# Patient Record
Sex: FEMALE | Race: WHITE | NOT HISPANIC OR LATINO | Employment: UNEMPLOYED | URBAN - METROPOLITAN AREA
[De-identification: names, ages, dates, MRNs, and addresses within clinical notes are randomized per-mention and may not be internally consistent; named-entity substitution may affect disease eponyms.]

---

## 2017-01-09 RX ORDER — LISINOPRIL 40 MG/1
TABLET ORAL
Qty: 90 TABLET | Refills: 0 | Status: SHIPPED | OUTPATIENT
Start: 2017-01-09 | End: 2017-04-06 | Stop reason: SDUPTHER

## 2017-01-17 ENCOUNTER — OFFICE VISIT (OUTPATIENT)
Dept: INTERNAL MEDICINE | Facility: CLINIC | Age: 57
End: 2017-01-17
Payer: COMMERCIAL

## 2017-01-17 ENCOUNTER — HOSPITAL ENCOUNTER (OUTPATIENT)
Dept: RADIOLOGY | Facility: HOSPITAL | Age: 57
Discharge: HOME | End: 2017-01-17
Attending: INTERNAL MEDICINE
Payer: COMMERCIAL

## 2017-01-17 VITALS
BODY MASS INDEX: 39.37 KG/M2 | HEIGHT: 64 IN | TEMPERATURE: 98 F | HEART RATE: 84 BPM | DIASTOLIC BLOOD PRESSURE: 80 MMHG | WEIGHT: 230.63 LBS | SYSTOLIC BLOOD PRESSURE: 130 MMHG

## 2017-01-17 DIAGNOSIS — M79.644 THUMB PAIN, RIGHT: ICD-10-CM

## 2017-01-17 DIAGNOSIS — I10 HTN (HYPERTENSION), BENIGN: ICD-10-CM

## 2017-01-17 DIAGNOSIS — E78.5 DYSLIPIDEMIA: ICD-10-CM

## 2017-01-17 DIAGNOSIS — E11.9 DIABETES MELLITUS WITHOUT COMPLICATION: Primary | ICD-10-CM

## 2017-01-17 PROCEDURE — 4010F ACE/ARB THERAPY RXD/TAKEN: CPT | Mod: S$GLB,,, | Performed by: INTERNAL MEDICINE

## 2017-01-17 PROCEDURE — 3075F SYST BP GE 130 - 139MM HG: CPT | Mod: S$GLB,,, | Performed by: INTERNAL MEDICINE

## 2017-01-17 PROCEDURE — 73140 X-RAY EXAM OF FINGER(S): CPT | Mod: TC,PO

## 2017-01-17 PROCEDURE — 73140 X-RAY EXAM OF FINGER(S): CPT | Mod: 26,,, | Performed by: RADIOLOGY

## 2017-01-17 PROCEDURE — 99999 PR PBB SHADOW E&M-EST. PATIENT-LVL III: CPT | Mod: PBBFAC,,, | Performed by: INTERNAL MEDICINE

## 2017-01-17 PROCEDURE — 99214 OFFICE O/P EST MOD 30 MIN: CPT | Mod: S$GLB,,, | Performed by: INTERNAL MEDICINE

## 2017-01-17 PROCEDURE — 1159F MED LIST DOCD IN RCRD: CPT | Mod: S$GLB,,, | Performed by: INTERNAL MEDICINE

## 2017-01-17 PROCEDURE — 2022F DILAT RTA XM EVC RTNOPTHY: CPT | Mod: S$GLB,,, | Performed by: INTERNAL MEDICINE

## 2017-01-17 PROCEDURE — 3045F PR MOST RECENT HEMOGLOBIN A1C LEVEL 7.0-9.0%: CPT | Mod: S$GLB,,, | Performed by: INTERNAL MEDICINE

## 2017-01-17 PROCEDURE — 3079F DIAST BP 80-89 MM HG: CPT | Mod: S$GLB,,, | Performed by: INTERNAL MEDICINE

## 2017-01-17 NOTE — PROGRESS NOTES
This office note has been dictated.  HISTORY OF PRESENT ILLNESS:  This is a 56-year-old lady following up on several   chronic medical conditions including diabetes mellitus, hypertension,   dyslipidemia and obesity.  The patient reports she is taking all of her   medication as directed.  Lifestyle has not been optimal in recent months with   regards to diet and activity levels.  She is not currently having any chest   pain, palpitations, syncope, cough or shortness of breath.    CURRENT MEDICATIONS:  Noted and reviewed in our electronic medical record   medication list.    REVIEW OF SYSTEMS:  CONSTITUTIONAL:  No fever.  No chills.  CARDIOVASCULAR:  No chest pain, palpitations or syncope.  No exertional limb   pain.  RESPIRATORY:  No cough or shortness of breath.  MUSCULOSKELETAL:  She gives 1-month history of some pain over the right thumb at   the metacarpophalangeal joint, palmar aspect.  No history of trauma.    PAST MEDICAL HISTORY, PAST SURGICAL HISTORY, FAMILY AND SOCIAL HISTORY:  Noted   and reviewed in the electronic medical record history sections.    PHYSICAL EXAMINATION:  GENERAL:  Alert, pleasant, appropriately groomed lady, in no acute distress.  VITAL SIGNS:  Blood pressure taken manually by this examiner is 130/80.  EYES:  Sclerae are white and nonicteric.  HEENT:  Normocephalic.  NECK:  Supple.  No masses.  No thyromegaly.  LUNGS:  Clear to auscultation.  CARDIOVASCULAR:  Regular rate and rhythm.  No murmur, click, rub or gallop.    Carotids are full bilaterally without bruits.  No significant peripheral   extremity edema.  ORTHOPEDIC:  Right Hand:  Thumb, there is tenderness to palpation over the   palmar aspect of the right metacarpophalangeal joint, first digit.  No swelling.    No fluctuance.    LABORATORY DATA:  We reviewed recent laboratory data from 12/27/2016.    Glycohemoglobin is 7.8.  TSH is minimally elevated.  Chemistries and lipids   noted.    IMPRESSION:  1.  Obesity with  comorbidities of hypertension, diabetes and dyslipidemia.  2.  Type 2 diabetes mellitus, is not optimally controlled.  3.  Hypertension, controlled.  4.  Dyslipidemia, on statin therapy.  5.  Pain over the first thumb digit joint as noted above.    PLAN:  1.  I discussed need for optimization of lifestyle, increased activity, improved   diet and weight loss, particularly in view of her BMI of 39.58.  After much   discussion, she is agreeable to start Januvia 100 mg daily.  She is given a free   30-month trial as well as discount cards to use.  2.  In 3 months, check complete metabolic profile, TSH, free T4 and   glycohemoglobin.  3.  I advised pneumococcal-23 Valent vaccine, which she declines.  I also   advised an influenza vaccine and she declines.  4.  Return to clinic in 6 months.      JON  dd: 01/17/2017 18:07:45 (CST)  td: 01/18/2017 05:19:09 (CST)  Doc ID   #[id number]  Job ID #[id number]    CC:

## 2017-01-19 RX ORDER — BLOOD-GLUCOSE METER
EACH MISCELLANEOUS
Qty: 100 STRIP | Refills: 11 | Status: SHIPPED | OUTPATIENT
Start: 2017-01-19 | End: 2018-09-20 | Stop reason: SDUPTHER

## 2017-01-23 ENCOUNTER — TELEPHONE (OUTPATIENT)
Dept: INTERNAL MEDICINE | Facility: CLINIC | Age: 57
End: 2017-01-23

## 2017-01-23 RX ORDER — ALPRAZOLAM 0.5 MG/1
TABLET ORAL
Qty: 100 TABLET | Refills: 0 | Status: SHIPPED | OUTPATIENT
Start: 2017-01-23 | End: 2017-02-23 | Stop reason: SDUPTHER

## 2017-01-23 NOTE — TELEPHONE ENCOUNTER
----- Message from Carmina Arredondo sent at 1/23/2017  1:14 PM CST -----  Contact: Call Pt [phone]  Pt called requesting to speak to you concerning denial of medication Alprazolam

## 2017-01-24 ENCOUNTER — OFFICE VISIT (OUTPATIENT)
Dept: ORTHOPEDICS | Facility: CLINIC | Age: 57
End: 2017-01-24
Payer: COMMERCIAL

## 2017-01-24 VITALS
HEART RATE: 100 BPM | WEIGHT: 230 LBS | DIASTOLIC BLOOD PRESSURE: 85 MMHG | HEIGHT: 64 IN | SYSTOLIC BLOOD PRESSURE: 139 MMHG | BODY MASS INDEX: 39.27 KG/M2

## 2017-01-24 DIAGNOSIS — M79.644 THUMB PAIN, RIGHT: ICD-10-CM

## 2017-01-24 DIAGNOSIS — M65.311 TRIGGER FINGER OF RIGHT THUMB: Primary | ICD-10-CM

## 2017-01-24 PROCEDURE — 3079F DIAST BP 80-89 MM HG: CPT | Mod: S$GLB,,, | Performed by: ORTHOPAEDIC SURGERY

## 2017-01-24 PROCEDURE — 20550 NJX 1 TENDON SHEATH/LIGAMENT: CPT | Mod: F5,S$GLB,, | Performed by: ORTHOPAEDIC SURGERY

## 2017-01-24 PROCEDURE — 1159F MED LIST DOCD IN RCRD: CPT | Mod: S$GLB,,, | Performed by: ORTHOPAEDIC SURGERY

## 2017-01-24 PROCEDURE — 99204 OFFICE O/P NEW MOD 45 MIN: CPT | Mod: 25,S$GLB,, | Performed by: ORTHOPAEDIC SURGERY

## 2017-01-24 PROCEDURE — 99999 PR PBB SHADOW E&M-EST. PATIENT-LVL III: CPT | Mod: PBBFAC,,, | Performed by: ORTHOPAEDIC SURGERY

## 2017-01-24 PROCEDURE — 3075F SYST BP GE 130 - 139MM HG: CPT | Mod: S$GLB,,, | Performed by: ORTHOPAEDIC SURGERY

## 2017-01-24 RX ORDER — MELOXICAM 15 MG/1
15 TABLET ORAL DAILY
Qty: 30 TABLET | Refills: 0 | Status: SHIPPED | OUTPATIENT
Start: 2017-01-24 | End: 2017-02-20 | Stop reason: SDUPTHER

## 2017-01-24 RX ORDER — TRIAMCINOLONE ACETONIDE 40 MG/ML
40 INJECTION, SUSPENSION INTRA-ARTICULAR; INTRAMUSCULAR
Status: DISCONTINUED | OUTPATIENT
Start: 2017-01-24 | End: 2017-01-24 | Stop reason: HOSPADM

## 2017-01-24 RX ADMIN — TRIAMCINOLONE ACETONIDE 40 MG: 40 INJECTION, SUSPENSION INTRA-ARTICULAR; INTRAMUSCULAR at 05:01

## 2017-01-24 NOTE — PROGRESS NOTES
Subjective:          Chief Complaint: Carmen Hernandez is a 56 y.o. female who had concerns including Pain of the Right Hand.    HPI Comments: Mrs. Hernandez is here for evaluation of her right thumb that has been triggering for the past 2 weeks. She has not had this happen in the past.    Pain: 0-10/10      Review of Systems   Constitution: Negative for chills and fever.   Endocrine:        Diabetes   Hematologic/Lymphatic:        Anemia   Musculoskeletal: Positive for joint pain.   Psychiatric/Behavioral: The patient is nervous/anxious.    All other systems reviewed and are negative.                  Objective:        General: Carmen is well-developed, well-nourished, appears stated age, in no acute distress, alert and oriented to time, place and person.     General    Vitals reviewed.  Constitutional: She is oriented to person, place, and time. She appears well-developed and well-nourished. No distress.   HENT:   Head: Normocephalic and atraumatic.   Nose: Nose normal.   Eyes: Pupils are equal, round, and reactive to light.   Pulmonary/Chest: Effort normal.   Neurological: She is alert and oriented to person, place, and time.   Psychiatric: She has a normal mood and affect. Her behavior is normal. Judgment and thought content normal.             Right Hand/Wrist Exam     Inspection   Scars: Wrist - absent Hand -  absent  Effusion: Wrist - absent Hand -  absent  Bruising: Wrist - absent Hand -  absent  Deformity: Wrist - deformity Hand -  deformity    Pain   Hand - The patient exhibits pain of the thumb MCP.    Swelling   Hand - The patient is swollen on the thumb MCP.    Tenderness   The patient is tender to palpation of the patel area.    Range of Motion     Wrist   Extension: normal   Flexion: normal   Pronation: normal   Supination: normal     Tests     Atrophy   Thenar:  negative  Hypothenar:  negative  Intrinsic:  negative  1st Dorsal Interosseous: negative    Other     Neuorologic Exam    Median  Distribution: normal  Ulnar Distribution: normal  Radial Distribution: normal    Comments:  Palpable click and active triggering on exam today of right thumb      Left Hand/Wrist Exam   Left hand exam is normal.          Muscle Strength   Right Upper Extremity   Wrist Extension: 5/5/5   Wrist Flexion: 5/5/5   : 5/5/5   Index Finger: 5/5  Middle Finger: 5/5  Ring Finger: 5/5  Little Finger: 5/5  Thumb - APB: 5/5  Thumb - FPL: 5/5  Pinch Mechanism: 5/5    Vascular Exam       Capillary Refill  Right Hand: normal capillary refill    Current and previous radiographic studies and results were reviewed with the patient:   3 views right.  Bone, joint and soft tissues normal        Assessment:       Encounter Diagnoses   Name Primary?    Thumb pain, right     Trigger finger of right thumb Yes          Plan:           Right thumb injection  Meloxicam 15mg pO QD  F/U PRN

## 2017-01-24 NOTE — PATIENT INSTRUCTIONS
What is Trigger Finger?  Trigger finger is an inflammation of tissue inside your finger or thumb. It is also called tenosynovitis (ten-oh-sin-oh-VY-tis). Tendons (cordlike fibers that attach muscle to bone and allow you to bend the joints) become swollen. So does the synovium (a slick membrane that allows the tendons to move easily). This makes it difficult to straighten the finger or thumb.    Causes  Repeated use of a tool with strong gripping, such as a drill or wrench, can irritate and inflame the tendons and the synovium. It is also more common in certain medical conditions such as rheumatoid arthritis, gout, and diabetes. But often the cause of trigger finger is unknown.  Inside your finger  Tendons connect muscles in your forearm to the bones in your fingers. The tendons in each finger are surrounded by a protective tendon sheath. This sheath is lined with synovium, which produces a fluid that allows the tendons to slide easily when you bend and straighten the finger. If a tendon is irritated, it becomes inflamed.  When a tendon is inflamed  When a tendon is inflamed, it causes the lining of the tendon sheath to swell and thicken. Or the tendon itself may thicken. Then the sheath pinches the tendon, and the tendon can no longer slide easily inside the sheath. When you straighten your finger, the tendon sticks or locks as it tries to squeeze back through the sheath.    Symptoms  The first sign of trigger finger may be pain where the finger or thumb joins the palm. You may also notice some swelling. As the tendon becomes more inflamed, the finger may start to catch when you try to straighten it. When the locked tendon releases, the finger jumps, as if you were releasing the trigger of a gun. This further irritates the tendon, and may set up a cycle of catching and swelling.   © 9747-8884 [employer] [address] [place] [address], [place] [address]. All rights reserved. This information is not  intended as a substitute for professional medical care. Always follow your healthcare professional's instructions.        Treating Trigger Finger     The tendon sheath is opened to release the tendon. Once the tendon can move freely again, the finger can bend and straighten more normally.     Trigger finger occurs when the tissue inside your finger or thumb becomes inflamed. Mild cases can be treated without surgery. If the problem is severe, surgery may be needed. Your doctor will discuss your options with you.  Nonsurgical treatment  For mild symptoms, your doctor may have you rest the finger or thumb. You may also be told to take anti-inflammatory medicines. These include ibuprofen or aspirin. You may be given an injection of medicine in the base of the finger or thumb. This typically is a steroid, such as cortisone.  Surgery  If nonsurgical treatments dont ease your symptoms, surgery may be recommended. A tendon is a cordlike fiber that attaches muscle to bone and allows joints to bend. The tendon is surrounded by a protective cover called a sheath. During surgery, the sheath in your finger or thumb is opened to enlarge the space and release the swollen tendon. This allows the finger or thumb to bend and straighten normally. Surgery takes about 20 minutes. It can often be done using a local anesthetic. You may be able to go home the same day. Your hand will be wrapped in a soft bandage. You may need to wear a plaster splint for a short time to keep the finger or thumb still as it heals. The stitches will be removed in about 2 weeks. Your doctor can discuss the risks and benefits of surgery with you.  © 3342-3177 [employer]. [address] [place] [address], [place] [address]. All rights reserved. This information is not intended as a substitute for professional medical care. Always follow your healthcare professional's instructions.

## 2017-01-24 NOTE — PROCEDURES
Tendon Sheath  Date/Time: 1/24/2017 5:01 PM  Performed by: CLARITA LAKE  Authorized by: CLARITA LAKE     Consent Done?: Yes (Verbal)  Timeout: prior to procedure the correct patient, procedure, and site was verified   Indications:  Pain  Site marked: the procedure site was marked    Timeout: prior to procedure the correct patient, procedure, and site was verified    Location:  Thumb  Site:  R thumb MCP  Prep: patient was prepped and draped in usual sterile fashion    Ultrasonic guidance for needle placement?: No    Needle size:  25 G  Approach:  Volar  Medications:  40 mg triamcinolone acetonide 40 mg/mL  Patient tolerance:  Patient tolerated the procedure well with no immediate complications

## 2017-01-24 NOTE — LETTER
January 24, 2017      SHAGUFTA Bentley MD  [address] [facility] [place] [address]           [facility] [address] [facility] [address] [place] [address]  Phone: [phone]          Patient: Carmen Hernandez   MR Number: [id number]   YOB: 1960   Date of Visit: 1/24/2017       Dear Dr. SHAGUFTA Bentley:    Thank you for referring Carmen Hernandez to me for evaluation. Attached you will find relevant portions of my assessment and plan of care.    If you have questions, please do not hesitate to call me. I look forward to following Carmen Hernandez along with you.    Sincerely,    Tyrell Prather MD    Enclosure  CC:  No Recipients    If you would like to receive this communication electronically, please contact [email or web address] or [phone] to request more information on [employer] Link access.    For providers and/or their staff who would like to refer a patient to [facility], please contact us through our one-stop-shop provider referral line, [facility], at [phone].    If you feel you have received this communication in error or would no longer like to receive these types of communications, please e-mail [email or web address]

## 2017-02-20 DIAGNOSIS — M65.311 TRIGGER FINGER OF RIGHT THUMB: ICD-10-CM

## 2017-02-20 DIAGNOSIS — M79.644 THUMB PAIN, RIGHT: ICD-10-CM

## 2017-02-21 RX ORDER — MELOXICAM 15 MG/1
TABLET ORAL
Qty: 30 TABLET | Refills: 0 | Status: SHIPPED | OUTPATIENT
Start: 2017-02-21 | End: 2017-03-21 | Stop reason: SDUPTHER

## 2017-02-23 RX ORDER — ALPRAZOLAM 0.5 MG/1
TABLET ORAL
Qty: 100 TABLET | Refills: 0 | Status: SHIPPED | OUTPATIENT
Start: 2017-02-23 | End: 2017-03-29 | Stop reason: SDUPTHER

## 2017-02-27 RX ORDER — METFORMIN HYDROCHLORIDE 500 MG/1
TABLET ORAL
Qty: 360 TABLET | Refills: 0 | Status: SHIPPED | OUTPATIENT
Start: 2017-02-27 | End: 2017-04-24 | Stop reason: SDUPTHER

## 2017-03-21 ENCOUNTER — HOSPITAL ENCOUNTER (OUTPATIENT)
Dept: RADIOLOGY | Facility: HOSPITAL | Age: 57
Discharge: HOME | End: 2017-03-21
Attending: ORTHOPAEDIC SURGERY
Payer: COMMERCIAL

## 2017-03-21 ENCOUNTER — OFFICE VISIT (OUTPATIENT)
Dept: ORTHOPEDICS | Facility: CLINIC | Age: 57
End: 2017-03-21
Payer: COMMERCIAL

## 2017-03-21 VITALS
DIASTOLIC BLOOD PRESSURE: 81 MMHG | BODY MASS INDEX: 39.27 KG/M2 | HEART RATE: 101 BPM | WEIGHT: 230 LBS | SYSTOLIC BLOOD PRESSURE: 155 MMHG | HEIGHT: 64 IN

## 2017-03-21 DIAGNOSIS — M25.569 KNEE PAIN, UNSPECIFIED CHRONICITY, UNSPECIFIED LATERALITY: ICD-10-CM

## 2017-03-21 DIAGNOSIS — M25.561 RIGHT KNEE PAIN, UNSPECIFIED CHRONICITY: ICD-10-CM

## 2017-03-21 DIAGNOSIS — M17.0 PRIMARY OSTEOARTHRITIS OF BOTH KNEES: Primary | ICD-10-CM

## 2017-03-21 DIAGNOSIS — M25.569 KNEE PAIN, UNSPECIFIED CHRONICITY, UNSPECIFIED LATERALITY: Primary | ICD-10-CM

## 2017-03-21 DIAGNOSIS — M79.644 THUMB PAIN, RIGHT: ICD-10-CM

## 2017-03-21 DIAGNOSIS — M65.311 TRIGGER FINGER OF RIGHT THUMB: ICD-10-CM

## 2017-03-21 DIAGNOSIS — M25.649 THUMB JOINT STIFFNESS: ICD-10-CM

## 2017-03-21 PROCEDURE — 99213 OFFICE O/P EST LOW 20 MIN: CPT | Mod: S$GLB,,, | Performed by: ORTHOPAEDIC SURGERY

## 2017-03-21 PROCEDURE — 99999 PR PBB SHADOW E&M-EST. PATIENT-LVL III: CPT | Mod: PBBFAC,,, | Performed by: ORTHOPAEDIC SURGERY

## 2017-03-21 PROCEDURE — 1160F RVW MEDS BY RX/DR IN RCRD: CPT | Mod: S$GLB,,, | Performed by: ORTHOPAEDIC SURGERY

## 2017-03-21 PROCEDURE — 3077F SYST BP >= 140 MM HG: CPT | Mod: S$GLB,,, | Performed by: ORTHOPAEDIC SURGERY

## 2017-03-21 PROCEDURE — 73564 X-RAY EXAM KNEE 4 OR MORE: CPT | Mod: 26,RT,, | Performed by: RADIOLOGY

## 2017-03-21 PROCEDURE — 3079F DIAST BP 80-89 MM HG: CPT | Mod: S$GLB,,, | Performed by: ORTHOPAEDIC SURGERY

## 2017-03-21 PROCEDURE — 73564 X-RAY EXAM KNEE 4 OR MORE: CPT | Mod: 26,59,LT, | Performed by: RADIOLOGY

## 2017-03-21 RX ORDER — MELOXICAM 15 MG/1
15 TABLET ORAL DAILY
Qty: 30 TABLET | Refills: 3 | Status: SHIPPED | OUTPATIENT
Start: 2017-03-21 | End: 2017-09-22

## 2017-03-21 NOTE — MR AVS SNAPSHOT
[facility]  [address] [facility] [address]  [facility] [address]  Phone: [phone]                  Carmen Hernandez   3/21/2017 3:30 PM   Office Visit    Description:  Female : 1960   Provider:  Tyrell Prather MD   Department:  [place]           Reason for Visit     Knee Pain     Hand Pain           Diagnoses this Visit        Comments    Primary osteoarthritis of both knees    -  Primary     Trigger finger of right thumb         Thumb pain, right         Right knee pain, unspecified chronicity         Thumb joint stiffness                To Do List           Future Appointments        Provider Department Dept Phone    2017 3:15 PM Tyrell Prather MD [facility] [phone]      Goals (5 Years of Data)     None       These Medications        Disp Refills Start End    meloxicam (MOBIC) 15 MG tablet 30 tablet 3 3/21/2017     Take 1 tablet (15 mg total) by mouth once daily. - Oral    [facility] [address] [place] [address] [address] [facility] [address] Ph #: [phone]         [facility]     [facility] On [facility] Nurse Care Line -  Assistance  Registered nurses in the [facility] Center provide clinical advisement, health education, appointment booking, and other advisory services.  Call for this free service at [phone].             Medications           Message regarding Medications     Verify the changes and/or additions to your medication regime listed below are the same as discussed with your clinician today.  If any of these changes or additions are incorrect, please notify your healthcare provider.        CHANGE how you are taking these medications     Start Taking Instead of    meloxicam (MOBIC) 15 MG tablet meloxicam (MOBIC) 15 MG tablet    Dosage:  Take 1 tablet (15 mg total) by mouth once daily. Dosage:  TAKE 1 TABLET(15 MG) BY MOUTH EVERY DAY    Reason for Change:  Reorder           "  Verify that the below list of medications is an accurate representation of the medications you are currently taking.  If none reported, the list may be blank. If incorrect, please contact your healthcare provider. Carry this list with you in case of emergency.           Current Medications     alcohol swabs (BD ALCOHOL SWABS) PadM USE TO CHECK BLOOD GLUCOSE TWICE DAILY    alprazolam (XANAX) 0.5 MG tablet TAKE 1 TABLET BY MOUTH FOUR TIMES DAILY AS NEEDED FOR ANXIETY    lisinopril (PRINIVIL,ZESTRIL) 40 MG tablet TAKE 1 TABLET BY MOUTH EVERY DAY    meloxicam (MOBIC) 15 MG tablet Take 1 tablet (15 mg total) by mouth once daily.    metformin (GLUCOPHAGE) 500 MG tablet TAKE 2 TABLETS BY MOUTH TWICE DAILY    multivitamin capsule Take 1 capsule by mouth once daily.    ONETOUCH DELICA LANCETS 33 gauge Misc USE TO TEST BLOOD GLUCOSE TWICE DAILY    ONETOUCH VERIO Strp USE TO TEST AS DIRECTED    ONETOUCH VERIO SYNC kit USE TO TEST BLOOD GLUOCSE TWICE DAILY    pravastatin (PRAVACHOL) 40 MG tablet TAKE 1 TABLET BY MOUTH EVERY DAY    SITagliptan (JANUVIA) 100 MG Tab Take 1 tablet (100 mg total) by mouth once daily.           Clinical Reference Information           Your Vitals Were     BP Pulse Height Weight BMI    155/81 101 5' 4" (1.626 m) 104.3 kg (230 lb) 39.48 kg/m2      Blood Pressure          Most Recent Value    BP  (!)  155/81      Allergies as of 3/21/2017     Demerol [Meperidine]    Iodinated Contrast Media - Iv Dye    Bactrim [Sulfamethoxazole-trimethoprim]    Ciprofloxacin    Erythromycin      Immunizations Administered on Date of Encounter - 3/21/2017     None      Orders Placed During Today's Visit      Normal Orders This Visit    Ambulatory consult to Occupational Therapy       Language Assistance Services     ATTENTION: Language assistance services are available, free of charge. Please call [phone].      ATENCIÓN: Si habla terrance, tiene a luna disposición servicios gratuitos de asistencia lingüística. " Valentina godfrey [phone].     SUSANNE Ý: N?u b?n nói Ti?ng Vi?t, có các d?ch v? h? tr? ngôn ng? mi?n phí dành cho b?n. G?i s? [phone].         [facility] complies with applicable Federal civil rights laws and does not discriminate on the basis of race, color, national origin, age, disability, or sex.

## 2017-03-23 NOTE — PROGRESS NOTES
CC:right KNEE pain; right thumb triggering/stiffness/pain    HISTORY OF PRESENT ILLNESS:  Carmen Hernandez is a 57 y.o. Female who reports right knee pain refractory to conservative mgmt. The right thumb is not triggering now but it is stiff.    History isnegative fortrauma.    Today the patient rates pain at a 8/10 on visual analog scale in right knee.    The patient has tried previous right thumb injection to make the pain better with little relief of the pain.    She reports that the pain is worse with walking.  It does not wake the patient at night.    negative for mechanical symptoms, negative forinstability    It does affect the performance of ADLs. It does affect the ability to perform exercises or recreational activities which include: walking.    Past Medical History:   Diagnosis Date    Anxiety     Bilateral dry eyes     uses atears//    Cataract     Diabetes mellitus     LBSL 128 today---fluctuates    Hyperlipidemia     Hypertension     Nephrolithiasis        Past Surgical History:   Procedure Laterality Date    COLONOSCOPY  5/13/2009  [place]    One 4 mm polyp in the transverse colon.  HYPERPLASTIC POLYP.    Diverticulosis sigmoid colon.    Tortuous colon.   Granularity hepatic flexure.     CYSTOSCOPY W/ STONE MANIPULATION      ENDOMETRIAL BIOPSY  X2    OOPHORECTOMY         Family History   Problem Relation Age of Onset    Cataracts Mother     Heart disease Mother     Hypertension Mother     Diabetes Father     Hypertension Father     Diabetes Brother     Hypertension Brother     Macular degeneration Neg Hx     Retinal detachment Neg Hx     Strabismus Neg Hx     Stroke Neg Hx     Thyroid disease Neg Hx     Glaucoma Neg Hx     Cancer Neg Hx     Amblyopia Neg Hx     Blindness Neg Hx     Breast cancer Neg Hx          Current Outpatient Prescriptions:     alcohol swabs (BD ALCOHOL SWABS) PadM, USE TO CHECK BLOOD GLUCOSE TWICE DAILY, Disp: 200 each, Rfl: 0    alprazolam  (XANAX) 0.5 MG tablet, TAKE 1 TABLET BY MOUTH FOUR TIMES DAILY AS NEEDED FOR ANXIETY, Disp: 100 tablet, Rfl: 0    lisinopril (PRINIVIL,ZESTRIL) 40 MG tablet, TAKE 1 TABLET BY MOUTH EVERY DAY, Disp: 90 tablet, Rfl: 0    meloxicam (MOBIC) 15 MG tablet, Take 1 tablet (15 mg total) by mouth once daily., Disp: 30 tablet, Rfl: 3    metformin (GLUCOPHAGE) 500 MG tablet, TAKE 2 TABLETS BY MOUTH TWICE DAILY, Disp: 360 tablet, Rfl: 0    multivitamin capsule, Take 1 capsule by mouth once daily., Disp: , Rfl:     ONETOUCH DELICA LANCETS 33 gauge Misc, USE TO TEST BLOOD GLUCOSE TWICE DAILY, Disp: 200 each, Rfl: 0    ONETOUCH VERIO Strp, USE TO TEST AS DIRECTED, Disp: 100 strip, Rfl: 11    ONETOUCH VERIO SYNC kit, USE TO TEST BLOOD GLUOCSE TWICE DAILY, Disp: 1 each, Rfl: 0    pravastatin (PRAVACHOL) 40 MG tablet, TAKE 1 TABLET BY MOUTH EVERY DAY, Disp: 90 tablet, Rfl: 3    SITagliptan (JANUVIA) 100 MG Tab, Take 1 tablet (100 mg total) by mouth once daily., Disp: 30 tablet, Rfl: 11    Review of patient's allergies indicates:   Allergen Reactions    Demerol [meperidine] Hives    Iodinated contrast media - iv dye Hives    Bactrim [sulfamethoxazole-trimethoprim] Other (See Comments)     Other reaction(s): blotchy skin    Ciprofloxacin Other (See Comments)     Other reaction(s): blotchy skin    Erythromycin Other (See Comments)     tachycardia       ROS    OBJECTIVE:  Vitals:    03/21/17 1515   BP: (!) 155/81   Pulse: 101       PHYSICAL EXAMINATION    General:  The patient is alert and oriented x 3.  Mood is pleasant.  Observation of ears, eyes and nose reveal no gross abnormalities.  No labored breathing observed.    Bilateral KNEE EXAMINATION     OBSERVATION / INSPECTION   Gait:   Nonantalgic   Alignment:  Neutral   Scars:   None   Muscle atrophy: Mild  Effusion:  None   Warmth:  None   Discoloration:   none     TENDERNESS / CREPITUS (T / C):  Right knee is painful side        T / C      T / C   Patella   ++ / -    Lateral joint line   - / -    Peripatellar medial  ++  Medial joint line    + / -    Peripatellar lateral ++  Medial plica   - / -    Patellar tendon -   Popliteal fossa   - / -    Quad tendon   -   Gastrocnemius   -   Prepatellar Bursa - / -   Quadricep   -   Tibial tubercle  -  Thigh/hamstring  -   Pes anserine/HS -  Fibula    -   ITB   - / -  Tibia     -   Tib/fib joint  - / -  LCL    -     MFC   - / -   MCL: Proximal  -    LFC   - / -    Distal   -          ROM: (* = pain)  PASSIVE   ACTIVE    Left :   5 / 0 / 145   5 / 0 / 145     Right :    5 / 0 / 145   5 / 0 / 145 **    Patellofemoral examination:  See above noted areas of tenderness.   Patella position    Subluxation / dislocation: Centered           Sup. / Inf;   Normal   Crepitus (PF):    ++  Patellar Mobility:       Medial-lateral:   Normal    Superior-inferior:  Normal    Inferior tilt   Normal    Patellar tendon:  Normal   Lateral tilt:    Normal   J-sign:     None   Patellofemoral grind:   No pain       MENISCAL SIGNS:     Pain on terminal extension:  -  Pain on terminal flexion:  +  Cristianos maneuver:  - for pain    LIGAMENT EXAMINATION:  ACL / Lachman:  normal (-1 to 2mm)    PCL-Post.  drawer: normal 0 to 2mm  MCL- Valgus:  normal 0 to 2mm  LCL- Varus:    normal 0 to 2mm    STRENGTH: (* = with pain) PAINFUL SIDE   Quadricep   5/5   Hamstrin/5    EXTREMITY NEURO-VASCULAR EXAMINATION:   Sensation:  Grossly intact to light touch all dermatomal regions.   Motor Function:  Fully intact motor function at hip, knee, foot and ankle    Vascular status:  DP and PT pulses 2+, brisk capillary refill, symmetric.     Xrays: ordered and reviewed personally by me (standing AP/flexion, lateral, sunrise) show:  AP and PA flexion standing views of both knees as well as a lateral and Merchant views of the both knees were obtained. There is minimal left-sided medial joint space narrowing and a slight lateral patellar tilt bilaterally. I do not see evidence  of acute fracture or subluxation of either knee.     ASSESSMENT:    Right Knee Osteoarthritis  Right thumb stiffness with history of triggering    PLAN:   Meloxicam 15mg pO QD  OT for right thumb  F/U  All questions were answered, pt will contact us for questions or concerns in the interim.

## 2017-03-29 RX ORDER — ALPRAZOLAM 0.5 MG/1
TABLET ORAL
Qty: 100 TABLET | Refills: 0 | Status: SHIPPED | OUTPATIENT
Start: 2017-03-29 | End: 2017-04-28 | Stop reason: SDUPTHER

## 2017-03-29 NOTE — TELEPHONE ENCOUNTER
----- Message from Drake Mitchell sent at 3/29/2017 10:38 AM CDT -----  Contact: Self   Type: Rx    Name of medication(s): alprazolam (XANAX) 0.5 MG tablet    Is this a refill? New rx? Refill    Who prescribed medication? Dr Bentley    Pharmacy Name, Phone, & Location: [facility] in chart    Comments:Please advice      Thanks

## 2017-03-30 RX ORDER — PRAVASTATIN SODIUM 40 MG/1
TABLET ORAL
Qty: 90 TABLET | Refills: 3 | Status: SHIPPED | OUTPATIENT
Start: 2017-03-30 | End: 2018-03-23 | Stop reason: SDUPTHER

## 2017-04-04 ENCOUNTER — CLINICAL SUPPORT (OUTPATIENT)
Dept: REHABILITATION | Facility: HOSPITAL | Age: 57
End: 2017-04-04
Attending: ORTHOPAEDIC SURGERY
Payer: COMMERCIAL

## 2017-04-04 DIAGNOSIS — R68.89 DECREASED FUNCTIONAL ACTIVITY TOLERANCE: ICD-10-CM

## 2017-04-04 DIAGNOSIS — M79.644 PAIN OF RIGHT THUMB: ICD-10-CM

## 2017-04-04 DIAGNOSIS — M25.649 DECREASED RANGE OF MOTION OF THUMB: ICD-10-CM

## 2017-04-04 DIAGNOSIS — R29.898 DECREASED GRIP STRENGTH OF RIGHT HAND: ICD-10-CM

## 2017-04-04 PROCEDURE — 97165 OT EVAL LOW COMPLEX 30 MIN: CPT | Mod: PN

## 2017-04-04 PROCEDURE — 97110 THERAPEUTIC EXERCISES: CPT | Mod: PN

## 2017-04-04 NOTE — PROGRESS NOTES
Occupational Therapy Evaluation    Patient:  Carmen Hernandez  Date of Therapy Visit:  2017  Referring Physician:  Dr Tyrell Prather  Diagnosis: Right Thumb Trigger Finger  MRN : [id number]  Referral Orders:  Eval and treat     Start Time: 1200pm  End Time:  100pm  Total Time:  60    Certification period from 17 to  17  Visit # 1   Diagnosis: Right trigger thumb    HISTORY/OCCUPATIONAL PROFILE/ Medical and Therapy History:  Subjective:  Patient is a 57 year old right hand dominant female who presents for initial occupational therapy evaluation and today,2017. She states she was trying to pop open a jar with the side of her thumb and her thumb started hurting.  This pain progressively became worse and she started having stiffness.  She saw Dr Prather who refers her here for therapy.  Patient's chief complaint is pain and reports difficulty with moving her thumb.  She complains of not having strength and being unable to use it.    Date of onset:  2017  Location of injury:   Right thumb  Current History of Injury /Mechanism of Injury:  Gradual onset  Rehabilitation Expectation/Goals: Patient's goals for therapy are to be able to     - minimize: pain  - normalize: loss of function - increase ROM & strength    Pain:   During no work/At Rest:     7 out of 10   While working/ With Activity:  10 out of 10   Sleepin out of 10    Location of Pain:  [place] mp   Description of Pain:  Sharp and dull   Pain relived by: rest    Previous Medical Management/ Past Medical History/Physical Systems Review:   Patient denies any previous injury to this area.  Past Medical History:   Diagnosis Date    Anxiety     Bilateral dry eyes     uses atears//    Cataract     Diabetes mellitus     LBSL 128 today---fluctuates    Hyperlipidemia     Hypertension     Nephrolithiasis      Review of patient's allergies indicates:   Allergen Reactions    Demerol [meperidine] Hives    Iodinated contrast media - iv  dye Hives    Bactrim [sulfamethoxazole-trimethoprim] Other (See Comments)     Other reaction(s): blotchy skin    Ciprofloxacin Other (See Comments)     Other reaction(s): blotchy skin    Erythromycin Other (See Comments)     tachycardia       Occupation:    Working presently: no   Patient's work/Activities consist of: n/a  Workmen's Compensation:  no      FUNCTIONAL STATUS:   Previous functional status includes: Independent with all ADLs and ambulating without assistance   Current FunctionalStatus: independent with pain   A typical day includes:  takes care of her 90 year old mother daily   Exercise routine prior to onset : none   DME owned: none   Home/Living environment :   lives with  in one story house   Limitation of Functional Status:   ADLs (difficulty with the following tasks):    - Feeding: cutting with knife     - Meal Prep: difficulty stirring, cutting    - Bathing: i     - Dressing: donning bra, buttoning shirt/pants, pulling up pants     - Grooming:  Placing ponytail palma in hair      Self Care / ADL:     IADLs: (difficulty with the following tasks):    - managing finances: pain with writing    - driving/handling transportation:  i     - shopping: d on Left    - using phone: i     - computer: i    - managing medications:  Opening child proof tops     - housework & basic home maintenance: pain with doorknobs     Leisure:  casino    Environmental Concerns/ Fall Risk:  None   Barriers to Learning:  None   Cultural/Spiritual : None   Developmental/Education: None  Nutritional Deficit: None   Abuse/Neglect : None    Language: None   Hearing/Vision Deficit: None     Objective:  Sensation Test:  Sensation grossly intact to light touch all dermatomal regions    Edema/ Girth/Volume: Pre-Treatment Girth (cm):      4/4/17 4/4/17    Right Left   Thumb mp 7.0 6.7   Thumb ip 6.6 6.4     Observations:    Palpation:  Skin Condition/Scarring/ Integument Scars/ Characteristics:     Edema: minimal edema  localized at thumb mp level   Patient has no numbness & tingling; Temp, touch and pressure sense are intact in right    Range of Motion: AROM        RIGHT      LEFT       Date:   04/04/2017 04/04/2017        Thumb MP Ext/Flex 0/44 0/66        Thumb IP Ext/Flex 10/56 0/64        Thumb Radial AB 0/42 0/50        Thumb Palmar AB 0/45 0/55       Coordination: not impaired for FMC in right  in ADL/IADL's     Endurance: decreased for light ADL/IADL's w/ use of right     Treatment/ Patient and family/caregiver learning and education: :     OT eval performed today and instruction in written HEP including joint protection  Patient did not require any verbal/physical or tactile cues to perform exercises correctly.    THERAPEUTIC EXERCISES x 15 mins: to increase ROM, increase strength and increase functional use  -Thumb ROM ex e/f, Rab, Pab, Ad    Assessment:     Medical Necessity is demonstrated by the following impairments/problems:    History Examination Decision Making Complexity Score   Occupational Profile:  Performance Deficits:  Difficulty cutting food, stirring pot, donning bra, pulling up pants, using phone, opening doorknobs Problem focused assessment, 1-3 performance deficits noted    Medical and Therapy History:   Past Medical History:   Diagnosis Date    Anxiety     Bilateral dry eyes     uses atears//    Cataract     Diabetes mellitus     LBSL 128 today---fluctuates    Hyperlipidemia     Hypertension     Nephrolithiasis     Physical: Decreased ROM, increased pain, decreased functional use, decreased strength     Cognitive: Impaired cognitive skills: none Modifications/ need for assistance:  Modification/task , no assistance required      Psychosocial:  Impaired psychosocial coping strategies: none             Level of complexity is low based on PMHX, co morbidities , data from assessments and functional level of assistance required with task and clinical presentation directly impacting       Medical  necessity is demonstrated by the following IMPAIRMENTS/PROBLEMS:  Patient Lack of Knowledge/Awareness in Home Program  Increased Pain in Right  Decreased functional use/ unable to perform ADLs/iADLs  Increased Edema  Decreased ROM   Decreased  strength  Decreased pinch strength  Scar Adherent    Patient does/ does not have a desire to return to work and home ADLs/IADLs performing tasks listed above such as:    Pt presents to occupational therapy with an OT diagnosis of right trigger thumb.  She presents with the above mentioned problem areas.  She may benefit from a custom made thumb trigger splint to address the triggering as well as the decreased extension in her ip joint.  I will contact her MD regarding this.  We reviewed a detailed home program to address these areas and patient was able to independently demonstrate these while in therapy today.  The patient requires skilled occupational therapy to address the problems identified above and to achieve the individual patient goals as outlined in the problems and goals section of this evaluation.  Overall rehab potential is GOOD.  The patient and or family/caregiver was educated regarding the details of their diagnosis, prognosis, related pathology, plan of care and modality use.  The patient demonstrates a GOOD understanding of the risks, benefits, precautions/ contraindications and prognosis of their skilled occupational therapy rehabilitation program.  We reviewed therapy expectations and goals and it was understood clearly that they will be active participants in their rehabilitation.    Carmen demonstrated a good understanding of the education provided including HEP and modalities for pain management.     Patient prefers to attend therapy: 2 times a week with time preference of late afternoon    G CODE TOOL: FOTO    Category: self care/ carrying      Current Score  = 57% impaired  Goal at Discharge Score = 37% impaired    Score interpretation is as  follows:   TEST SCORE  Modifier  Impairment Limitation Restriction    0%  CH  0 % impaired, limited or restricted    1-19%  CI  @ least 1% but less than 20% impaired, limited or restricted    20-39%  CJ  @ least 20%<40% impaired, limited or restricted    40-59%  CK  @ least 40%<60% impaired, limited or restricted    60-79%  CL  @ least 60% <80% impaired, limited or restricted    80-99%  CM  @ least 80%<100% impaired limited or restricted    100%  CN  100% impaired, limited or restricted     GOALS:  Short Therm Goals: (2 weeks)    STG: Patient will be independent in home exercise and self care program    STG : Symptomatic Improvements: Decreasing Pain: to 2/10 for increased activity tolerance    STG: Patient to be IND with HEP and modalities for pain management   Long Term Goals: (8-10 weeks)   LTG: Decrease edema in right wrist to WNLs for increased ability to hold a glass of water   LTG: Decrease complaints of pain to 0 out of 10 to increase tolerance for ADLs    LTG: Increase independence in the following ADLs/iADLs: use utensils to use a knife to prepare meal   LTG: Increase ROM to right = left in order to turn a doorknob   LTG: Increase functional use of right to  push a grocery cart for 10 minutes without pain   LTG: Increase  strength of right to open a tight jar   LTG: Increase lateral pinch strength when appropriate    Pt's spiritual, cultural and educational needs considered and patient is agreeable to plan of care and goals as stated above.     There are no Anticipated Barriers for Occupational  therapy      Plan:   Patient to be treated by Occupational Therapy 2 times per week for 8 weeks  to achieve the established goals. Treatment to include modalities including but not limited to paraffin, fluidotherapy, moist heat pack, edema control techniques, A/PROM, Manual therapy/mobilizations, Therapeutic exercises/activities, ultrasound, scar maturation techniques, desensitization, strengthening, neural  mobilizations/nerve gliding, stretching as well as any other treatments deemed necessary based on the patient's needs or progress.         I certify the need for these services furnished under this plan of treatment and while under my care    ____________________________________                        ______________  Physician/Referring Practitioner                Date of Signature

## 2017-04-04 NOTE — PLAN OF CARE
Occupational Therapy Evaluation    Patient:  Carmen Hernandez  Date of Therapy Visit:  2017  Referring Physician:  Dr Tyrell Prather  Diagnosis: Right Thumb Trigger Finger  MRN : [id number]  Referral Orders:  Eval and treat     Start Time: 1200pm  End Time:  100pm  Total Time:  60    Certification period from 17 to  17  Visit # 1   Diagnosis: Right trigger thumb    HISTORY/OCCUPATIONAL PROFILE/ Medical and Therapy History:  Subjective:  Patient is a 57 year old right hand dominant female who presents for initial occupational therapy evaluation and today,2017. She states she was trying to pop open a jar with the side of her thumb and her thumb started hurting.  This pain progressively became worse and she started having stiffness.  She saw Dr Prather who refers her here for therapy.  Patient's chief complaint is pain and reports difficulty with moving her thumb.  She complains of not having strength and being unable to use it.    Date of onset:  2017  Location of injury:   Right thumb  Current History of Injury /Mechanism of Injury:  Gradual onset  Rehabilitation Expectation/Goals: Patient's goals for therapy are to be able to     - minimize: pain  - normalize: loss of function - increase ROM & strength    Pain:   During no work/At Rest:     7 out of 10   While working/ With Activity:  10 out of 10   Sleepin out of 10    Location of Pain:  [place] mp   Description of Pain:  Sharp and dull   Pain relived by: rest    Previous Medical Management/ Past Medical History/Physical Systems Review:   Patient denies any previous injury to this area.  Past Medical History:   Diagnosis Date    Anxiety     Bilateral dry eyes     uses atears//    Cataract     Diabetes mellitus     LBSL 128 today---fluctuates    Hyperlipidemia     Hypertension     Nephrolithiasis      Review of patient's allergies indicates:   Allergen Reactions    Demerol [meperidine] Hives    Iodinated contrast media - iv  dye Hives    Bactrim [sulfamethoxazole-trimethoprim] Other (See Comments)     Other reaction(s): blotchy skin    Ciprofloxacin Other (See Comments)     Other reaction(s): blotchy skin    Erythromycin Other (See Comments)     tachycardia       Occupation:    Working presently: no   Patient's work/Activities consist of: n/a  Workmen's Compensation:  no      FUNCTIONAL STATUS:   Previous functional status includes: Independent with all ADLs and ambulating without assistance   Current FunctionalStatus: independent with pain   A typical day includes:  takes care of her 90 year old mother daily   Exercise routine prior to onset : none   DME owned: none   Home/Living environment :   lives with  in one story house   Limitation of Functional Status:   ADLs (difficulty with the following tasks):    - Feeding: cutting with knife     - Meal Prep: difficulty stirring, cutting    - Bathing: i     - Dressing: donning bra, buttoning shirt/pants, pulling up pants     - Grooming:  Placing ponytail palma in hair      Self Care / ADL:     IADLs: (difficulty with the following tasks):    - managing finances: pain with writing    - driving/handling transportation:  i     - shopping: d on Left    - using phone: i     - computer: i    - managing medications:  Opening child proof tops     - housework & basic home maintenance: pain with doorknobs     Leisure:  casino    Environmental Concerns/ Fall Risk:  None   Barriers to Learning:  None   Cultural/Spiritual : None   Developmental/Education: None  Nutritional Deficit: None   Abuse/Neglect : None    Language: None   Hearing/Vision Deficit: None     Objective:  Sensation Test:  Sensation grossly intact to light touch all dermatomal regions    Edema/ Girth/Volume: Pre-Treatment Girth (cm):      4/4/17 4/4/17    Right Left   Thumb mp 7.0 6.7   Thumb ip 6.6 6.4     Observations:    Palpation:  Skin Condition/Scarring/ Integument Scars/ Characteristics:     Edema: minimal edema  localized at thumb mp level   Patient has no numbness & tingling; Temp, touch and pressure sense are intact in right    Range of Motion: AROM        RIGHT      LEFT       Date:   04/04/2017 04/04/2017        Thumb MP Ext/Flex 0/44 0/66        Thumb IP Ext/Flex 10/56 0/64        Thumb Radial AB 0/42 0/50        Thumb Palmar AB 0/45 0/55       Coordination: not impaired for FMC in right  in ADL/IADL's     Endurance: decreased for light ADL/IADL's w/ use of right     Treatment/ Patient and family/caregiver learning and education: :     OT eval performed today and instruction in written HEP including joint protection  Patient did not require any verbal/physical or tactile cues to perform exercises correctly.    THERAPEUTIC EXERCISES x 15 mins: to increase ROM, increase strength and increase functional use  -Thumb ROM ex e/f, Rab, Pab, Ad    Assessment:     Medical Necessity is demonstrated by the following impairments/problems:    History Examination Decision Making Complexity Score   Occupational Profile:  Performance Deficits:  Difficulty cutting food, stirring pot, donning bra, pulling up pants, using phone, opening doorknobs Problem focused assessment, 1-3 performance deficits noted    Medical and Therapy History:   Past Medical History:   Diagnosis Date    Anxiety     Bilateral dry eyes     uses atears//    Cataract     Diabetes mellitus     LBSL 128 today---fluctuates    Hyperlipidemia     Hypertension     Nephrolithiasis     Physical: Decreased ROM, increased pain, decreased functional use, decreased strength     Cognitive: Impaired cognitive skills: none Modifications/ need for assistance:  Modification/task , no assistance required      Psychosocial:  Impaired psychosocial coping strategies: none             Level of complexity is low based on PMHX, co morbidities , data from assessments and functional level of assistance required with task and clinical presentation directly impacting       Medical  necessity is demonstrated by the following IMPAIRMENTS/PROBLEMS:  Patient Lack of Knowledge/Awareness in Home Program  Increased Pain in Right  Decreased functional use/ unable to perform ADLs/iADLs  Increased Edema  Decreased ROM   Decreased  strength  Decreased pinch strength  Scar Adherent    Patient does/ does not have a desire to return to work and home ADLs/IADLs performing tasks listed above such as:    Pt presents to occupational therapy with an OT diagnosis of right trigger thumb.  She presents with the above mentioned problem areas.  She may benefit from a custom made thumb trigger splint to address the triggering as well as the decreased extension in her ip joint.  I will contact her MD regarding this.  We reviewed a detailed home program to address these areas and patient was able to independently demonstrate these while in therapy today.  The patient requires skilled occupational therapy to address the problems identified above and to achieve the individual patient goals as outlined in the problems and goals section of this evaluation.  Overall rehab potential is GOOD.  The patient and or family/caregiver was educated regarding the details of their diagnosis, prognosis, related pathology, plan of care and modality use.  The patient demonstrates a GOOD understanding of the risks, benefits, precautions/ contraindications and prognosis of their skilled occupational therapy rehabilitation program.  We reviewed therapy expectations and goals and it was understood clearly that they will be active participants in their rehabilitation.    Carmen demonstrated a good understanding of the education provided including HEP and modalities for pain management.     Patient prefers to attend therapy: 2 times a week with time preference of late afternoon    G CODE TOOL: FOTO    Category: self care/ carrying      Current Score  = 57% impaired  Goal at Discharge Score = 37% impaired    Score interpretation is as  follows:   TEST SCORE  Modifier  Impairment Limitation Restriction    0%  CH  0 % impaired, limited or restricted    1-19%  CI  @ least 1% but less than 20% impaired, limited or restricted    20-39%  CJ  @ least 20%<40% impaired, limited or restricted    40-59%  CK  @ least 40%<60% impaired, limited or restricted    60-79%  CL  @ least 60% <80% impaired, limited or restricted    80-99%  CM  @ least 80%<100% impaired limited or restricted    100%  CN  100% impaired, limited or restricted     GOALS:  Short Therm Goals: (2 weeks)    STG: Patient will be independent in home exercise and self care program    STG : Symptomatic Improvements: Decreasing Pain: to 2/10 for increased activity tolerance    STG: Patient to be IND with HEP and modalities for pain management   Long Term Goals: (8-10 weeks)   LTG: Decrease edema in right wrist to WNLs for increased ability to hold a glass of water   LTG: Decrease complaints of pain to 0 out of 10 to increase tolerance for ADLs    LTG: Increase independence in the following ADLs/iADLs: use utensils to use a knife to prepare meal   LTG: Increase ROM to right = left in order to turn a doorknob   LTG: Increase functional use of right to  push a grocery cart for 10 minutes without pain   LTG: Increase  strength of right to open a tight jar   LTG: Increase lateral pinch strength when appropriate    Pt's spiritual, cultural and educational needs considered and patient is agreeable to plan of care and goals as stated above.     There are no Anticipated Barriers for Occupational  therapy      Plan:   Patient to be treated by Occupational Therapy 2 times per week for 8 weeks  to achieve the established goals. Treatment to include modalities including but not limited to paraffin, fluidotherapy, moist heat pack, edema control techniques, A/PROM, Manual therapy/mobilizations, Therapeutic exercises/activities, ultrasound, scar maturation techniques, desensitization, strengthening, neural  mobilizations/nerve gliding, stretching as well as any other treatments deemed necessary based on the patient's needs or progress.         I certify the need for these services furnished under this plan of treatment and while under my care    ____________________________________                        ______________  Physician/Referring Practitioner                Date of Signature

## 2017-04-06 ENCOUNTER — CLINICAL SUPPORT (OUTPATIENT)
Dept: REHABILITATION | Facility: HOSPITAL | Age: 57
End: 2017-04-06
Attending: ORTHOPAEDIC SURGERY
Payer: COMMERCIAL

## 2017-04-06 DIAGNOSIS — R29.898 DECREASED GRIP STRENGTH OF RIGHT HAND: ICD-10-CM

## 2017-04-06 DIAGNOSIS — M25.649 DECREASED RANGE OF MOTION OF THUMB: ICD-10-CM

## 2017-04-06 DIAGNOSIS — M79.644 PAIN OF RIGHT THUMB: Primary | ICD-10-CM

## 2017-04-06 DIAGNOSIS — R68.89 DECREASED FUNCTIONAL ACTIVITY TOLERANCE: ICD-10-CM

## 2017-04-06 PROCEDURE — 97110 THERAPEUTIC EXERCISES: CPT | Mod: PN

## 2017-04-06 PROCEDURE — 97035 APP MDLTY 1+ULTRASOUND EA 15: CPT | Mod: PN

## 2017-04-06 PROCEDURE — L3933 FO W/O JOINTS CF: HCPCS | Mod: PN

## 2017-04-06 RX ORDER — LISINOPRIL 40 MG/1
TABLET ORAL
Qty: 90 TABLET | Refills: 0 | Status: SHIPPED | OUTPATIENT
Start: 2017-04-06 | End: 2017-07-05 | Stop reason: SDUPTHER

## 2017-04-06 NOTE — PROGRESS NOTES
"Occupational Therapy Daily Note and Orthotic Fabrication    Patient:  Carmen Hernandez  Date of Therapy Visit:  4/6/2017  Referring Physician:  Dr Tyrell Prather  Diagnosis: Right Thumb Trigger Finger  MRN : [id number]  Referral Orders:  Eval and treat     Start Time: 300pm  End Time:  410pm  Total Time:  70    Certification period from 4/14/17 to  5/30/17  Visit # 2  Diagnosis: Right trigger thumb    HISTORY/OCCUPATIONAL PROFILE/ Medical and Therapy History:  Subjective:  Date of onset:  January 2017  Total Time fabricating orthotic including fitting & training : 70 min  MDs orders for orthosis in referrals: yes  L code of orthotic fabricated:  Diagnosis (and resulting condition that requires a custom orthosis): Right trigger thumb  Purpose of the orthosis: to decrease inflammation of thumb FPL tendon  Daily Comments: "I don't know what happened to the sleeve you gave me...I lost it"   Pain:   On arrival to therapy:     7 out of 10   While working/ With Activity:  10 out of 10   On leaving therapy: 5/10   Location of Pain:  [facility] mp   Description of Pain:  Sharp and dull   Pain relived by: rest    Objective:  Treatment  MODALITIES:  Paraffin  CUS 3mhz x 8 mins @ .9w/cm2 to volar mp of right thumb to decrease inflammation and pain  THERAPEUTIC EXERCISES x 15 mins: to increase ROM, increase strength and increase functional use  -Thumb ROM ex e/f, Rab, Pab, Ad  ORTHOTIC FABRICATION:  -custom mp ring orthotic fabricated & custom mp ring with cmc palm lip fabricated; splint instructions an precautions reviewed    Assessment:   Medical necessity is demonstrated by the following IMPAIRMENTS/PROBLEMS:  Patient Lack of Knowledge/Awareness in Home Program  Increased Pain in Right  Decreased functional use/ unable to perform ADLs/iADLs  Increased Edema  Decreased ROM   Decreased  strength  Decreased pinch strength  Scar Adherent    Carmen presents to occupational therapy with an OT diagnosis of right trigger " thumb. She lost the neosleeve that I issued her last visit.  I fabricated her two custom made thumb trigger splints to address her trigger thumb as well as the decreased extension in her ip joint.  The first one is a mp ring splint to decrease ip flexion only, the second one is an mp ring splint with a volar lip that I added a velcro strap to that she can wear at night.  Carmen was provided instructions on orthosis purpose, wear schedule, care and precautions to monitor for orthosis for pressure areas, increased pain or increased edema. Patient was independent in donning and doffing orthosis while in the clinic. Orthosis instructions and precautions reviewed with patient and/or caregiver and understands they will contact me with any needs for adjustments. Patient would benefit from skilled OT to follow-up on orthotic modifications/adjustments.  We re-reviewed a detailed home program including gentle ROM, use of cryotherapy and splinting.   The patient requires skilled occupational therapy to address the problems identified above and to achieve the individual patient goals as outlined in the problems and goals section..      Patient has signed orthosis instruction/agreement form: Yes    Quality of fit of the orthotic fabricated:  excellent fit achieved; good fit achieved but will need to be adjusted as edema decreases for more intimate fit )  ?  Short Term Goals (2 weeks):   1) Patient will be independent in donning and doffing splint with precautions  2) Patient will have a proper fitting orthosis/splint with correct positioning and we will make adjustments as needed to accommodate for reduction in edema  Therapist's Recommendations:     GOALS:  Short Therm Goals: (2 weeks)    STG: Patient will be independent in home exercise and self care program    STG : Symptomatic Improvements: Decreasing Pain: to 2/10 for increased activity tolerance    STG: Patient to be IND with HEP and modalities for pain management   Long  Term Goals: (8-10 weeks)   LTG: Decrease edema in right wrist to WNLs for increased ability to hold a glass of water   LTG: Decrease complaints of pain to 0 out of 10 to increase tolerance for ADLs    LTG: Increase independence in the following ADLs/iADLs: use utensils to use a knife to prepare meal   LTG: Increase ROM to right = left in order to turn a doorknob   LTG: Increase functional use of right to  push a grocery cart for 10 minutes without pain   LTG: Increase  strength of right to open a tight jar   LTG: Increase lateral pinch strength when appropriate    Plan:   Graciela with established treatment plan

## 2017-04-10 ENCOUNTER — CLINICAL SUPPORT (OUTPATIENT)
Dept: REHABILITATION | Facility: HOSPITAL | Age: 57
End: 2017-04-10
Attending: ORTHOPAEDIC SURGERY
Payer: COMMERCIAL

## 2017-04-10 DIAGNOSIS — R68.89 DECREASED FUNCTIONAL ACTIVITY TOLERANCE: ICD-10-CM

## 2017-04-10 DIAGNOSIS — M79.644 PAIN OF RIGHT THUMB: Primary | ICD-10-CM

## 2017-04-10 DIAGNOSIS — M25.649 DECREASED RANGE OF MOTION OF THUMB: ICD-10-CM

## 2017-04-10 DIAGNOSIS — R29.898 DECREASED GRIP STRENGTH OF RIGHT HAND: ICD-10-CM

## 2017-04-10 PROCEDURE — 97018 PARAFFIN BATH THERAPY: CPT | Mod: PN

## 2017-04-10 PROCEDURE — 97035 APP MDLTY 1+ULTRASOUND EA 15: CPT | Mod: PN

## 2017-04-10 PROCEDURE — 97110 THERAPEUTIC EXERCISES: CPT | Mod: PN

## 2017-04-10 NOTE — PROGRESS NOTES
"Occupational Therapy Daily Note     Patient:  Carmen Hernandez  Date of Therapy Visit:  4/10/2017  Referring Physician:  Dr Tyrell Prather  Diagnosis: Right Thumb Trigger Finger  MRN : [id number]  Referral Orders:  Eval and treat     Start Time: 300pm  End Time:  410pm  Total Time:  70    Certification period from 4/14/17 to  5/30/17  Visit # 3  Diagnosis: Right trigger thumb    HISTORY/OCCUPATIONAL PROFILE/ Medical and Therapy History:  Subjective:  Date of onset:  January 2017  Daily Comments: "The taped really helped and so did the splints"   Pain:   On arrival to therapy:     7 out of 10   While working/ With Activity:  10 out of 10   On leaving therapy: 5/10   Location of Pain:  [facility] mp   Description of Pain:  Sharp and dull   Pain relived by: rest    Objective:  Treatment  MODALITIES:  Paraffin  CUS 3mhz x 8 mins @ .9w/cm2 to volar mp of right thumb to decrease inflammation and pain  THERAPEUTIC EXERCISES x 15 mins: to increase ROM, increase strength and increase functional use  -Thumb ROM ex e/f, Rab, Pab, Ad  ORTHOTIC FABRICATION:  -custom mp ring orthotic fabricated & custom mp ring with cmc palm lip fabricated; splint instructions an precautions reviewed  -kinesiotaping x 2 i strips    Assessment:   Medical necessity is demonstrated by the following IMPAIRMENTS/PROBLEMS:  Patient Lack of Knowledge/Awareness in Home Program  Increased Pain in Right  Decreased functional use/ unable to perform ADLs/iADLs  Increased Edema  Decreased ROM   Decreased  strength  Decreased pinch strength  Scar Adherent    Carmen presents to occupational therapy with an OT diagnosis of right trigger thumb. She has been compliant with both splints and they still fit well blocking flexion of the thumb ip joint.  The thick soft tissue nodule appears to have decreased since her last visit.  She states the kinesiotape helped her pain as well.   Patient would benefit from skilled OT to follow-up on orthotic " modifications/adjustments.  We re-reviewed a detailed home program including gentle ROM, use of cryotherapy and splinting.   The patient requires skilled occupational therapy to address the problems identified above and to achieve the individual patient goals as outlined in the problems and goals section..      GOALS:  Short Therm Goals: (2 weeks)    STG: Patient will be independent in home exercise and self care program    STG : Symptomatic Improvements: Decreasing Pain: to 2/10 for increased activity tolerance    STG: Patient to be IND with HEP and modalities for pain management   Long Term Goals: (8-10 weeks)   LTG: Decrease edema in right wrist to WNLs for increased ability to hold a glass of water   LTG: Decrease complaints of pain to 0 out of 10 to increase tolerance for ADLs    LTG: Increase independence in the following ADLs/iADLs: use utensils to use a knife to prepare meal   LTG: Increase ROM to right = left in order to turn a doorknob   LTG: Increase functional use of right to  push a grocery cart for 10 minutes without pain   LTG: Increase  strength of right to open a tight jar   LTG: Increase lateral pinch strength when appropriate    Plan:   Graciela with established treatment plan

## 2017-04-17 ENCOUNTER — LAB VISIT (OUTPATIENT)
Dept: LAB | Facility: HOSPITAL | Age: 57
End: 2017-04-17
Attending: INTERNAL MEDICINE
Payer: COMMERCIAL

## 2017-04-17 DIAGNOSIS — I10 HTN (HYPERTENSION), BENIGN: ICD-10-CM

## 2017-04-17 DIAGNOSIS — E11.9 DIABETES MELLITUS WITHOUT COMPLICATION: ICD-10-CM

## 2017-04-17 LAB
ALBUMIN SERPL BCP-MCNC: 3.6 G/DL
ALP SERPL-CCNC: 72 U/L
ALT SERPL W/O P-5'-P-CCNC: 44 U/L
ANION GAP SERPL CALC-SCNC: 9 MMOL/L
AST SERPL-CCNC: 30 U/L
BILIRUB SERPL-MCNC: 0.4 MG/DL
BUN SERPL-MCNC: 9 MG/DL
CALCIUM SERPL-MCNC: 9.6 MG/DL
CHLORIDE SERPL-SCNC: 106 MMOL/L
CO2 SERPL-SCNC: 24 MMOL/L
CREAT SERPL-MCNC: 0.7 MG/DL
EST. GFR  (AFRICAN AMERICAN): >60 ML/MIN/1.73 M^2
EST. GFR  (NON AFRICAN AMERICAN): >60 ML/MIN/1.73 M^2
GLUCOSE SERPL-MCNC: 200 MG/DL
POTASSIUM SERPL-SCNC: 4.6 MMOL/L
PROT SERPL-MCNC: 6.6 G/DL
SODIUM SERPL-SCNC: 139 MMOL/L
T4 FREE SERPL-MCNC: 1.02 NG/DL
TSH SERPL DL<=0.005 MIU/L-ACNC: 2.65 UIU/ML

## 2017-04-17 PROCEDURE — 36415 COLL VENOUS BLD VENIPUNCTURE: CPT | Mod: PO

## 2017-04-17 PROCEDURE — 84439 ASSAY OF FREE THYROXINE: CPT

## 2017-04-17 PROCEDURE — 80053 COMPREHEN METABOLIC PANEL: CPT

## 2017-04-17 PROCEDURE — 83036 HEMOGLOBIN GLYCOSYLATED A1C: CPT

## 2017-04-17 PROCEDURE — 84443 ASSAY THYROID STIM HORMONE: CPT

## 2017-04-18 LAB
ESTIMATED AVG GLUCOSE: 186 MG/DL
HBA1C MFR BLD HPLC: 8.1 %

## 2017-04-20 ENCOUNTER — CLINICAL SUPPORT (OUTPATIENT)
Dept: REHABILITATION | Facility: HOSPITAL | Age: 57
End: 2017-04-20
Attending: ORTHOPAEDIC SURGERY
Payer: COMMERCIAL

## 2017-04-20 DIAGNOSIS — R29.898 DECREASED GRIP STRENGTH OF RIGHT HAND: ICD-10-CM

## 2017-04-20 DIAGNOSIS — M25.649 DECREASED RANGE OF MOTION OF THUMB: ICD-10-CM

## 2017-04-20 DIAGNOSIS — R68.89 DECREASED FUNCTIONAL ACTIVITY TOLERANCE: ICD-10-CM

## 2017-04-20 DIAGNOSIS — M79.644 PAIN OF RIGHT THUMB: Primary | ICD-10-CM

## 2017-04-20 PROCEDURE — 97018 PARAFFIN BATH THERAPY: CPT | Mod: PN

## 2017-04-20 PROCEDURE — 97140 MANUAL THERAPY 1/> REGIONS: CPT | Mod: PN

## 2017-04-20 PROCEDURE — 97035 APP MDLTY 1+ULTRASOUND EA 15: CPT | Mod: PN

## 2017-04-20 PROCEDURE — 97110 THERAPEUTIC EXERCISES: CPT | Mod: PN

## 2017-04-20 NOTE — PROGRESS NOTES
"Occupational Therapy Daily Note     Patient:  Carmen Hernandez  Date of Therapy Visit:  4/20/2017  Referring Physician:  Dr Tyrell Prather  Diagnosis: Right Thumb Trigger Finger  MRN : [id number]  Referral Orders:  Eval and treat     Start Time: 300pm  End Time:  410pm  Total Time:  70    Certification period from 4/14/17 to  5/30/17  Visit # 4  Diagnosis: Right trigger thumb    HISTORY/OCCUPATIONAL PROFILE/ Medical and Therapy History:  Subjective:  Date of onset:  January 2017  Daily Comments: "I think it is getting better; I really think the tape helps"   Pain:   On arrival to therapy:     0 out of 10   While working/ With Activity:  3 out of 10   On leaving therapy: 0 out of 10   Location of Pain:  [facility] mp   Description of Pain:  Sharp and dull   Pain relived by: rest    Objective:  Treatment  MODALITIES:  Paraffin x12  CUS 3mhz x 8 mins @ .9w/cm2 to volar mp of right thumb to decrease inflammation and pain  THERAPEUTIC EXERCISES x 15 mins: to increase ROM, increase strength and increase functional use  -Thumb ROM ex e/f, Rab, Pab, Ad  -kinesiotaping x 2 i strips  MANUAL THERAPY:  -ADDED PROM to thumb mp and ip joint x 15  ORTHOTIC FABRICATION:  -custom mp ring orthotic fabricated & custom mp ring with cmc palm lip fabricated; splint instructions an precautions reviewed      Assessment:   Medical necessity is demonstrated by the following IMPAIRMENTS/PROBLEMS:  Patient Lack of Knowledge/Awareness in Home Program  Increased Pain in Right  Decreased functional use/ unable to perform ADLs/iADLs  Increased Edema  Decreased ROM   Decreased  strength  Decreased pinch strength  Scar Adherent    Carmen presents to occupational therapy with an OT diagnosis of right trigger thumb. Carmen is making good gains in therapy.  Her pain is decreasing overall.  She has had some joint stiffness in her thumb which improved with PROM today.  She has been compliant with both splints and they still fit well blocking flexion of " the thumb ip joint.  The thick soft tissue nodule appears to have decreased since her last visit.  She states the kinesiotape helped her pain as well.   Patient would benefit from skilled OT to follow-up on orthotic modifications/adjustments.  We re-reviewed a detailed home program including gentle ROM, use of cryotherapy and splinting.   The patient requires skilled occupational therapy to address the problems identified above and to achieve the individual patient goals as outlined in the problems and goals section..      GOALS:  Short Therm Goals: (2 weeks)    STG: Patient will be independent in home exercise and self care program    STG : Symptomatic Improvements: Decreasing Pain: to 2/10 for increased activity tolerance    STG: Patient to be IND with HEP and modalities for pain management   Long Term Goals: (8-10 weeks)   LTG: Decrease edema in right wrist to WNLs for increased ability to hold a glass of water   LTG: Decrease complaints of pain to 0 out of 10 to increase tolerance for ADLs    LTG: Increase independence in the following ADLs/iADLs: use utensils to use a knife to prepare meal   LTG: Increase ROM to right = left in order to turn a doorknob   LTG: Increase functional use of right to  push a grocery cart for 10 minutes without pain   LTG: Increase  strength of right to open a tight jar   LTG: Increase lateral pinch strength when appropriate    Plan:   Graciela with established treatment plan

## 2017-04-24 RX ORDER — METFORMIN HYDROCHLORIDE 500 MG/1
TABLET ORAL
Qty: 360 TABLET | Refills: 2 | Status: SHIPPED | OUTPATIENT
Start: 2017-04-24 | End: 2018-02-19 | Stop reason: SDUPTHER

## 2017-04-25 ENCOUNTER — OFFICE VISIT (OUTPATIENT)
Dept: ORTHOPEDICS | Facility: CLINIC | Age: 57
End: 2017-04-25
Payer: COMMERCIAL

## 2017-04-25 VITALS
HEART RATE: 76 BPM | DIASTOLIC BLOOD PRESSURE: 85 MMHG | WEIGHT: 230 LBS | HEIGHT: 64 IN | BODY MASS INDEX: 39.27 KG/M2 | SYSTOLIC BLOOD PRESSURE: 151 MMHG

## 2017-04-25 DIAGNOSIS — M65.311 TRIGGER FINGER OF RIGHT THUMB: Primary | ICD-10-CM

## 2017-04-25 DIAGNOSIS — M79.644 THUMB PAIN, RIGHT: ICD-10-CM

## 2017-04-25 PROCEDURE — 1160F RVW MEDS BY RX/DR IN RCRD: CPT | Mod: S$GLB,,, | Performed by: ORTHOPAEDIC SURGERY

## 2017-04-25 PROCEDURE — 3079F DIAST BP 80-89 MM HG: CPT | Mod: S$GLB,,, | Performed by: ORTHOPAEDIC SURGERY

## 2017-04-25 PROCEDURE — 99999 PR PBB SHADOW E&M-EST. PATIENT-LVL III: CPT | Mod: PBBFAC,,, | Performed by: ORTHOPAEDIC SURGERY

## 2017-04-25 PROCEDURE — 3077F SYST BP >= 140 MM HG: CPT | Mod: S$GLB,,, | Performed by: ORTHOPAEDIC SURGERY

## 2017-04-25 PROCEDURE — 20550 NJX 1 TENDON SHEATH/LIGAMENT: CPT | Mod: F5,S$GLB,, | Performed by: ORTHOPAEDIC SURGERY

## 2017-04-25 PROCEDURE — 99213 OFFICE O/P EST LOW 20 MIN: CPT | Mod: 25,S$GLB,, | Performed by: ORTHOPAEDIC SURGERY

## 2017-04-25 RX ORDER — TRIAMCINOLONE ACETONIDE 40 MG/ML
40 INJECTION, SUSPENSION INTRA-ARTICULAR; INTRAMUSCULAR
Status: DISCONTINUED | OUTPATIENT
Start: 2017-04-25 | End: 2017-04-25 | Stop reason: HOSPADM

## 2017-04-25 RX ADMIN — TRIAMCINOLONE ACETONIDE 40 MG: 40 INJECTION, SUSPENSION INTRA-ARTICULAR; INTRAMUSCULAR at 06:04

## 2017-04-25 NOTE — PROGRESS NOTES
Subjective:          Chief Complaint: Carmen Hernandez is a 57 y.o. female who had concerns including Pain of the Right Hand.    HPI Comments: Mrs. Hernandez is here for evaluation of her right thumb that palacios been injected previously. She subsequently developed a stiff right thumb IP joint and has been participating in therapy. She has not had any additional triggering secondary to splinting primarily. She is having pain over the A1 pulley however. There is also a palpable nodule    Pain: 2/10    Pain   Pertinent negatives include no chills or fever.       Review of Systems   Constitution: Negative for chills and fever.   Endocrine:        Diabetes   Hematologic/Lymphatic:        Anemia   Musculoskeletal: Positive for joint pain.   Psychiatric/Behavioral: The patient is nervous/anxious.    All other systems reviewed and are negative.                  Objective:        General: Carmen is well-developed, well-nourished, appears stated age, in no acute distress, alert and oriented to time, place and person.     General    Vitals reviewed.  Constitutional: She is oriented to person, place, and time. She appears well-developed and well-nourished. No distress.   HENT:   Head: Normocephalic and atraumatic.   Nose: Nose normal.   Eyes: Pupils are equal, round, and reactive to light.   Pulmonary/Chest: Effort normal.   Neurological: She is alert and oriented to person, place, and time.   Psychiatric: She has a normal mood and affect. Her behavior is normal. Judgment and thought content normal.             Right Hand/Wrist Exam     Inspection   Scars: Wrist - absent Hand -  absent  Effusion: Wrist - absent Hand -  absent  Bruising: Wrist - absent Hand -  absent  Deformity: Wrist - deformity Hand -  deformity    Pain   Hand - The patient exhibits pain of the thumb MCP.    Swelling   Hand - The patient is swollen on the thumb MCP.    Tenderness   The patient is tender to palpation of the patel area.    Range of Motion      Wrist   Extension: normal   Flexion: normal   Pronation: normal   Supination: normal     Tests     Atrophy   Thenar:  negative  Hypothenar:  negative  Intrinsic:  negative  1st Dorsal Interosseous: negative    Other     Neuorologic Exam    Median Distribution: normal  Ulnar Distribution: normal  Radial Distribution: normal    Comments:  Palpable click and NO active triggering on exam today of right thumb; palpable nodule with TTP      Left Hand/Wrist Exam   Left hand exam is normal.          Muscle Strength   Right Upper Extremity   Wrist Extension: 5/5/5   Wrist Flexion: 5/5/5   : 5/5/5   Index Finger: 5/5  Middle Finger: 5/5  Ring Finger: 5/5  Little Finger: 5/5  Thumb - APB: 5/5  Thumb - FPL: 5/5  Pinch Mechanism: 5/5    Vascular Exam       Capillary Refill  Right Hand: normal capillary refill    Previous radiographic studies and results were reviewed with the patient:   3 views right.  Bone, joint and soft tissues normal        Assessment:       Encounter Diagnoses   Name Primary?    Thumb pain, right Yes    Trigger finger of right thumb           Plan:           Meloxicam 15mg pO QD  Continue with OT for right thumb and bracing/splinting per therapist  Right thumb injection  F/U PRN

## 2017-04-25 NOTE — PROCEDURES
Tendon Sheath  Date/Time: 4/25/2017 6:36 PM  Performed by: CLARITA LAKE  Authorized by: CLARITA LAKE     Consent Done?: Yes (Verbal)  Timeout: prior to procedure the correct patient, procedure, and site was verified   Indications:  Pain  Site marked: the procedure site was marked    Timeout: prior to procedure the correct patient, procedure, and site was verified    Location:  Thumb  Site:  R thumb MCP  Prep: patient was prepped and draped in usual sterile fashion    Ultrasonic guidance for needle placement?: No    Needle size:  25 G  Approach:  [facility]  Medications:  40 mg triamcinolone acetonide 40 mg/mL  Patient tolerance:  Patient tolerated the procedure well with no immediate complications

## 2017-04-27 ENCOUNTER — CLINICAL SUPPORT (OUTPATIENT)
Dept: REHABILITATION | Facility: HOSPITAL | Age: 57
End: 2017-04-27
Attending: ORTHOPAEDIC SURGERY
Payer: COMMERCIAL

## 2017-04-27 DIAGNOSIS — R68.89 DECREASED FUNCTIONAL ACTIVITY TOLERANCE: ICD-10-CM

## 2017-04-27 DIAGNOSIS — R29.898 DECREASED GRIP STRENGTH OF RIGHT HAND: ICD-10-CM

## 2017-04-27 DIAGNOSIS — M79.644 PAIN OF RIGHT THUMB: ICD-10-CM

## 2017-04-27 DIAGNOSIS — M25.649 DECREASED RANGE OF MOTION OF THUMB: ICD-10-CM

## 2017-04-27 PROCEDURE — 97018 PARAFFIN BATH THERAPY: CPT | Mod: PN

## 2017-04-27 PROCEDURE — 97140 MANUAL THERAPY 1/> REGIONS: CPT | Mod: PN

## 2017-04-27 PROCEDURE — 97035 APP MDLTY 1+ULTRASOUND EA 15: CPT | Mod: PN

## 2017-04-27 PROCEDURE — 97110 THERAPEUTIC EXERCISES: CPT | Mod: PN

## 2017-04-27 NOTE — PROGRESS NOTES
"Occupational Therapy Daily Note     Patient:  Carmen Hernandez  Date of Therapy Visit:  4/27/2017  Referring Physician:  Dr Tyrell Prather  Diagnosis: Right Thumb Trigger Finger  MRN : [id number]  Referral Orders:  Eval and treat     Start Time: 300pm  End Time:  410pm  Total Time:  70    Certification period from 4/14/17 to  5/30/17  Visit # 4  Diagnosis: Right trigger thumb    HISTORY/OCCUPATIONAL PROFILE/ Medical and Therapy History:  Subjective:  Date of onset:  January 2017  Daily Comments: "I saw the doctor and she gave me a cortisone injection"   Pain:   On arrival to therapy:     0 out of 10   While working/ With Activity:  3 out of 10   On leaving therapy: 0 out of 10   Location of Pain:  [facility] mp   Description of Pain:  Sharp and dull   Pain relived by: rest    Objective:  Treatment  MODALITIES:  Paraffin x12  CUS 3mhz x 8 mins @ .9w/cm2 to volar mp of right thumb to decrease inflammation and pain  THERAPEUTIC EXERCISES x 15 mins: to increase ROM, increase strength and increase functional use  -Thumb ROM ex e/f, Rab, Pab, Ad  -kinesiotaping x 2 i strips  MANUAL THERAPY:  -ADDED PROM to thumb mp and ip joint x 15  ORTHOTIC FABRICATION:  -custom mp ring orthotic fabricated & custom mp ring with cmc palm lip fabricated; splint instructions an precautions reviewed  -ice massage with instructions and issued 2 x jason cups  -reviewed adaptive equipment/ spring loaded scissors      Assessment:   Medical necessity is demonstrated by the following IMPAIRMENTS/PROBLEMS:  Patient Lack of Knowledge/Awareness in Home Program  Increased Pain in Right  Decreased functional use/ unable to perform ADLs/iADLs  Increased Edema  Decreased ROM   Decreased  strength  Decreased pinch strength  Scar Adherent    Carmen presents to occupational therapy with an OT diagnosis of right trigger thumb. Carmen is making good gains in therapy.  She presents today with slightly increased edema around injection site and slight ip " flexion.  She did not have her splint today to make adjustments.  I am planning on placing the thumb dp in slight flexion to hyperextension.  I issued her instructions in ice massage with 2 jason cups and she was able to demonstrate. We reviewed adaptive equipment to decrease thumb pain including fiskars soft touch scissors.  She has been compliant with both splints and they still fit well blocking flexion of the thumb ip joint.   We continued with the kinesiotape and added a ring i strip over ip thumb.   Patient would benefit from skilled OT to follow-up on orthotic modifications/adjustments.  We re-reviewed a detailed home program including gentle ROM, use of cryotherapy and splinting.   The patient requires skilled occupational therapy to address the problems identified above and to achieve the individual patient goals as outlined in the problems and goals section..      GOALS:  Short Therm Goals: (2 weeks)    STG: Patient will be independent in home exercise and self care program    STG : Symptomatic Improvements: Decreasing Pain: to 2/10 for increased activity tolerance    STG: Patient to be IND with HEP and modalities for pain management   Long Term Goals: (8-10 weeks)   LTG: Decrease edema in right wrist to WNLs for increased ability to hold a glass of water   LTG: Decrease complaints of pain to 0 out of 10 to increase tolerance for ADLs    LTG: Increase independence in the following ADLs/iADLs: use utensils to use a knife to prepare meal   LTG: Increase ROM to right = left in order to turn a doorknob   LTG: Increase functional use of right to  push a grocery cart for 10 minutes without pain   LTG: Increase  strength of right to open a tight jar   LTG: Increase lateral pinch strength when appropriate    Plan:   Graciela with established treatment plan

## 2017-04-28 RX ORDER — ALPRAZOLAM 0.5 MG/1
TABLET ORAL
Qty: 100 TABLET | Refills: 0 | Status: CANCELLED | OUTPATIENT
Start: 2017-04-28

## 2017-04-28 RX ORDER — ALPRAZOLAM 0.5 MG/1
TABLET ORAL
Qty: 100 TABLET | Refills: 0 | Status: SHIPPED | OUTPATIENT
Start: 2017-04-28 | End: 2017-05-26 | Stop reason: SDUPTHER

## 2017-04-28 NOTE — TELEPHONE ENCOUNTER
----- Message from Shankar Lazo MA sent at 4/28/2017  3:36 PM CDT -----  Contact: self - [phone]   Please advise status if refill request for alprazolam (XANAX) 0.5 MG tablet. Please call. Thanks!

## 2017-04-28 NOTE — TELEPHONE ENCOUNTER
----- Message from Magdalena Banks sent at 4/27/2017  2:46 PM CDT -----  Contact: patient [phone]  Pt has tried to refill alprazolam and [facility] told her that they have no response to requests x 3 days. Please call to advise pt if this is going to be approved.

## 2017-05-01 ENCOUNTER — CLINICAL SUPPORT (OUTPATIENT)
Dept: REHABILITATION | Facility: HOSPITAL | Age: 57
End: 2017-05-01
Attending: ORTHOPAEDIC SURGERY
Payer: COMMERCIAL

## 2017-05-01 DIAGNOSIS — M25.649 DECREASED RANGE OF MOTION OF THUMB: ICD-10-CM

## 2017-05-01 DIAGNOSIS — R68.89 DECREASED FUNCTIONAL ACTIVITY TOLERANCE: ICD-10-CM

## 2017-05-01 DIAGNOSIS — R29.898 DECREASED GRIP STRENGTH OF RIGHT HAND: ICD-10-CM

## 2017-05-01 DIAGNOSIS — M79.644 PAIN OF RIGHT THUMB: Primary | ICD-10-CM

## 2017-05-01 PROCEDURE — 97530 THERAPEUTIC ACTIVITIES: CPT | Mod: PN

## 2017-05-01 PROCEDURE — 97110 THERAPEUTIC EXERCISES: CPT | Mod: PN

## 2017-05-01 PROCEDURE — 97018 PARAFFIN BATH THERAPY: CPT | Mod: PN

## 2017-05-01 PROCEDURE — 97035 APP MDLTY 1+ULTRASOUND EA 15: CPT | Mod: PN

## 2017-05-02 NOTE — PROGRESS NOTES
"Occupational Therapy Daily Note     Patient:  Carmen Hernandez  Date of Therapy Visit:  5/1/2017  Referring Physician:  Dr Tyrell Prather  Diagnosis: Right Thumb Trigger Finger  MRN : [id number]  Referral Orders:  Eval and treat     Start Time: 200pm  End Time:  310pm  Total Time:  70    Certification period from 4/14/17 to  5/30/17  Visit # 6  Diagnosis: Right trigger thumb    HISTORY/OCCUPATIONAL PROFILE/ Medical and Therapy History:  Subjective:  Date of onset:  January 2017  Daily Comments: "I think the swelling has gone down from the shot"   Pain:   On arrival to therapy:     0 out of 10   While working/ With Activity:  3 out of 10   On leaving therapy: 0 out of 10   Location of Pain:  [facility] mp   Description of Pain:  Sharp and dull   Pain relived by: rest    Objective:  Treatment  MODALITIES:  Paraffin x12  CUS 3mhz x 8 mins @ .9w/cm2 to volar mp of right thumb to decrease inflammation and pain  THERAPEUTIC EXERCISES x 15 mins: to increase ROM, increase strength and increase functional use  -Thumb ROM ex e/f, Rab, Pab, Ad  -kinesiotaping x 2 i strips  MANUAL THERAPY:  -ADDED PROM to thumb mp and ip joint x 15  ORTHOTIC FABRICATION:  -custom mp ring orthotic fabricated & custom mp ring with cmc palm lip fabricated; splint instructions an precautions reviewed  -ice massage with instructions and issued 2 x jason cups  -reviewed adaptive equipment/ spring loaded scissors  THERAPEUTIC ACTIVITIES:  -adaptive equipment review  -adjusted orthotics x2; padding and extra volar lip portion for thumb ip extension      Assessment:   Medical necessity is demonstrated by the following IMPAIRMENTS/PROBLEMS:  Patient Lack of Knowledge/Awareness in Home Program  Increased Pain in Right  Decreased functional use/ unable to perform ADLs/iADLs  Increased Edema  Decreased ROM   Decreased  strength  Decreased pinch strength  Scar Adherent    Carmen presents to occupational therapy with an OT diagnosis of right trigger thumb. " Carmen is making good gains in therapy.  Edema around injection site has decreased since last and slight ip flexion.  She brought her splints today and I made adjustments to both.  One splint I added a velcro strap to help hold the orthoplast sleeve and the 2nd splint, I added an extra volar portion to allow the ip of the thumb to go into full extension/ slight hyperextension.  We continued with the kinesiotape and added a ring i strip over ip thumb.   Patient would benefit from skilled OT to follow-up on orthotic modifications/adjustments.  We re-reviewed a detailed home program including gentle ROM, use of cryotherapy and splinting.   The patient requires skilled occupational therapy to address the problems identified above and to achieve the individual patient goals as outlined in the problems and goals section..      GOALS:  Short Therm Goals: (2 weeks)    STG: Patient will be independent in home exercise and self care program    STG : Symptomatic Improvements: Decreasing Pain: to 2/10 for increased activity tolerance    STG: Patient to be IND with HEP and modalities for pain management   Long Term Goals: (8-10 weeks)   LTG: Decrease edema in right wrist to WNLs for increased ability to hold a glass of water   LTG: Decrease complaints of pain to 0 out of 10 to increase tolerance for ADLs    LTG: Increase independence in the following ADLs/iADLs: use utensils to use a knife to prepare meal   LTG: Increase ROM to right = left in order to turn a doorknob   LTG: Increase functional use of right to  push a grocery cart for 10 minutes without pain   LTG: Increase  strength of right to open a tight jar   LTG: Increase lateral pinch strength when appropriate    Plan:   Graciela with established treatment plan

## 2017-05-08 ENCOUNTER — CLINICAL SUPPORT (OUTPATIENT)
Dept: REHABILITATION | Facility: HOSPITAL | Age: 57
End: 2017-05-08
Attending: ORTHOPAEDIC SURGERY
Payer: COMMERCIAL

## 2017-05-08 DIAGNOSIS — R68.89 DECREASED FUNCTIONAL ACTIVITY TOLERANCE: ICD-10-CM

## 2017-05-08 DIAGNOSIS — M25.649 DECREASED RANGE OF MOTION OF THUMB: ICD-10-CM

## 2017-05-08 DIAGNOSIS — R29.898 DECREASED GRIP STRENGTH OF RIGHT HAND: ICD-10-CM

## 2017-05-08 DIAGNOSIS — M79.644 PAIN OF RIGHT THUMB: Primary | ICD-10-CM

## 2017-05-08 PROCEDURE — 97140 MANUAL THERAPY 1/> REGIONS: CPT | Mod: 59,PN

## 2017-05-08 PROCEDURE — 97530 THERAPEUTIC ACTIVITIES: CPT | Mod: PN

## 2017-05-08 PROCEDURE — 97018 PARAFFIN BATH THERAPY: CPT | Mod: PN

## 2017-05-08 PROCEDURE — 97035 APP MDLTY 1+ULTRASOUND EA 15: CPT | Mod: PN

## 2017-05-09 NOTE — PROGRESS NOTES
"Occupational Therapy Daily Note     Patient:  Carmen Hernandez  Date of Therapy Visit:  5/8/2017  Referring Physician:  Dr Tyrell Prather  Diagnosis: Right Thumb Trigger Finger  MRN : [id number]  Referral Orders:  Eval and treat     Start Time: 300pm  End Time:  410pm  Total Time:  70    Certification period from 4/14/17 to  5/30/17  Visit # 7  Diagnosis: Right trigger thumb    HISTORY/OCCUPATIONAL PROFILE/ Medical and Therapy History:  Subjective:  Date of onset:  January 2017  Daily Comments: "I need my splints to be higher"   Pain:   On arrival to therapy:     0 out of 10   While working/ With Activity:  3 out of 10   On leaving therapy: 0 out of 10   Location of Pain:  [place]   Description of Pain:  Sharp and dull   Pain relived by: rest    Objective:  Treatment  MODALITIES:  Paraffin x12  CUS 3mhz x 8 mins @ .9w/cm2 to volar mp of right thumb to decrease inflammation and pain  THERAPEUTIC EXERCISES x 15 mins: to increase ROM, increase strength and increase functional use  -Thumb ROM ex e/f, Rab, Pab, Ad  -kinesiotaping x 2 i strips  MANUAL THERAPY:  -ADDED PROM to thumb mp and ip joint x 15  ORTHOTIC FABRICATION:  -custom mp ring orthotic fabricated & custom mp ring with cmc palm lip fabricated; splint instructions an precautions reviewed  -ice massage with instructions and issued 2 x jason cups  -reviewed adaptive equipment/ spring loaded scissors  THERAPEUTIC ACTIVITIES:  -adaptive equipment review  -adjusted orthotics x2; padding and extra volar lip portion for thumb ip extension      Assessment:   Medical necessity is demonstrated by the following IMPAIRMENTS/PROBLEMS:  Patient Lack of Knowledge/Awareness in Home Program  Increased Pain in Right  Decreased functional use/ unable to perform ADLs/iADLs  Increased Edema  Decreased ROM   Decreased  strength  Decreased pinch strength  Scar Adherent     Carmen presents to occupational therapy with an OT diagnosis of right trigger thumb. Carmen is making " good gains in therapy. Soft tissue thickening is decreasing.  I  made adjustments to both splints making the ips higher.   We continued with the kinesiotape and added a ring i strip over ip thumb.   Patient would benefit from skilled OT to follow-up on orthotic modifications/adjustments.  We re-reviewed a detailed home program including gentle ROM, use of cryotherapy and splinting.   The patient requires skilled occupational therapy to address the problems identified above and to achieve the individual patient goals as outlined in the problems and goals section..      GOALS:  Short Therm Goals: (2 weeks)    STG: Patient will be independent in home exercise and self care program    STG : Symptomatic Improvements: Decreasing Pain: to 2/10 for increased activity tolerance    STG: Patient to be IND with HEP and modalities for pain management   Long Term Goals: (8-10 weeks)   LTG: Decrease edema in right wrist to WNLs for increased ability to hold a glass of water   LTG: Decrease complaints of pain to 0 out of 10 to increase tolerance for ADLs    LTG: Increase independence in the following ADLs/iADLs: use utensils to use a knife to prepare meal   LTG: Increase ROM to right = left in order to turn a doorknob   LTG: Increase functional use of right to  push a grocery cart for 10 minutes without pain   LTG: Increase  strength of right to open a tight jar   LTG: Increase lateral pinch strength when appropriate    Plan:   Graciela with established treatment plan

## 2017-05-10 ENCOUNTER — CLINICAL SUPPORT (OUTPATIENT)
Dept: REHABILITATION | Facility: HOSPITAL | Age: 57
End: 2017-05-10
Attending: ORTHOPAEDIC SURGERY
Payer: COMMERCIAL

## 2017-05-10 DIAGNOSIS — M79.644 PAIN OF RIGHT THUMB: ICD-10-CM

## 2017-05-10 DIAGNOSIS — R29.898 DECREASED GRIP STRENGTH OF RIGHT HAND: ICD-10-CM

## 2017-05-10 DIAGNOSIS — M25.649 DECREASED RANGE OF MOTION OF THUMB: Primary | ICD-10-CM

## 2017-05-10 DIAGNOSIS — R68.89 DECREASED FUNCTIONAL ACTIVITY TOLERANCE: ICD-10-CM

## 2017-05-10 PROCEDURE — 97035 APP MDLTY 1+ULTRASOUND EA 15: CPT | Mod: PN

## 2017-05-10 PROCEDURE — 97018 PARAFFIN BATH THERAPY: CPT | Mod: PN

## 2017-05-10 PROCEDURE — 97110 THERAPEUTIC EXERCISES: CPT | Mod: PN

## 2017-05-10 NOTE — PROGRESS NOTES
"Occupational Therapy Daily Note     Patient:  Carmen Hernandez  Date of Therapy Visit:  5/10/2017  Referring Physician:  Dr Tyrell Prather  Diagnosis: Right Thumb Trigger Finger  MRN : [id number]  Referral Orders:  Eval and treat     Start Time: 300pm  End Time:  410pm  Total Time:  70    Certification period from 4/14/17 to  5/30/17  Visit # 8  Diagnosis: Right trigger thumb    HISTORY/OCCUPATIONAL PROFILE/ Medical and Therapy History:  Subjective:  Date of onset:  January 2017  Daily Comments: "I think it helps a lot when you tape my thumb"   Pain:   On arrival to therapy:     0 out of 10   While working/ With Activity:  3 out of 10   On leaving therapy: 0 out of 10   Location of Pain:  [place] [facility]   Description of Pain:  Sharp and dull   Pain relived by: rest    Objective:  Treatment  MODALITIES:  Paraffin x12  CUS 3mhz x 8 mins @ .9w/cm2 to volar mp of right thumb to decrease inflammation and pain  THERAPEUTIC EXERCISES x 15 mins: to increase ROM, increase strength and increase functional use  -Thumb ROM ex e/f, Rab, Pab, Ad  -kinesiotaping x 2 i strips  MANUAL THERAPY:  -ADDED PROM to thumb mp and ip joint x 15  ORTHOTIC FABRICATION:  -custom mp ring orthotic fabricated & custom mp ring with cmc palm lip fabricated; splint instructions an precautions reviewed  -ice massage with instructions and issued 2 x jason cups  -reviewed adaptive equipment/ spring loaded scissors  THERAPEUTIC ACTIVITIES:  -adaptive equipment review  -adjusted orthotics x2; padding and extra volar lip portion for thumb ip extension      Assessment:   Medical necessity is demonstrated by the following IMPAIRMENTS/PROBLEMS:  Patient Lack of Knowledge/Awareness in Home Program  Increased Pain in Right  Decreased functional use/ unable to perform ADLs/iADLs  Increased Edema  Decreased ROM   Decreased  strength  Decreased pinch strength  Scar Adherent     Carmen presents to occupational therapy with an OT diagnosis of right trigger thumb. " Carmen is making good gains in therapy. She is having some tenderness at the ip of the thumb.  I  made adjustments to both splints and cut approximately 4mm of the tip to allow for sensation..   We continued with the kinesiotape and added a ring i strip over ip thumb.   Patient would benefit from skilled OT to follow-up on orthotic modifications/adjustments.  We re-reviewed a detailed home program including gentle ROM, use of cryotherapy and splinting.   The patient requires skilled occupational therapy to address the problems identified above and to achieve the individual patient goals as outlined in the problems and goals section..      GOALS:  Short Therm Goals: (2 weeks)    STG: Patient will be independent in home exercise and self care program    STG : Symptomatic Improvements: Decreasing Pain: to 2/10 for increased activity tolerance    STG: Patient to be IND with HEP and modalities for pain management   Long Term Goals: (8-10 weeks)   LTG: Decrease edema in right wrist to WNLs for increased ability to hold a glass of water   LTG: Decrease complaints of pain to 0 out of 10 to increase tolerance for ADLs    LTG: Increase independence in the following ADLs/iADLs: use utensils to use a knife to prepare meal   LTG: Increase ROM to right = left in order to turn a doorknob   LTG: Increase functional use of right to  push a grocery cart for 10 minutes without pain   LTG: Increase  strength of right to open a tight jar   LTG: Increase lateral pinch strength when appropriate    Plan:   Graciela with established treatment plan

## 2017-05-15 ENCOUNTER — CLINICAL SUPPORT (OUTPATIENT)
Dept: REHABILITATION | Facility: HOSPITAL | Age: 57
End: 2017-05-15
Attending: ORTHOPAEDIC SURGERY
Payer: COMMERCIAL

## 2017-05-15 DIAGNOSIS — M25.649 DECREASED RANGE OF MOTION OF THUMB: Primary | ICD-10-CM

## 2017-05-15 DIAGNOSIS — R68.89 DECREASED FUNCTIONAL ACTIVITY TOLERANCE: ICD-10-CM

## 2017-05-15 DIAGNOSIS — R29.898 DECREASED GRIP STRENGTH OF RIGHT HAND: ICD-10-CM

## 2017-05-15 DIAGNOSIS — M79.644 PAIN OF RIGHT THUMB: ICD-10-CM

## 2017-05-15 PROCEDURE — 97018 PARAFFIN BATH THERAPY: CPT | Mod: PN

## 2017-05-15 PROCEDURE — 97035 APP MDLTY 1+ULTRASOUND EA 15: CPT | Mod: PN

## 2017-05-15 PROCEDURE — 97110 THERAPEUTIC EXERCISES: CPT | Mod: PN

## 2017-05-15 NOTE — PROGRESS NOTES
"Occupational Therapy Daily Note     Patient:  Carmen Hernandez  Date of Therapy Visit:  5/15/2017  Referring Physician:  Dr Tyrell Prather  Diagnosis: Right Thumb Trigger Finger  MRN : [id number]  Referral Orders:  Eval and treat     Start Time: 300pm  End Time:  410pm  Total Time:  70    Certification period from 4/14/17 to  5/30/17  Visit # 9  Diagnosis: Right trigger thumb    HISTORY/OCCUPATIONAL PROFILE/ Medical and Therapy History:  Subjective:  Date of onset:  January 2017  Daily Comments: "Will I be able to bend my thumb normally again?"   Pain:   On arrival to therapy:     0 out of 10   While working/ With Activity:  3 out of 10   On leaving therapy: 0 out of 10   Location of Pain:  [place] [facility]   Description of Pain:  Sharp and dull   Pain relived by: rest    Objective:  Treatment  MODALITIES:  Paraffin x12  CUS 3mhz x 8 mins @ .9w/cm2 to volar mp of right thumb to decrease inflammation and pain  THERAPEUTIC EXERCISES x 15 mins: to increase ROM, increase strength and increase functional use  -Thumb ROM ex e/f, Rab, Pab, Ad  -kinesiotaping x 2 i strips  MANUAL THERAPY:  -ADDED PROM to thumb mp and ip joint x 15  ORTHOTIC FABRICATION:  -custom mp ring orthotic fabricated & custom mp ring with cmc palm lip fabricated; splint instructions an precautions reviewed  -ice massage with instructions and issued 2 x jason cups  -reviewed adaptive equipment/ spring loaded scissors  THERAPEUTIC ACTIVITIES:  -adaptive equipment review  -adjusted orthotics x2; padding and extra volar lip portion for thumb ip extension      Assessment:   Medical necessity is demonstrated by the following IMPAIRMENTS/PROBLEMS:  Patient Lack of Knowledge/Awareness in Home Program  Increased Pain in Right  Decreased functional use/ unable to perform ADLs/iADLs  Increased Edema  Decreased ROM   Decreased  strength  Decreased pinch strength  Scar Adherent     Carmen presents to occupational therapy with an OT diagnosis of right trigger thumb. " Carmen is making good gains in therapy. She prefers to only have 2 istrips of tape as the thumb ip strip felt constricting.  She continues to have a palpable soft tissue nodule at the volar thumb pulley but this is decreasing in tenderness and in size.  Both splints are fitting well and she is compliant with wearing.  We continued with the kinesiotape and added a ring i strip over ip thumb.   Patient would benefit from skilled OT to follow-up on orthotic modifications/adjustments.  We re-reviewed a detailed home program including gentle ROM, use of cryotherapy and splinting.   The patient requires skilled occupational therapy to address the problems identified above and to achieve the individual patient goals as outlined in the problems and goals section..      GOALS:  Short Therm Goals: (2 weeks)    STG: Patient will be independent in home exercise and self care program    STG : Symptomatic Improvements: Decreasing Pain: to 2/10 for increased activity tolerance    STG: Patient to be IND with HEP and modalities for pain management   Long Term Goals: (8-10 weeks)   LTG: Decrease edema in right wrist to WNLs for increased ability to hold a glass of water   LTG: Decrease complaints of pain to 0 out of 10 to increase tolerance for ADLs    LTG: Increase independence in the following ADLs/iADLs: use utensils to use a knife to prepare meal   LTG: Increase ROM to right = left in order to turn a doorknob   LTG: Increase functional use of right to  push a grocery cart for 10 minutes without pain   LTG: Increase  strength of right to open a tight jar   LTG: Increase lateral pinch strength when appropriate    Plan:   Graciela with established treatment plan

## 2017-05-17 ENCOUNTER — CLINICAL SUPPORT (OUTPATIENT)
Dept: REHABILITATION | Facility: HOSPITAL | Age: 57
End: 2017-05-17
Attending: ORTHOPAEDIC SURGERY
Payer: COMMERCIAL

## 2017-05-17 DIAGNOSIS — R29.898 DECREASED GRIP STRENGTH OF RIGHT HAND: ICD-10-CM

## 2017-05-17 DIAGNOSIS — M79.644 PAIN OF RIGHT THUMB: ICD-10-CM

## 2017-05-17 DIAGNOSIS — M25.649 DECREASED RANGE OF MOTION OF THUMB: Primary | ICD-10-CM

## 2017-05-17 DIAGNOSIS — R68.89 DECREASED FUNCTIONAL ACTIVITY TOLERANCE: ICD-10-CM

## 2017-05-17 PROCEDURE — 97035 APP MDLTY 1+ULTRASOUND EA 15: CPT | Mod: PN

## 2017-05-17 PROCEDURE — 97110 THERAPEUTIC EXERCISES: CPT | Mod: PN

## 2017-05-17 PROCEDURE — 97018 PARAFFIN BATH THERAPY: CPT | Mod: PN

## 2017-05-17 NOTE — PROGRESS NOTES
"Occupational Therapy Daily Note     Patient:  Carmen Hernandez  Date of Therapy Visit:  5/17/2017  Referring Physician:  Dr Tyrell Prather  Diagnosis: Right Thumb Trigger Finger  MRN : [id number]  Referral Orders:  Eval and treat     Start Time: 300pm  End Time:  410pm  Total Time:  70    Certification period from 4/14/17 to  5/30/17  Visit # 9  Diagnosis: Right trigger thumb    HISTORY/OCCUPATIONAL PROFILE/ Medical and Therapy History:  Subjective:  Date of onset:  January 2017  Daily Comments: "Will I be able to bend my thumb normally again?"   Pain:   On arrival to therapy:     0 out of 10   While working/ With Activity:  3 out of 10   On leaving therapy: 0 out of 10   Location of Pain:  [place] [facility]   Description of Pain:  Sharp and dull   Pain relived by: rest    Objective:  Treatment  MODALITIES:  Paraffin x12  CUS 3mhz x 8 mins @ .9w/cm2 to volar mp of right thumb to decrease inflammation and pain  THERAPEUTIC EXERCISES x 15 mins: to increase ROM, increase strength and increase functional use  -Thumb ROM ex e/f, Rab, Pab, Ad  -kinesiotaping x 2 i strips  MANUAL THERAPY:  -ADDED PROM to thumb mp and ip joint x 15  ORTHOTIC FABRICATION:  -custom mp ring orthotic fabricated & custom mp ring with cmc palm lip fabricated; splint instructions an precautions reviewed  -ice massage with instructions and issued 2 x jason cups  -reviewed adaptive equipment/ spring loaded scissors  THERAPEUTIC ACTIVITIES:  -adaptive equipment review  -adjusted orthotics x2; padding and extra volar lip portion for thumb ip extension      Assessment:   Medical necessity is demonstrated by the following IMPAIRMENTS/PROBLEMS:  Patient Lack of Knowledge/Awareness in Home Program  Increased Pain in Right  Decreased functional use/ unable to perform ADLs/iADLs  Increased Edema  Decreased ROM   Decreased  strength  Decreased pinch strength  Scar Adherent     Carmen presents to occupational therapy with an OT diagnosis of right trigger thumb. " During gentle ROM if thumb ip joint, Carmen's thumb triggered and locked into flexion requiring assistance to unlock it.  We added ice massage today to the thumb pulley to assist with decreasing inflammation.   She prefers to only have 2 istrips of tape as the thumb ip strip felt constricting.  Both splints are fitting well and she is compliant with wearing.  Patient would benefit from skilled OT to follow-up on orthotic modifications/adjustments.  We re-reviewed a detailed home program including gentle ROM, use of cryotherapy and splinting.   The patient requires skilled occupational therapy to address the problems identified above and to achieve the individual patient goals as outlined in the problems and goals section..      GOALS:  Short Therm Goals: (2 weeks)    STG: Patient will be independent in home exercise and self care program    STG : Symptomatic Improvements: Decreasing Pain: to 2/10 for increased activity tolerance    STG: Patient to be IND with HEP and modalities for pain management   Long Term Goals: (8-10 weeks)   LTG: Decrease edema in right wrist to WNLs for increased ability to hold a glass of water   LTG: Decrease complaints of pain to 0 out of 10 to increase tolerance for ADLs    LTG: Increase independence in the following ADLs/iADLs: use utensils to use a knife to prepare meal   LTG: Increase ROM to right = left in order to turn a doorknob   LTG: Increase functional use of right to  push a grocery cart for 10 minutes without pain   LTG: Increase  strength of right to open a tight jar   LTG: Increase lateral pinch strength when appropriate    Plan:   Graciela with established treatment plan

## 2017-05-22 ENCOUNTER — CLINICAL SUPPORT (OUTPATIENT)
Dept: REHABILITATION | Facility: HOSPITAL | Age: 57
End: 2017-05-22
Attending: INTERNAL MEDICINE
Payer: COMMERCIAL

## 2017-05-22 DIAGNOSIS — M79.644 PAIN OF RIGHT THUMB: ICD-10-CM

## 2017-05-22 DIAGNOSIS — R29.898 DECREASED GRIP STRENGTH OF RIGHT HAND: ICD-10-CM

## 2017-05-22 DIAGNOSIS — R68.89 DECREASED FUNCTIONAL ACTIVITY TOLERANCE: ICD-10-CM

## 2017-05-22 DIAGNOSIS — M25.649 DECREASED RANGE OF MOTION OF THUMB: Primary | ICD-10-CM

## 2017-05-22 PROCEDURE — 97018 PARAFFIN BATH THERAPY: CPT | Mod: PN

## 2017-05-22 PROCEDURE — 97530 THERAPEUTIC ACTIVITIES: CPT | Mod: 59,PN

## 2017-05-22 PROCEDURE — 97110 THERAPEUTIC EXERCISES: CPT | Mod: PN

## 2017-05-22 PROCEDURE — 97035 APP MDLTY 1+ULTRASOUND EA 15: CPT | Mod: PN

## 2017-05-22 NOTE — PROGRESS NOTES
"Occupational Therapy Daily Note     Patient:  Carmen Hernandez  Date of Therapy Visit:  5/22/2017  Referring Physician:  Dr Tyrell Prather  Diagnosis: Right Thumb Trigger Finger  MRN : [id number]  Referral Orders:  Eval and treat     Start Time: 300pm  End Time:  400pm  Total Time:  60    Certification period from 4/14/17 to  5/30/17  Visit # 10  Diagnosis: Right trigger thumb    HISTORY/OCCUPATIONAL PROFILE/ Medical and Therapy History:  Subjective:  Date of onset:  January 2017  Daily Comments: "I need some padding on my splint"   Pain:   On arrival to therapy:     0 out of 10   While working/ With Activity:  3 out of 10   On leaving therapy: 0 out of 10   Location of Pain:  [place] [facility]   Description of Pain:  Sharp and dull   Pain relived by: rest    Objective:  Treatment  MODALITIES:  Paraffin x12  CUS 3mhz x 8 mins @ .9w/cm2 to volar mp of right thumb to decrease inflammation and pain  THERAPEUTIC EXERCISES x 15 mins: to increase ROM, increase strength and increase functional use  -Thumb ROM ex e/f, Rab, Pab, Ad  -kinesiotaping x 2 i strips  MANUAL THERAPY:  -ADDED PROM to thumb mp and ip joint x 15  ORTHOTIC FABRICATION:  -custom mp ring orthotic fabricated & custom mp ring with cmc palm lip fabricated; splint instructions an precautions reviewed  -ice massage with instructions and issued 2 x jason cups  -reviewed adaptive equipment/ spring loaded scissors  THERAPEUTIC ACTIVITIES:  -adaptive equipment review  -adjusted orthotics x2; padding and extra volar lip portion for thumb ip extension      Assessment:   Medical necessity is demonstrated by the following IMPAIRMENTS/PROBLEMS:  Patient Lack of Knowledge/Awareness in Home Program  Increased Pain in Right  Decreased functional use/ unable to perform ADLs/iADLs  Increased Edema  Decreased ROM   Decreased  strength  Decreased pinch strength  Scar Adherent     Carmen presents to occupational therapy with an OT diagnosis of right trigger thumb. She is " compliant with orthoses.  I added padding to dorsal mp of thumb for comfort.  She continues to have soft tissue thickening at volar mp.  She states it is less tender.  We continued with kinesiotaping x2 i stripsPatient would benefit from skilled OT to follow-up on orthotic modifications/adjustments.  We re-reviewed a detailed home program including gentle ROM, use of cryotherapy and splinting.   The patient requires skilled occupational therapy to address the problems identified above and to achieve the individual patient goals as outlined in the problems and goals section..      GOALS:  Short Therm Goals: (2 weeks)    STG: Patient will be independent in home exercise and self care program    STG : Symptomatic Improvements: Decreasing Pain: to 2/10 for increased activity tolerance    STG: Patient to be IND with HEP and modalities for pain management   Long Term Goals: (8-10 weeks)   LTG: Decrease edema in right wrist to WNLs for increased ability to hold a glass of water   LTG: Decrease complaints of pain to 0 out of 10 to increase tolerance for ADLs    LTG: Increase independence in the following ADLs/iADLs: use utensils to use a knife to prepare meal   LTG: Increase ROM to right = left in order to turn a doorknob   LTG: Increase functional use of right to  push a grocery cart for 10 minutes without pain   LTG: Increase  strength of right to open a tight jar   LTG: Increase lateral pinch strength when appropriate    Plan:   Graciela with established treatment plan

## 2017-05-26 RX ORDER — ALPRAZOLAM 0.5 MG/1
TABLET ORAL
Qty: 100 TABLET | Refills: 0 | Status: SHIPPED | OUTPATIENT
Start: 2017-05-26 | End: 2017-06-22 | Stop reason: SDUPTHER

## 2017-05-26 NOTE — TELEPHONE ENCOUNTER
----- Message from Megan Bailey sent at 5/26/2017 11:54 AM CDT -----  -Pt Carmen Hernandez called to inquire as to why her prescription for alprazolam (XANAX) 0.5 MG tablet has not been authorized  Please call pt @ [phone] and advise

## 2017-05-29 ENCOUNTER — CLINICAL SUPPORT (OUTPATIENT)
Dept: REHABILITATION | Facility: HOSPITAL | Age: 57
End: 2017-05-29
Attending: ORTHOPAEDIC SURGERY
Payer: COMMERCIAL

## 2017-05-29 DIAGNOSIS — R29.898 DECREASED GRIP STRENGTH OF RIGHT HAND: ICD-10-CM

## 2017-05-29 DIAGNOSIS — M25.649 DECREASED RANGE OF MOTION OF THUMB: Primary | ICD-10-CM

## 2017-05-29 DIAGNOSIS — R68.89 DECREASED FUNCTIONAL ACTIVITY TOLERANCE: ICD-10-CM

## 2017-05-29 DIAGNOSIS — M79.644 PAIN OF RIGHT THUMB: ICD-10-CM

## 2017-05-29 PROCEDURE — 97110 THERAPEUTIC EXERCISES: CPT | Mod: PN

## 2017-05-29 PROCEDURE — 97530 THERAPEUTIC ACTIVITIES: CPT | Mod: PN

## 2017-05-29 PROCEDURE — 97035 APP MDLTY 1+ULTRASOUND EA 15: CPT | Mod: PN

## 2017-05-29 PROCEDURE — 97018 PARAFFIN BATH THERAPY: CPT | Mod: PN

## 2017-05-29 NOTE — PROGRESS NOTES
"Occupational Therapy Daily Note     Patient:  Carmen Hernandez  Date of Therapy Visit:  5/29/2017  Referring Physician:  Dr Tyrell Prather  Diagnosis: Right Thumb Trigger Finger  MRN : [id number]  Referral Orders:  Eval and treat     Start Time: 300pm  End Time:  400pm  Total Time:  60    Certification period from 4/14/17 to  5/30/17  Visit # 11  Diagnosis: Right trigger thumb    HISTORY/OCCUPATIONAL PROFILE/ Medical and Therapy History:  Subjective:  Date of onset:  January 2017  Daily Comments: "I have been wearing my splints all the time; It has not popped   Pain:   On arrival to therapy:     0 out of 10   While working/ With Activity:  3 out of 10   On leaving therapy: 0 out of 10   Location of Pain:  [place] [facility]   Description of Pain:  Sharp and dull   Pain relived by: rest    Objective:  Treatment  MODALITIES:  Paraffin x12  CUS 3mhz x 8 mins @ .9w/cm2 to volar mp of right thumb to decrease inflammation and pain  THERAPEUTIC EXERCISES x 15 mins: to increase ROM, increase strength and increase functional use  -Thumb ROM ex e/f, Rab, Pab, Ad  -kinesiotaping x 2 i strips  MANUAL THERAPY:  -ADDED PROM to thumb mp and ip joint x 15  ORTHOTIC FABRICATION:  -custom mp ring orthotic fabricated & custom mp ring with cmc palm lip fabricated; splint instructions an precautions reviewed  -ice massage with instructions and issued 2 x jason cups  -reviewed adaptive equipment/ spring loaded scissors  THERAPEUTIC ACTIVITIES:  -adaptive equipment review  -adjusted orthotics x2; padding and extra volar lip portion for thumb ip extension      Assessment:   Medical necessity is demonstrated by the following IMPAIRMENTS/PROBLEMS:  Patient Lack of Knowledge/Awareness in Home Program  Increased Pain in Right  Decreased functional use/ unable to perform ADLs/iADLs  Increased Edema  Decreased ROM   Decreased  strength  Decreased pinch strength  Scar Adherent     Carmen presents to occupational therapy with an OT diagnosis of right " trigger thumb. She is compliant with orthoses.  Sphe states she has not been triggering.  The soft tissue thickened nodule at her volar thumb pulley is smaller but still pronounced.  We discussed continuing with therapy for another few weeks to see if she continues improving.   We continued with kinesiotaping x2 i stripsPatient would benefit from skilled OT to follow-up on orthotic modifications/adjustments.  We re-reviewed a detailed home program including gentle ROM, use of cryotherapy and splinting.   The patient requires skilled occupational therapy to address the problems identified above and to achieve the individual patient goals as outlined in the problems and goals section..      GOALS:  Short Therm Goals: (2 weeks)    STG: Patient will be independent in home exercise and self care program    STG : Symptomatic Improvements: Decreasing Pain: to 2/10 for increased activity tolerance    STG: Patient to be IND with HEP and modalities for pain management   Long Term Goals: (8-10 weeks)   LTG: Decrease edema in right wrist to WNLs for increased ability to hold a glass of water   LTG: Decrease complaints of pain to 0 out of 10 to increase tolerance for ADLs    LTG: Increase independence in the following ADLs/iADLs: use utensils to use a knife to prepare meal   LTG: Increase ROM to right = left in order to turn a doorknob   LTG: Increase functional use of right to  push a grocery cart for 10 minutes without pain   LTG: Increase  strength of right to open a tight jar   LTG: Increase lateral pinch strength when appropriate    Plan:   Graciela with established treatment plan

## 2017-06-07 ENCOUNTER — CLINICAL SUPPORT (OUTPATIENT)
Dept: REHABILITATION | Facility: HOSPITAL | Age: 57
End: 2017-06-07
Attending: ORTHOPAEDIC SURGERY
Payer: COMMERCIAL

## 2017-06-07 DIAGNOSIS — R29.898 DECREASED GRIP STRENGTH OF RIGHT HAND: ICD-10-CM

## 2017-06-07 DIAGNOSIS — R68.89 DECREASED FUNCTIONAL ACTIVITY TOLERANCE: ICD-10-CM

## 2017-06-07 DIAGNOSIS — M79.644 PAIN OF RIGHT THUMB: ICD-10-CM

## 2017-06-07 DIAGNOSIS — M25.649 DECREASED RANGE OF MOTION OF THUMB: Primary | ICD-10-CM

## 2017-06-07 PROCEDURE — 97110 THERAPEUTIC EXERCISES: CPT | Mod: PN

## 2017-06-07 PROCEDURE — 97140 MANUAL THERAPY 1/> REGIONS: CPT | Mod: PN

## 2017-06-07 PROCEDURE — 97018 PARAFFIN BATH THERAPY: CPT | Mod: PN

## 2017-06-07 PROCEDURE — 97035 APP MDLTY 1+ULTRASOUND EA 15: CPT | Mod: PN

## 2017-06-07 NOTE — PROGRESS NOTES
"Occupational Therapy Daily Note     Patient:  Carmen Hernandez  Date of Therapy Visit:  6/7/2017  Referring Physician:  Dr Tyrell Prather  Diagnosis: Right Thumb Trigger Finger  MRN : [id number]  Referral Orders:  Eval and treat     Start Time: 300pm  End Time:  400pm  Total Time:  60    Certification period from 4/14/17 to  5/30/17  Visit # 13  Diagnosis: Right trigger thumb    HISTORY/OCCUPATIONAL PROFILE/ Medical and Therapy History:  Subjective:  Date of onset:  January 2017  Daily Comments: "What is the surgery for this?"  Pain:   On arrival to therapy:     0 out of 10   While working/ With Activity:  3 out of 10   On leaving therapy: 0 out of 10   Location of Pain:  [facility]   Description of Pain:  Sharp and dull   Pain relived by: rest    Objective:  Treatment  MODALITIES:  Paraffin x12  CUS 3mhz x 8 mins @ .9w/cm2 to volar mp of right thumb to decrease inflammation and pain  THERAPEUTIC EXERCISES x 15 mins: to increase ROM, increase strength and increase functional use  -Thumb ROM ex e/f, Rab, Pab, Ad  -kinesiotaping x 2 i strips  MANUAL THERAPY:  -ADDED PROM to thumb mp joint x 15  ORTHOTIC FABRICATION:  -custom mp ring orthotic fabricated & custom mp ring with cmc palm lip fabricated; splint instructions an precautions reviewed  -ice massage with instructions and issued 2 x jason cups  -reviewed adaptive equipment/ spring loaded scissors  THERAPEUTIC ACTIVITIES:  -adaptive equipment review  -adjusted orthotics x2; padding and extra volar lip portion for thumb ip extension      Assessment:   Medical necessity is demonstrated by the following IMPAIRMENTS/PROBLEMS:  Patient Lack of Knowledge/Awareness in Home Program  Increased Pain in Right  Decreased functional use/ unable to perform ADLs/iADLs  Increased Edema  Decreased ROM   Decreased  strength  Decreased pinch strength  Scar Adherent     Carmen presents to occupational therapy with an OT diagnosis of right trigger thumb. She is compliant with " orthoses.  Carmen has not been triggering but she continues to have soft tissue thickened nodule at her volar thumb pulley.  We discussed doing one more therapy appointment with a trial of ip flexion while under supervision.  I reviewed with her what surgery for trigger thumb release would involve and encouraged her to follow up with Dr Prather for discussion.  We continued with kinesiotaping x2 i strips.  She was instructed to continue with ice massage and extension orthotic.  We re-reviewed a detailed home program including gentle ROM, use of cryotherapy and splinting.   The patient requires skilled occupational therapy to address the problems identified above and to achieve the individual patient goals as outlined in the problems and goals section..      GOALS:  Short Therm Goals: (2 weeks)    STG: Patient will be independent in home exercise and self care program    STG : Symptomatic Improvements: Decreasing Pain: to 2/10 for increased activity tolerance    STG: Patient to be IND with HEP and modalities for pain management   Long Term Goals: (8-10 weeks)   LTG: Decrease edema in right wrist to WNLs for increased ability to hold a glass of water   LTG: Decrease complaints of pain to 0 out of 10 to increase tolerance for ADLs    LTG: Increase independence in the following ADLs/iADLs: use utensils to use a knife to prepare meal   LTG: Increase ROM to right = left in order to turn a doorknob   LTG: Increase functional use of right to  push a grocery cart for 10 minutes without pain   LTG: Increase  strength of right to open a tight jar   LTG: Increase lateral pinch strength when appropriate    Plan:   Graciela with established treatment plan

## 2017-06-14 ENCOUNTER — CLINICAL SUPPORT (OUTPATIENT)
Dept: REHABILITATION | Facility: HOSPITAL | Age: 57
End: 2017-06-14
Attending: ORTHOPAEDIC SURGERY
Payer: COMMERCIAL

## 2017-06-14 DIAGNOSIS — R29.898 DECREASED GRIP STRENGTH OF RIGHT HAND: ICD-10-CM

## 2017-06-14 DIAGNOSIS — M79.644 PAIN OF RIGHT THUMB: Primary | ICD-10-CM

## 2017-06-14 DIAGNOSIS — M25.649 DECREASED RANGE OF MOTION OF THUMB: ICD-10-CM

## 2017-06-14 DIAGNOSIS — R68.89 DECREASED FUNCTIONAL ACTIVITY TOLERANCE: ICD-10-CM

## 2017-06-14 PROCEDURE — 97018 PARAFFIN BATH THERAPY: CPT | Mod: PN

## 2017-06-14 PROCEDURE — 97140 MANUAL THERAPY 1/> REGIONS: CPT | Mod: PN

## 2017-06-14 PROCEDURE — 97110 THERAPEUTIC EXERCISES: CPT | Mod: PN

## 2017-06-14 PROCEDURE — 97035 APP MDLTY 1+ULTRASOUND EA 15: CPT | Mod: PN

## 2017-06-14 NOTE — PROGRESS NOTES
"Occupational Therapy Daily Note     Patient:  Carmen Hernandez  Date of Therapy Visit:  6/14/2017  Referring Physician:  Dr Tyrell Prather  Diagnosis: Right Thumb Trigger Finger  MRN : [id number]  Referral Orders:  Eval and treat     Start Time: 300pm  End Time:  400pm  Total Time:  60    Visit # 14  Diagnosis: Right trigger thumb    HISTORY/OCCUPATIONAL PROFILE/ Medical and Therapy History:  Subjective:  Date of onset:  January 2017  Daily Comments: "I can't believe it clicked again today"  Pain:   On arrival to therapy:     0 out of 10   While working/ With Activity:  3 out of 10   On leaving therapy: 0 out of 10   Location of Pain:  [facility] [facility]   Description of Pain:  Sharp and dull   Pain relived by: rest    Objective:  Treatment  MODALITIES:  Paraffin x12  CUS 3mhz x 8 mins @ .9w/cm2 to volar mp of right thumb to decrease inflammation and pain  THERAPEUTIC EXERCISES x 15 mins: to increase ROM, increase strength and increase functional use  -Thumb ROM ex e/f, Rab, Pab, Ad  -kinesiotaping x 2 i strips  MANUAL THERAPY:  -ADDED PROM to thumb mp joint x 15  ORTHOTIC FABRICATION:  -custom mp ring orthotic fabricated & custom mp ring with cmc palm lip fabricated; splint instructions an precautions reviewed  -ice massage with instructions and issued 2 x jason cups  -reviewed adaptive equipment/ spring loaded scissors  THERAPEUTIC ACTIVITIES:  -adaptive equipment review  -adjusted orthotics x2; padding and extra volar lip portion for thumb ip extension      Assessment:   Medical necessity is demonstrated by the following IMPAIRMENTS/PROBLEMS:  Patient Lack of Knowledge/Awareness in Home Program  Increased Pain in Right  Decreased functional use/ unable to perform ADLs/iADLs  Increased Edema  Decreased ROM   Decreased  strength  Decreased pinch strength  Scar Adherent     Carmen presents to occupational therapy with an OT diagnosis of right trigger thumb. She is compliant with orthoses.  She continues to have the " soft tissue nodule in her volar thumb.  With a very gentle trial of ROM of the thumb ip today, she did trigger. She does agree that she needs to have the surgery and will call the MD to make an appointment.    I reviewed with her what surgery for trigger thumb release would involve and encouraged her to follow up with Dr Prather for discussion.  We continued with kinesiotaping x2 i strips.  She was instructed to continue with ice massage and extension orthotic.  We re-reviewed a detailed home program including gentle ROM, use of cryotherapy and splinting.   The patient requires skilled occupational therapy to address the problems identified above and to achieve the individual patient goals as outlined in the problems and goals section..      GOALS:  Short Therm Goals: (2 weeks)    STG: Patient will be independent in home exercise and self care program    STG : Symptomatic Improvements: Decreasing Pain: to 2/10 for increased activity tolerance    STG: Patient to be IND with HEP and modalities for pain management   Long Term Goals: (8-10 weeks)   LTG: Decrease edema in right wrist to WNLs for increased ability to hold a glass of water   LTG: Decrease complaints of pain to 0 out of 10 to increase tolerance for ADLs    LTG: Increase independence in the following ADLs/iADLs: use utensils to use a knife to prepare meal   LTG: Increase ROM to right = left in order to turn a doorknob   LTG: Increase functional use of right to  push a grocery cart for 10 minutes without pain   LTG: Increase  strength of right to open a tight jar   LTG: Increase lateral pinch strength when appropriate    Plan:   Graciela with established treatment plan

## 2017-06-22 ENCOUNTER — TELEPHONE (OUTPATIENT)
Dept: INTERNAL MEDICINE | Facility: CLINIC | Age: 57
End: 2017-06-22

## 2017-06-22 RX ORDER — ALPRAZOLAM 0.5 MG/1
TABLET ORAL
Qty: 100 TABLET | Refills: 0 | Status: SHIPPED | OUTPATIENT
Start: 2017-06-22 | End: 2017-08-09 | Stop reason: SDUPTHER

## 2017-06-22 NOTE — TELEPHONE ENCOUNTER
----- Message from Jordyn Vaughn sent at 6/22/2017 12:34 PM CDT -----  Contact: self [phone]  Type: Rx    Name of medication(s): alprazolam (XANAX) 0.5 MG tablet    Is this a refill? New rx? Refill     Who prescribed medication?    Pharmacy Name, Phone, & Location: [facility]  on file     Comments: please advise, Thanks !

## 2017-07-05 RX ORDER — LISINOPRIL 40 MG/1
TABLET ORAL
Qty: 90 TABLET | Refills: 0 | Status: SHIPPED | OUTPATIENT
Start: 2017-07-05 | End: 2017-10-04 | Stop reason: SDUPTHER

## 2017-07-06 ENCOUNTER — OFFICE VISIT (OUTPATIENT)
Dept: OTOLARYNGOLOGY | Facility: CLINIC | Age: 57
End: 2017-07-06
Payer: COMMERCIAL

## 2017-07-06 VITALS
SYSTOLIC BLOOD PRESSURE: 131 MMHG | HEART RATE: 90 BPM | WEIGHT: 229.5 LBS | HEIGHT: 64 IN | BODY MASS INDEX: 39.18 KG/M2 | DIASTOLIC BLOOD PRESSURE: 74 MMHG

## 2017-07-06 DIAGNOSIS — E66.01 MORBID OBESITY, UNSPECIFIED OBESITY TYPE: ICD-10-CM

## 2017-07-06 DIAGNOSIS — R73.09 ELEVATED HEMOGLOBIN A1C: ICD-10-CM

## 2017-07-06 DIAGNOSIS — R07.0 THROAT DISCOMFORT: Primary | ICD-10-CM

## 2017-07-06 DIAGNOSIS — R49.0 DYSPHONIA: ICD-10-CM

## 2017-07-06 DIAGNOSIS — K21.9 LPRD (LARYNGOPHARYNGEAL REFLUX DISEASE): ICD-10-CM

## 2017-07-06 PROCEDURE — 99203 OFFICE O/P NEW LOW 30 MIN: CPT | Mod: 25,S$GLB,, | Performed by: NURSE PRACTITIONER

## 2017-07-06 PROCEDURE — 99999 PR PBB SHADOW E&M-EST. PATIENT-LVL IV: CPT | Mod: PBBFAC,,, | Performed by: NURSE PRACTITIONER

## 2017-07-06 PROCEDURE — 31575 DIAGNOSTIC LARYNGOSCOPY: CPT | Mod: S$GLB,,, | Performed by: NURSE PRACTITIONER

## 2017-07-06 NOTE — PROGRESS NOTES
Subjective:       Patient ID: Carmen Hernandez is a 57 y.o. female.    Chief Complaint: No chief complaint on file.    HPI   Patient reports intermittent hoarseness X few months. She has to speak loud for her , and finds that her voice worsens with excessive loud talking. She also reports intermittent mild throat discomfort. She also reports dry mouth and dry eyes. Nasal membranes and vulva not involved. Hemoglobin A1C is 8.1.     Review of Systems   Constitutional: Negative.    HENT: Positive for sore throat and voice change.         Dry mouth   Eyes: Negative.         Dry eyes   Respiratory: Negative.    Cardiovascular: Negative.    Gastrointestinal: Negative.    Musculoskeletal: Negative.    Skin: Negative.    Neurological: Negative.    Hematological: Negative.    Psychiatric/Behavioral: Negative.        Objective:      Physical Exam   Constitutional: She is oriented to person, place, and time. Vital signs are normal. She appears well-developed and well-nourished. She is cooperative. She does not appear ill. No distress.   HENT:   Head: Normocephalic and atraumatic.   Right Ear: Hearing, tympanic membrane, external ear and ear canal normal. Tympanic membrane is not erythematous. No middle ear effusion.   Left Ear: Hearing, tympanic membrane, external ear and ear canal normal. Tympanic membrane is not erythematous.  No middle ear effusion.   Nose: Nose normal. No mucosal edema or rhinorrhea. Right sinus exhibits no maxillary sinus tenderness and no frontal sinus tenderness. Left sinus exhibits no maxillary sinus tenderness and no frontal sinus tenderness.   Mouth/Throat: Uvula is midline, oropharynx is clear and moist and mucous membranes are normal. Mucous membranes are not pale, not dry and not cyanotic. No oral lesions. No oropharyngeal exudate, posterior oropharyngeal edema or posterior oropharyngeal erythema.   Eyes: Conjunctivae, EOM and lids are normal. Pupils are equal, round, and reactive to  light. Right eye exhibits no discharge. Left eye exhibits no discharge. No scleral icterus.   Neck: Trachea normal and normal range of motion. Neck supple. No tracheal deviation present. No thyroid mass and no thyromegaly present.   Cardiovascular: Normal rate.    Pulmonary/Chest: Effort normal. No stridor. No respiratory distress. She has no wheezes.   Musculoskeletal: Normal range of motion.   Lymphadenopathy:        Head (right side): No submental, no submandibular, no tonsillar, no preauricular and no posterior auricular adenopathy present.        Head (left side): No submental, no submandibular, no tonsillar, no preauricular and no posterior auricular adenopathy present.     She has no cervical adenopathy.        Right cervical: No superficial cervical and no posterior cervical adenopathy present.       Left cervical: No superficial cervical and no posterior cervical adenopathy present.   Neurological: She is alert and oriented to person, place, and time. She has normal strength. Coordination and gait normal.   Skin: Skin is warm, dry and intact. No lesion and no rash noted. She is not diaphoretic. No cyanosis. No pallor.   Psychiatric: She has a normal mood and affect. Her speech is normal and behavior is normal. Judgment and thought content normal. Cognition and memory are normal.   Nursing note and vitals reviewed.      Procedure: Flexible laryngoscopy    In order to fully examine the upper aerodigestive tract, including the larynx, in a patient with a hyperactive gag reflex, and suboptimal visualization with indirect mirror exam,  flexible endoscopy is required.   After explaining the procedure and obtaining verbal consent, a timeout was performed with the patient's participation according to the universal protocol. Both nasal cavities were anesthetized with 4% Xylocaine spray mixed with Vaughn-Synephrine. The flexible laryngoscope  was inserted into the nasal cavity and advanced to visualize the nasal cavity,  nasopharynx, the posterior oropharynx, hypopharynx, and the endolarynx with the  findings noted. The scope was removed and the procedure terminated. The patient tolerated this procedure well without apparent complication.     OVERALL FINDINGS  Nasopharynx - the torus is clear. There are no lesions of the posterior wall.   Oropharynx - no lesions of the tongue base. There is no obvious fullness or asymmetry.  Hypopharynx - there are no lesions of the pyriform sinuses or postcricoid region   Larynx - there are no lesions of the supraglottic or glottic larynx.  Vocal fold mobility is normal.     SPECIFIC FINDINGS  Adenoid tissue - normal   Nasopharynx & eustachian tube orifices - normal   Posterior pharyngeal wall - normal   Base of tongue - normal   Epiglottis - normal   Valleculae - normal   Pyriform sinuses - normal   False vocal cords - normal   True vocal cords - normal  Arytenoids - normal   Interarytenoid space - marked edema and erythema   Larynx     Larynx    Assessment:     LPRD manifested as recurrent throat discomfort and dysphonia      Plan:     Laryngoscope photos given and reviewed in detail with patient. Recommend omeprazole QAM on an empty stomach, ranitidine QHS, and follow up with GI for refractory symptoms and continued management. Handouts given on LPRD and GERD, and discussed at length with patient.     If voice concerns persist, offered laryngologist Dr. Dawson

## 2017-07-06 NOTE — PATIENT INSTRUCTIONS
"  How Acid Reflux Affects Your Throat    Do you have to clear your throat or cough often? Are you hoarse? Do you have trouble swallowing? If you have these or other throat symptoms, you may have acid reflux. This occurs when stomach acid flows back up and irritates your throat.  Why you have throat symptoms  There are muscles (esophageal sphincters) at both ends of the tube that carries food to your stomach (the esophagus). These muscles relax to let food pass. Then they tighten to keep stomach acid down. When the lower esophageal sphincter (LES) doesnt tighten enough, acid can flow back (reflux) from your stomach into your esophagus. This may cause heartburn. In some cases the upper esophageal sphincter (UES) also doesnt work well. Then acid can travel higher and enter your throat (pharynx). In many cases, this causes throat symptoms.  Common throat symptoms  · Need to clear your throat often  · Feeling like youre choking  · Long-term (chronic) cough  · Hoarseness  · Trouble swallowing  · Feel like you have a lump in your throat  · Sour or acid taste  · Sore throat that keeps coming back     LARYNGOPHARYNGEAL REFLUX  (SILENT OR ATYPICAL REFLUX)    If you have any of the following symptoms you may have laryngopharyngeal reflux (LPR):  hoarseness, thick or too much mucus, chronic throat pain/irritation, chronic throat clearing, chronic cough, especially cough that wake you up from sleep, chronic "postnasal drip" without the need to blow your nose.     Many people with LPR do not have symptoms of heartburn. Compared to the esophagus, the voice box and the back of the throat are significantly more sensitive to the effects of acid on surrounding tissue. Acid passing quickly through the esophagus does not have a chance to irritate the area for too long.  However acid that pools in the throat or voice box can cause prolonged irritation resulting in the symptoms of LPR.    The symptoms of LPR can consist of a dry cough " "and the sensation of something being stuck in the throat.  Some people will complain of heartburn while others may have intermittent hoarseness or loss of voice.  Another major symptom of LPR is "postnasal drip."  Patients are often told symptoms are due to abnormal nasal drainage or sinus infection; however this is rarely the cause of chronic throat irritation. For post nasal drip to cause the complaints described, signs and symptoms of an active nasal infection should be present.     Treatments for LPR include:  postural changes, weight reduction, diet modification, medication to reduce stomach acid and promote normal motility, and surgery to prevent reflux. Most patients will begin to notice some relief in her symptoms about 2 weeks after starting the medication; however it is generally recommended the medication should be continued for 2 months. If the symptoms completely resolve, the medication can then be tapered.  Some people will remain symptom free while others may have relapses which required treatment again.    Things you can do to prevent reflux include:  Do not smoke.  Smoking will cause reflux.  Avoid tight fitting clothes or belts around the waist.  Avoid eating at least 2 hours prior to bedtime.  In fact avoid eating your largest meal at night.  Weight loss.  For patient's with recent weight gain, shedding a few pounds is all that is required to improve reflux.  Avoid caffeine, cola beverages, citrus beverages, mints, alcoholic beverages, particularly at night, cheese, fried foods, spicy foods, eggs, and chocolate.  Sleep with the head of bed elevated at least 6 inches.    Recommendations:  Take Nexium / Prilosec (PPI) every morning on an empty stomach (30-60 minutes before eating) 20-40 MG. At bedtime take Zantac (H2-blocker) 150-300 mg.  All are available over-the-counter without a prescription.   See a Gastro doctor (GI) for refractory symptoms and continued management.    If voice concerns " persist, see Dr. Jeffrey Marino, [facility] laryngologist at our main campus on [facility] in [place] [phone].

## 2017-08-09 RX ORDER — ALPRAZOLAM 0.5 MG/1
TABLET ORAL
Qty: 100 TABLET | Refills: 0 | Status: SHIPPED | OUTPATIENT
Start: 2017-08-09 | End: 2017-09-08 | Stop reason: SDUPTHER

## 2017-08-11 ENCOUNTER — OFFICE VISIT (OUTPATIENT)
Dept: OPTOMETRY | Facility: CLINIC | Age: 57
End: 2017-08-11
Payer: COMMERCIAL

## 2017-08-11 DIAGNOSIS — H10.829 ROSACEA BLEPHAROCONJUNCTIVITIS: Primary | ICD-10-CM

## 2017-08-11 PROCEDURE — 99999 PR PBB SHADOW E&M-EST. PATIENT-LVL III: CPT | Mod: PBBFAC,,, | Performed by: OPTOMETRIST

## 2017-08-11 PROCEDURE — 92012 INTRM OPH EXAM EST PATIENT: CPT | Mod: S$GLB,,, | Performed by: OPTOMETRIST

## 2017-08-11 RX ORDER — NEOMYCIN SULFATE, POLYMYXIN B SULFATE, AND DEXAMETHASONE 3.5; 10000; 1 MG/G; [USP'U]/G; MG/G
OINTMENT OPHTHALMIC
Qty: 3.5 G | Refills: 1 | Status: SHIPPED | OUTPATIENT
Start: 2017-08-11 | End: 2017-09-22

## 2017-08-11 RX ORDER — DOXYCYCLINE 50 MG/1
50 CAPSULE ORAL DAILY
Qty: 30 CAPSULE | Refills: 0 | Status: SHIPPED | OUTPATIENT
Start: 2017-08-11 | End: 2019-07-11 | Stop reason: SDUPTHER

## 2017-08-11 NOTE — Clinical Note
Call next week to check status, will need to schedule IOP / DFE in 2-3 weeks. Of course can use [facility].

## 2017-08-11 NOTE — PROGRESS NOTES
HPI     Eye Problem    Additional comments: poss eye infection --- mucousy discharge OD --- pt   states longstanding discharge due to cyst in RUL            Dry Eye    Additional comments: +ATS OU qid           Blurred Vision    Additional comments: may be due to dryness       Last edited by Jacklyn Saldaña on 8/11/2017  3:21 PM. (History)        ROS     Positive for: Eyes    Negative for: Constitutional, Gastrointestinal, Neurological, Skin,   Genitourinary, Musculoskeletal, HENT, Endocrine, Cardiovascular,   Respiratory, Psychiatric, Allergic/Imm, Heme/Lymph    Last edited by DESIREE Jackson, OD on 8/11/2017  3:31 PM. (History)        Assessment /Plan     For exam results, see Encounter Report.    Rosacea blepharoconjunctivitis

## 2017-08-11 NOTE — PROGRESS NOTES
HPI     Eye Problem    Additional comments: poss eye infection --- mucousy discharge OD --- pt   states longstanding discharge due to cyst in RUL            Dry Eye    Additional comments: +ATS OU qid           Blurred Vision    Additional comments: may be due to dryness       Last edited by Jacklyn Saldaña on 8/11/2017  3:21 PM. (History)        ROS     Positive for: Eyes    Negative for: Constitutional, Gastrointestinal, Neurological, Skin,   Genitourinary, Musculoskeletal, HENT, Endocrine, Cardiovascular,   Respiratory, Psychiatric, Allergic/Imm, Heme/Lymph    Last edited by DESIREE Jackson, OD on 8/11/2017  3:40 PM. (History)        Assessment /Plan     For exam results, see Encounter Report.    Rosacea blepharoconjunctivitis  -     neomycin-polymyxin-dexamethasone (MAXITROL) 3.5 mg/g-10,000 unit/g-0.1 % Oint; Apply sparingly to lid margins OU bid x 7 days then qhs x 7 days, then prn to control blepharitis  Dispense: 3.5 g; Refill: 1  -     doxycycline (MONODOX) 50 MG Cap; Take 1 capsule (50 mg total) by mouth once daily.  Dispense: 30 capsule; Refill: 0      Chronic issues  Lid hygiene scrubs qhs to continue (pt reports good compliance daily  Add ATs tid-qid+ enzo with near work  Rx gtts tapering over 2 weeks  Oral doxy, 30 day trial  Knows to call if any issues with meds  Send report in 7-10 days through pt portal---will need to schedule IOP/ DFE

## 2017-08-15 PROBLEM — K21.9 LARYNGOPHARYNGEAL REFLUX (LPR): Status: ACTIVE | Noted: 2017-08-15

## 2017-08-29 ENCOUNTER — TELEPHONE (OUTPATIENT)
Dept: OPTOMETRY | Facility: CLINIC | Age: 57
End: 2017-08-29

## 2017-08-29 NOTE — TELEPHONE ENCOUNTER
----- Message from DESIREE Jackson OD sent at 8/11/2017  5:10 PM CDT -----  Call next week to check status, will need to schedule IOP / DFE in 2-3 weeks. Of course can use [facility].

## 2017-09-08 RX ORDER — ALPRAZOLAM 0.5 MG/1
TABLET ORAL
Qty: 100 TABLET | Refills: 0 | Status: SHIPPED | OUTPATIENT
Start: 2017-09-08 | End: 2017-10-04 | Stop reason: SDUPTHER

## 2017-09-15 ENCOUNTER — OFFICE VISIT (OUTPATIENT)
Dept: OPTOMETRY | Facility: CLINIC | Age: 57
End: 2017-09-15
Payer: COMMERCIAL

## 2017-09-15 DIAGNOSIS — H10.829 ROSACEA BLEPHAROCONJUNCTIVITIS: ICD-10-CM

## 2017-09-15 DIAGNOSIS — H52.13 MYOPIA WITH ASTIGMATISM AND PRESBYOPIA, BILATERAL: ICD-10-CM

## 2017-09-15 DIAGNOSIS — Z13.5 GLAUCOMA SCREENING: ICD-10-CM

## 2017-09-15 DIAGNOSIS — E11.9 DIABETES MELLITUS TYPE 2 WITHOUT RETINOPATHY: Primary | ICD-10-CM

## 2017-09-15 DIAGNOSIS — H25.13 NUCLEAR SCLEROSIS, BILATERAL: ICD-10-CM

## 2017-09-15 DIAGNOSIS — D31.41 IRIS NEVUS, RIGHT: ICD-10-CM

## 2017-09-15 DIAGNOSIS — H52.4 MYOPIA WITH ASTIGMATISM AND PRESBYOPIA, BILATERAL: ICD-10-CM

## 2017-09-15 DIAGNOSIS — H43.811 POSTERIOR VITREOUS DETACHMENT, RIGHT EYE: ICD-10-CM

## 2017-09-15 DIAGNOSIS — H52.203 MYOPIA WITH ASTIGMATISM AND PRESBYOPIA, BILATERAL: ICD-10-CM

## 2017-09-15 PROCEDURE — 92014 COMPRE OPH EXAM EST PT 1/>: CPT | Mod: S$GLB,,, | Performed by: OPTOMETRIST

## 2017-09-15 PROCEDURE — 99999 PR PBB SHADOW E&M-EST. PATIENT-LVL III: CPT | Mod: PBBFAC,,, | Performed by: OPTOMETRIST

## 2017-09-15 NOTE — PROGRESS NOTES
HPI     Concerns About Ocular Health    Additional comments: IOP / DFE           Eye Problem    Additional comments: f/u bleph - OU // symptoms improved w/ Maxitrol brenna   & ATS tid       Last edited by Jacklyn Saldaña on 9/15/2017  1:25 PM. (History)        ROS     Positive for: Eyes    Negative for: Constitutional, Gastrointestinal, Neurological, Skin,   Genitourinary, Musculoskeletal, HENT, Endocrine, Cardiovascular,   Respiratory, Psychiatric, Allergic/Imm, Heme/Lymph    Last edited by DESIREE Jackson, OD on 9/15/2017  1:49 PM. (History)        Assessment /Plan     For exam results, see Encounter Report.    Diabetes mellitus type 2 without retinopathy    Posterior vitreous detachment, right eye    Nuclear sclerosis, bilateral    Glaucoma screening    Rosacea blepharoconjunctivitis    Iris nevus, right    Myopia with astigmatism and presbyopia, bilateral      1. No ret/ no csme, gave info, control glucose, annual DFE  2. RD precautions given  3. Early changes, not vis sig   4. Not suspect  Residual spk following IOP check today---use ATs q2-3h while awake, call/ message if issues    5. Longstanding h/o lid dx, improved with recent hygiene increase and ATs  6. Stable OD  7. Updated specs rx gave copy    Discussed and educated patient on current findings /plan.  RTC 1 year, prn if any changes / issues

## 2017-09-15 NOTE — PATIENT INSTRUCTIONS
DIABETES AND THE EYE / DIABETIC RETINOPATHY    Diabetic retinopathy is a condition occurring in persons with diabetes, which causes progressive damage to the retina, the light sensitive lining at the back of the eye. It is a serious sight-threatening complication of diabetes.    Diabetic retinopathy is the result of damage to the tiny blood vessels that nourish the retina. They leak blood and other fluids that cause swelling of retinal tissue and clouding of vision. The condition usually affects both eyes. The longer a person has diabetes, the more likely they will develop diabetic retinopathy. If left untreated, diabetic retinopathy can cause blindness.  There are two basic types of diabetic retinopathy:    Background or nonproliferative diabetic retinopathy (NPDR)  Nonproliferative diabetic retinopathy (NPDR) is the earliest stage of diabetic retinopathy. With this condition, damaged blood vessels in the retina begin to leak extra fluid and small amounts of blood into the eye. Sometimes, deposits of cholesterol or other fats from the blood may leak into the retina. Many people with diabetes have mild NPDR, which usually does not affect their vision. However, if their vision is affected, it is the result of macular edema and macular ischemia.    If vision is affected due to macular changes, a consult with a Retina Specialist may be advised.  This is an ophthalmologist that treats retina conditions, including diabetic retinopathy.     Proliferative diabetic retinopathy (PDR)  Proliferative diabetic retinopathy (PDR) mainly occurs when many of the blood vessels in the retina close, preventing enough blood flow. In an attempt to supply blood to the area where the original vessels closed, the retina responds by growing new blood vessels. This is called neovascularization. However, these new blood vessels are abnormal and do not supply the retina with proper blood flow. The new vessels are also often accompanied by scar  "tissue that may cause the retina to wrinkle or detach. PDR may cause more severe vision loss than NPDR because it can affect both central and peripheral vision.     A patient diagnosed with proliferative diabetic eye disease will be referred to a retinal specialist for consultation.    Often there are no visual symptoms in the early stages of diabetic retinopathy. That is why our eye care professionals recommend that everyone with diabetes have a comprehensive dilated eye examination once a year. Early detection and treatment can limit the potential for significant vision loss from diabetic retinopathy.        DRY EYES:  Use Over The Counter artificial tears as needed for dry eye symptoms.  Some common brands include:  Systane, Optive, and Refresh.  These drops can be used as frequently as desired, but may be most helpful use during long periods of concentrated work.  For example, reading / working at the computer.  Avoid drops that "get redness out", as these contain medication that may further irritate the eyes.    ALLERGY EYES / SYMPTOMS:    Over the counter medications include--Zaditor and Alaway  Use as directed 1-2 drops daily for symptoms of itching / watering eyes.  These drops will not help for dry eye or exposure symptoms.    FLASHES / FLOATERS / POSTERIOR VITREOUS DETACHMENT    Call the clinic if you have any further changes in symptoms.  Including:  Increased numbers of floaters or flashing lights, dimness or darkness that moves through or stays constant in your vision, or any pain in the eye (s).              "

## 2017-09-19 ENCOUNTER — TELEPHONE (OUTPATIENT)
Dept: GASTROENTEROLOGY | Facility: CLINIC | Age: 57
End: 2017-09-19

## 2017-09-19 NOTE — TELEPHONE ENCOUNTER
----- Message from Maggie Falcon sent at 9/19/2017 12:12 PM CDT -----  Patient states she wants to come in at 2:15 please call [phone] (home)

## 2017-09-19 NOTE — TELEPHONE ENCOUNTER
Discussed with pt again  If she is having reflux issues she needs an EGD  Pt is an established pt with Dr Santos.

## 2017-09-25 NOTE — H&P
History & Physical - Short Stay  Gastroenterology      SUBJECTIVE:     Procedure: Gastroscopy    Chief Complaint/Indication for Procedure:  Sore throat.  Suspected LPR/GERD    History of Present Illness:  Office Visit     7/6/2017  [facility]      Isadora Toussaint NP   Otolaryngology   Throat discomfort +4 more   Dx   Sore Throat, Hoarse; Referred by Unknown Referring   Reason for Visit    Progress Notes        Subjective:       Patient ID: Carmen Hernandez is a 57 y.o. female.     Chief Complaint: No chief complaint on file.     HPI   Patient reports intermittent hoarseness X few months. She has to speak loud for her , and finds that her voice worsens with excessive loud talking. She also reports intermittent mild throat discomfort. She also reports dry mouth and dry eyes. Nasal membranes and vulva not involved. Hemoglobin A1C is 8.1.     Procedure: Flexible laryngoscopy    In order to fully examine the upper aerodigestive tract, including the larynx, in a patient with a hyperactive gag reflex, and suboptimal visualization with indirect mirror exam,  flexible endoscopy is required.   After explaining the procedure and obtaining verbal consent, a timeout was performed with the patient's participation according to the universal protocol. Both nasal cavities were anesthetized with 4% Xylocaine spray mixed with Vaughn-Synephrine. The flexible laryngoscope  was inserted into the nasal cavity and advanced to visualize the nasal cavity, nasopharynx, the posterior oropharynx, hypopharynx, and the endolarynx with the  findings noted. The scope was removed and the procedure terminated. The patient tolerated this procedure well without apparent complication.     OVERALL FINDINGS  Nasopharynx - the torus is clear. There are no lesions of the posterior wall.   Oropharynx - no lesions of the tongue base. There is no obvious fullness or asymmetry.  Hypopharynx - there are no lesions of the pyriform sinuses or  postcricoid region   Larynx - there are no lesions of the supraglottic or glottic larynx.  Vocal fold mobility is normal.    SPECIFIC FINDINGS  Adenoid tissue - normal   Nasopharynx & eustachian tube orifices - normal   Posterior pharyngeal wall - normal   Base of tongue - normal   Epiglottis - normal   Valleculae - normal   Pyriform sinuses - normal   False vocal cords - normal   True vocal cords - normal  Arytenoids - normal   Interarytenoid space - marked edema and erythema    Assessment:     LPRD manifested as recurrent throat discomfort and dysphonia      Plan:     Laryngoscope photos given and reviewed in detail with patient. Recommend omeprazole QAM on an empty stomach, ranitidine QHS, and follow up with GI for refractory symptoms and continued management. Handouts given on LPRD and GERD, and discussed at length with patient.     If voice concerns persist, offered laryngologist Dr. Samir VALDEZ Medications   Medication Sig    alprazolam (XANAX) 0.5 MG tablet TAKE 1 TABLET BY MOUTH FOUR TIMES DAILY AS NEEDED    lisinopril (PRINIVIL,ZESTRIL) 40 MG tablet TAKE 1 TABLET BY MOUTH EVERY DAY    metformin (GLUCOPHAGE) 500 MG tablet TAKE 2 TABLETS BY MOUTH TWICE DAILY    pravastatin (PRAVACHOL) 40 MG tablet TAKE 1 TABLET BY MOUTH EVERY DAY    alcohol swabs (BD ALCOHOL SWABS) PadM USE TO CHECK BLOOD GLUCOSE TWICE DAILY    ONETOUCH DELICA LANCETS 33 gauge Misc USE TO TEST BLOOD GLUCOSE TWICE DAILY    ONETOUCH VERIO Strp USE TO TEST AS DIRECTED    ONETOUCH VERIO SYNC kit USE TO TEST BLOOD GLUOCSE TWICE DAILY       Review of patient's allergies indicates:   Allergen Reactions    Demerol [meperidine] Hives    Iodinated contrast- oral and iv dye Hives    Bactrim [sulfamethoxazole-trimethoprim] Other (See Comments)     Other reaction(s): blotchy skin    Ciprofloxacin Other (See Comments)     Other reaction(s): blotchy skin    Erythromycin Other (See Comments)     tachycardia        Past Medical  "History:   Diagnosis Date    Anxiety     Bilateral dry eyes     uses atears//    Cataract     Diabetes mellitus     LBSL 128 today---fluctuates    Hyperlipidemia     Hypertension     Nephrolithiasis      Past Surgical History:   Procedure Laterality Date    COLONOSCOPY  5/13/2009  [facility]    One 4 mm polyp in the transverse colon.  HYPERPLASTIC POLYP.    Diverticulosis sigmoid colon.    Tortuous colon.   Granularity hepatic flexure.     COLONOSCOPY  05/16/2013    [facility].   Diverticulosis in the sigmoid colon.  Tortuous colon.  Irritable bowel syndrome?.  Internal hemorrhoids.  Redundant colon.    CYSTOSCOPY W/ STONE MANIPULATION      ENDOMETRIAL BIOPSY  X2    HYSTERECTOMY      OOPHORECTOMY       Family History   Problem Relation Age of Onset    Cataracts Mother     Heart disease Mother     Hypertension Mother     Diabetes Father     Hypertension Father     Diabetes Brother     Hypertension Brother     Macular degeneration Neg Hx     Retinal detachment Neg Hx     Strabismus Neg Hx     Stroke Neg Hx     Thyroid disease Neg Hx     Glaucoma Neg Hx     Cancer Neg Hx     Amblyopia Neg Hx     Blindness Neg Hx     Breast cancer Neg Hx      Social History   Substance Use Topics    Smoking status: Former Smoker     Quit date: 8/12/2013    Smokeless tobacco: Never Used    Alcohol use No         OBJECTIVE:     Vital Signs (Most Recent)  Temp: 97.5 °F (36.4 °C) (09/26/17 1248)  Pulse: 84 (09/26/17 1248)  Resp: 16 (09/26/17 1248)  BP: (!) 141/65 (09/26/17 1248)  SpO2: (!) 16 % (09/26/17 1248)    Physical Exam:           :  Ht 5' 4" (1.626 m)    Wt 104.1 kg (229 lb 8 oz)    BMI 39.39 kg/m²                         GENERAL:  Comfortable, in no acute distress.                                 HEENT EXAM:  Nonicteric.  No adenopathy.  Oropharynx is clear.               NECK:  Supple.                                                               LUNGS:  Clear.                                              "                  CARDIAC:  Regular rate and rhythm.  S1, S2.  No murmur.                      ABDOMEN:  Obese.  Soft, positive bowel sounds, nontender.  No hepatosplenomegaly or masses.  No rebound or guarding.                                  EXTREMITIES:  No edema.     MENTAL STATUS:  Alert and oriented.    ASSESSMENT/PLAN:     Assessment: Sore throat.  Suspected LPR/GERD.    Plan: Gastroscopy    Anesthesia Plan:   MAC / General Anaesthesia    ASA Grade: ASA 2 - Patient with mild systemic disease with no functional limitations    MALLAMPATI SCORE: II (hard and soft palate, upper portion of tonsils anduvula visible)

## 2017-09-26 ENCOUNTER — SURGERY (OUTPATIENT)
Age: 57
End: 2017-09-26

## 2017-09-26 ENCOUNTER — ANESTHESIA (OUTPATIENT)
Dept: ENDOSCOPY | Facility: HOSPITAL | Age: 57
End: 2017-09-26
Payer: COMMERCIAL

## 2017-09-26 ENCOUNTER — ANESTHESIA EVENT (OUTPATIENT)
Dept: ENDOSCOPY | Facility: HOSPITAL | Age: 57
End: 2017-09-26
Payer: COMMERCIAL

## 2017-09-26 ENCOUNTER — HOSPITAL ENCOUNTER (OUTPATIENT)
Facility: HOSPITAL | Age: 57
Discharge: HOME | End: 2017-09-26
Attending: INTERNAL MEDICINE | Admitting: INTERNAL MEDICINE
Payer: COMMERCIAL

## 2017-09-26 VITALS
WEIGHT: 227 LBS | HEIGHT: 64 IN | SYSTOLIC BLOOD PRESSURE: 165 MMHG | DIASTOLIC BLOOD PRESSURE: 77 MMHG | TEMPERATURE: 98 F | OXYGEN SATURATION: 99 % | HEART RATE: 82 BPM | RESPIRATION RATE: 16 BRPM | BODY MASS INDEX: 38.76 KG/M2

## 2017-09-26 VITALS — RESPIRATION RATE: 47 BRPM

## 2017-09-26 DIAGNOSIS — K21.9 GERD (GASTROESOPHAGEAL REFLUX DISEASE): ICD-10-CM

## 2017-09-26 LAB
GLUCOSE SERPL-MCNC: 188 MG/DL (ref 70–110)
H PYLORI INDEX VALUE: NEGATIVE

## 2017-09-26 PROCEDURE — D9220A PRA ANESTHESIA: Mod: CRNA,,, | Performed by: NURSE ANESTHETIST, CERTIFIED REGISTERED

## 2017-09-26 PROCEDURE — 43239 EGD BIOPSY SINGLE/MULTIPLE: CPT | Mod: PO | Performed by: INTERNAL MEDICINE

## 2017-09-26 PROCEDURE — 88305 TISSUE EXAM BY PATHOLOGIST: CPT | Performed by: PATHOLOGY

## 2017-09-26 PROCEDURE — 25000003 PHARM REV CODE 250: Mod: PO | Performed by: INTERNAL MEDICINE

## 2017-09-26 PROCEDURE — D9220A PRA ANESTHESIA: Mod: ANES,,, | Performed by: ANESTHESIOLOGY

## 2017-09-26 PROCEDURE — 37000009 HC ANESTHESIA EA ADD 15 MINS: Mod: PO | Performed by: INTERNAL MEDICINE

## 2017-09-26 PROCEDURE — 87449 NOS EACH ORGANISM AG IA: CPT | Mod: PO | Performed by: INTERNAL MEDICINE

## 2017-09-26 PROCEDURE — 43239 EGD BIOPSY SINGLE/MULTIPLE: CPT | Mod: ,,, | Performed by: INTERNAL MEDICINE

## 2017-09-26 PROCEDURE — 37000008 HC ANESTHESIA 1ST 15 MINUTES: Mod: PO | Performed by: INTERNAL MEDICINE

## 2017-09-26 PROCEDURE — 27201012 HC FORCEPS, HOT/COLD, DISP: Mod: PO | Performed by: INTERNAL MEDICINE

## 2017-09-26 PROCEDURE — 88305 TISSUE EXAM BY PATHOLOGIST: CPT | Mod: 26,,, | Performed by: PATHOLOGY

## 2017-09-26 PROCEDURE — 63600175 PHARM REV CODE 636 W HCPCS: Mod: PO | Performed by: NURSE ANESTHETIST, CERTIFIED REGISTERED

## 2017-09-26 RX ORDER — LIDOCAINE HCL/PF 100 MG/5ML
SYRINGE (ML) INTRAVENOUS
Status: DISCONTINUED | OUTPATIENT
Start: 2017-09-26 | End: 2017-09-26

## 2017-09-26 RX ORDER — PROPOFOL 10 MG/ML
VIAL (ML) INTRAVENOUS
Status: DISCONTINUED | OUTPATIENT
Start: 2017-09-26 | End: 2017-09-26

## 2017-09-26 RX ORDER — OMEPRAZOLE 40 MG/1
40 CAPSULE, DELAYED RELEASE ORAL
Qty: 30 CAPSULE | Refills: 11 | Status: SHIPPED | OUTPATIENT
Start: 2017-09-26 | End: 2018-09-20

## 2017-09-26 RX ORDER — SODIUM CHLORIDE, SODIUM LACTATE, POTASSIUM CHLORIDE, CALCIUM CHLORIDE 600; 310; 30; 20 MG/100ML; MG/100ML; MG/100ML; MG/100ML
INJECTION, SOLUTION INTRAVENOUS CONTINUOUS
Status: DISCONTINUED | OUTPATIENT
Start: 2017-09-26 | End: 2017-09-26 | Stop reason: HOSPADM

## 2017-09-26 RX ADMIN — PROPOFOL 30 MG: 10 INJECTION, EMULSION INTRAVENOUS at 02:09

## 2017-09-26 RX ADMIN — SODIUM CHLORIDE, SODIUM LACTATE, POTASSIUM CHLORIDE, AND CALCIUM CHLORIDE: 600; 310; 30; 20 INJECTION, SOLUTION INTRAVENOUS at 01:09

## 2017-09-26 RX ADMIN — LIDOCAINE HYDROCHLORIDE 100 MG: 20 INJECTION, SOLUTION INTRAVENOUS at 02:09

## 2017-09-26 NOTE — BRIEF OP NOTE
Discharge Note  Short Stay      SUMMARY     Admit Date: 9/26/2017    Attending Physician: Jr Santos Jr., MD     Discharge Physician: Jr Santos Jr., MD    Discharge Date: 9/26/2017 2:56 PM    Final Diagnosis: Gastroesophageal reflux disease, esophagitis presence not specified [K21.9]    Grade A reflux esophagitis.  (Chronic?) gastritis.    Disposition: HOME OR SELF CARE    Patient Instructions:   Current Discharge Medication List      START taking these medications    Details   omeprazole (PRILOSEC) 40 MG capsule Take 1 capsule (40 mg total) by mouth before breakfast.  Qty: 30 capsule, Refills: 11      ranitidine (ZANTAC) 300 MG tablet Take 1 tablet (300 mg total) by mouth every evening. , about 30-60 minutes before bedtime.  Qty: 30 tablet, Refills: 11         CONTINUE these medications which have NOT CHANGED    Details   alprazolam (XANAX) 0.5 MG tablet TAKE 1 TABLET BY MOUTH FOUR TIMES DAILY AS NEEDED  Qty: 100 tablet, Refills: 0      lisinopril (PRINIVIL,ZESTRIL) 40 MG tablet TAKE 1 TABLET BY MOUTH EVERY DAY  Qty: 90 tablet, Refills: 0      metformin (GLUCOPHAGE) 500 MG tablet TAKE 2 TABLETS BY MOUTH TWICE DAILY  Qty: 360 tablet, Refills: 2      pravastatin (PRAVACHOL) 40 MG tablet TAKE 1 TABLET BY MOUTH EVERY DAY  Qty: 90 tablet, Refills: 3      alcohol swabs (BD ALCOHOL SWABS) PadM USE TO CHECK BLOOD GLUCOSE TWICE DAILY  Qty: 200 each, Refills: 0      ONETOUCH DELICA LANCETS 33 gauge Misc USE TO TEST BLOOD GLUCOSE TWICE DAILY  Qty: 200 each, Refills: 0      ONETOUCH VERIO Strp USE TO TEST AS DIRECTED  Qty: 100 strip, Refills: 11      ONETOUCH VERIO SYNC kit USE TO TEST BLOOD GLUOCSE TWICE DAILY  Qty: 1 each, Refills: 0             Discharge Procedure Orders (must include Diet, Follow-up, Activity)    Follow Up:  Follow up with PCP as per your routine.  Please follow an anti reflux diet.  Activity as tolerated.    No driving day of procedure.

## 2017-09-26 NOTE — ANESTHESIA PREPROCEDURE EVALUATION
09/26/2017  Carmen Hernandez is a 57 y.o., female.    Pre-op Assessment    I have reviewed the Patient Summary Reports.     I have reviewed the Nursing Notes.   I have reviewed the Medications.     Review of Systems  Anesthesia Hx:  No problems with previous Anesthesia    Social:  Non-Smoker    Cardiovascular:   Hypertension, well controlled Denies CAD.    Denies CABG/stent.   Denies Angina. hyperlipidemia    Pulmonary:  Pulmonary Normal    Renal/:   renal calculi    Hepatic/GI:   GERD    Neurological:  Neurology Normal    Endocrine:   Diabetes, well controlled, type 2    Psych:   anxiety          Physical Exam  General:  Obesity    Airway/Jaw/Neck:  Airway Findings: Mouth Opening: Normal Tongue: Normal  General Airway Assessment: Adult  Mallampati: III  Improves to II with phonation.  TM Distance: 4 - 6 cm       Chest/Lungs:  Chest/Lungs Findings: Clear to auscultation, Normal Respiratory Rate     Heart/Vascular:  Heart Findings: Rate: Normal  Rhythm: Regular Rhythm        Mental Status:  Mental Status Findings:  Cooperative, Alert and Oriented         Anesthesia Plan  Type of Anesthesia, risks & benefits discussed:  Anesthesia Type:  general  Patient's Preference:   Intra-op Monitoring Plan: standard ASA monitors  Intra-op Monitoring Plan Comments:   Post Op Pain Control Plan:   Post Op Pain Control Plan Comments:   Induction:   IV  Beta Blocker:  Patient is not currently on a Beta-Blocker (No further documentation required).       Informed Consent: Patient understands risks and agrees with Anesthesia plan.  Questions answered. Anesthesia consent signed with patient.  ASA Score: 3     Day of Surgery Review of History & Physical: I have interviewed and examined the patient. I have reviewed the patient's H&P dated: 3/21/13. There are no significant changes.  H&P update referred to the provider.          Ready For Surgery From Anesthesia Perspective.

## 2017-09-26 NOTE — DISCHARGE INSTRUCTIONS
Recovery After Procedural Sedation (Adult)  You have been given medicine by vein to make you sleep during your surgery. This may have included both a pain medicine and sleeping medicine. Most of the effects have worn off. But you may still have some drowsiness for the next 6 to 8 hours.  Home care  Follow these guidelines when you get home:  · For the next 8 hours, you should be watched by a responsible adult. This person should make sure your condition is not getting worse.  · Don't drink any alcohol for the next 24 hours.  · Don't drive, operate dangerous machinery, or make important business or personal decisions during the next 24 hours.  Note: Your healthcare provider may tell you not to take any medicine by mouth for pain or sleep in the next 4 hours. These medicines may react with the medicines you were given in the hospital. This could cause a much stronger response than usual.  Follow-up care  Follow up with your healthcare provider if you are not alert and back to your usual level of activity within 12 hours.  When to seek medical advice  Call your healthcare provider right away if any of these occur:  · Drowsiness gets worse  · Weakness or dizziness gets worse  · Repeated vomiting  · You can't be awakened   Date Last Reviewed: 10/18/2016  © 4011-5188 [employer]. [address] [place] [address], [place] [address]. All rights reserved. This information is not intended as a substitute for professional medical care. Always follow your healthcare professional's instructions.        Tips to Control Acid Reflux    To control acid reflux, youll need to make some basic diet and lifestyle changes. The simple steps outlined below may be all youll need to ease discomfort.  Watch what you eat  · Avoid fatty foods and spicy foods.  · Eat fewer acidic foods, such as citrus and tomato-based foods. These can increase symptoms.  · Limit drinking alcohol, caffeine, and fizzy beverages. All increase acid  reflux.  · Try limiting chocolate, peppermint, and spearmint. These can worsen acid reflux in some people.  Watch when you eat  · Avoid lying down for 3 hours after eating.  · Do not snack before going to bed.  Raise your head  Raising your head and upper body by 4 to 6 inches helps limit reflux when youre lying down. Put blocks under the head of your bed frame to raise it.  Other changes  · Lose weight, if you need to  · Dont exercise near bedtime  · Avoid tight-fitting clothes  · Limit aspirin and ibuprofen  · Stop smoking   Date Last Reviewed: 7/1/2016  © 3712-1832 [employer]. [address] [place] [address], [place] [address]. All rights reserved. This information is not intended as a substitute for professional medical care. Always follow your healthcare professional's instructions.

## 2017-09-26 NOTE — TRANSFER OF CARE
"Anesthesia Transfer of Care Note    Patient: Carmen Hernandez    Procedure(s) Performed: Procedure(s) (LRB):  ESOPHAGOGASTRODUODENOSCOPY (EGD) (N/A)    Patient location: PACU    Anesthesia Type: general    Transport from OR: Transported from OR on room air with adequate spontaneous ventilation    Post pain: adequate analgesia    Post assessment: no apparent anesthetic complications and tolerated procedure well    Post vital signs: stable    Level of consciousness: awake and alert    Nausea/Vomiting: no nausea/vomiting    Complications: none    Transfer of care protocol was followed      Last vitals:   Visit Vitals  BP (!) 141/65 (BP Location: Right arm)   Pulse 84   Temp 36.4 °C (97.5 °F) (Skin)   Resp 16   Ht 5' 4" (1.626 m)   Wt 103 kg (227 lb)   SpO2 (!) 16%   Breastfeeding? No   BMI 38.96 kg/m²     "

## 2017-09-26 NOTE — ANESTHESIA POSTPROCEDURE EVALUATION
"Anesthesia Post Evaluation    Patient: Carmen Hernandez    Procedure(s) Performed: Procedure(s) (LRB):  ESOPHAGOGASTRODUODENOSCOPY (EGD) (N/A)    Final Anesthesia Type: general  Patient location during evaluation: PACU  Patient participation: Yes- Able to Participate  Level of consciousness: awake and alert and oriented  Post-procedure vital signs: reviewed and stable  Pain management: adequate  Airway patency: patent  PONV status at discharge: No PONV  Anesthetic complications: no      Cardiovascular status: hemodynamically stable  Respiratory status: unassisted, spontaneous ventilation and room air  Hydration status: euvolemic  Follow-up not needed.        Visit Vitals  /75 (BP Location: Left arm, Patient Position: Lying)   Pulse 99   Temp 36.4 °C (97.5 °F) (Skin)   Resp 16   Ht 5' 4" (1.626 m)   Wt 103 kg (227 lb)   SpO2 98%   Breastfeeding? No   BMI 38.96 kg/m²       Pain/Darshana Score: Pain Assessment Performed: Yes (9/26/2017  2:40 PM)  Presence of Pain: denies (9/26/2017  2:40 PM)  Darshana Score: 9 (9/26/2017  2:40 PM)      "

## 2017-09-27 RX ORDER — BLOOD-GLUCOSE METER
EACH MISCELLANEOUS
Qty: 200 STRIP | Refills: 0 | Status: SHIPPED | OUTPATIENT
Start: 2017-09-27 | End: 2018-06-08 | Stop reason: SDUPTHER

## 2017-10-04 RX ORDER — ALPRAZOLAM 0.5 MG/1
TABLET ORAL
Qty: 100 TABLET | Refills: 0 | Status: SHIPPED | OUTPATIENT
Start: 2017-10-04 | End: 2017-11-02 | Stop reason: SDUPTHER

## 2017-10-04 RX ORDER — LISINOPRIL 40 MG/1
TABLET ORAL
Qty: 90 TABLET | Refills: 0 | Status: SHIPPED | OUTPATIENT
Start: 2017-10-04 | End: 2017-12-31 | Stop reason: SDUPTHER

## 2017-10-17 ENCOUNTER — OFFICE VISIT (OUTPATIENT)
Dept: INTERNAL MEDICINE | Facility: CLINIC | Age: 57
End: 2017-10-17
Payer: COMMERCIAL

## 2017-10-17 VITALS
BODY MASS INDEX: 38.2 KG/M2 | SYSTOLIC BLOOD PRESSURE: 124 MMHG | HEART RATE: 79 BPM | RESPIRATION RATE: 20 BRPM | HEIGHT: 64 IN | DIASTOLIC BLOOD PRESSURE: 72 MMHG | WEIGHT: 223.75 LBS | TEMPERATURE: 99 F

## 2017-10-17 DIAGNOSIS — E78.5 DYSLIPIDEMIA ASSOCIATED WITH TYPE 2 DIABETES MELLITUS: ICD-10-CM

## 2017-10-17 DIAGNOSIS — E11.69 DYSLIPIDEMIA ASSOCIATED WITH TYPE 2 DIABETES MELLITUS: ICD-10-CM

## 2017-10-17 DIAGNOSIS — E66.01 SEVERE OBESITY (BMI 35.0-39.9) WITH COMORBIDITY: ICD-10-CM

## 2017-10-17 DIAGNOSIS — E11.59 HYPERTENSION ASSOCIATED WITH DIABETES: ICD-10-CM

## 2017-10-17 DIAGNOSIS — I15.2 HYPERTENSION ASSOCIATED WITH DIABETES: ICD-10-CM

## 2017-10-17 DIAGNOSIS — E11.9 DIABETES MELLITUS WITHOUT COMPLICATION: Primary | ICD-10-CM

## 2017-10-17 PROCEDURE — 99999 PR PBB SHADOW E&M-EST. PATIENT-LVL III: CPT | Mod: PBBFAC,,, | Performed by: INTERNAL MEDICINE

## 2017-10-17 PROCEDURE — 99214 OFFICE O/P EST MOD 30 MIN: CPT | Mod: S$GLB,,, | Performed by: INTERNAL MEDICINE

## 2017-10-17 RX ORDER — DOXYCYCLINE 50 MG/1
CAPSULE ORAL
Refills: 5 | COMMUNITY
Start: 2017-10-10 | End: 2018-09-20

## 2017-10-17 NOTE — PROGRESS NOTES
This office note has been dictated.  HISTORY:  This is a 57-year-old lady with diabetes mellitus, obesity,   hypertension, dyslipidemia, following up on those issues.  We saw her in January 2017.  At that time, we had recommended adding Januvia and gave her discount   cards but that never eventuated with the patient.  She has no good explanation.    She has not been monitoring her blood pressure readings at home.  Blood sugars   have not been good.  She denies any chest pain, palpitations, syncope, cough,   shortness of breath and no significant lifestyle improvements.    CURRENT MEDICATIONS:  All noted and reviewed in the electronic medical record   medication list.    REVIEW OF SYSTEMS:  CONSTITUTIONAL:  No fever or chills, no unexpected weight changes.  CARDIOVASCULAR:  No chest pain, no palpitations, no syncope.  RESPIRATORY:  No cough or shortness of breath.  GASTROINTESTINAL:  No nausea, no vomiting, no abdominal pain or diarrhea, change   in bowel habits.  GENITOURINARY:  No change in color or character of urine.    PAST MEDICAL HISTORY, PAST SURGICAL HISTORY, FAMILY AND SOCIAL HISTORY:  All   noted and reviewed in our electronic medical record history sections.    PHYSICAL EXAMINATION:  GENERAL:  Alert, appropriately groomed lady in no acute distress.  VITAL SIGNS:  Blood pressure taken manually by this examiner is 124/72.  Other   vital signs normal.  HEENT:  Normocephalic.  NECK:  Supple, no masses, no thyromegaly.  LUNGS:  Clear to auscultation and percussion.  CARDIOVASCULAR:  Regular rate and rhythm.  No murmur, click, rub or gallop.    Carotids are full bilaterally without bruits.  No peripheral extremity edema or   ischemic changes of lower extremities.    IMPRESSION:  1.  Hypertension associated with diabetes, reasonably controlled.  2.  Dyslipidemia associated with diabetes, reasonably controlled.  3.  Diabetes mellitus, inadequate control.    PLAN:  1.  She is agreeable to try adding Januvia at  this time.  She is given discount   cards today.  She will advise if there are any issues with such.  2.  In three months, check glycohemoglobin and BMP.  3.  She declines any influenza or pneumococcal vaccines.  4.  Return to clinic in six months.      MARINA/ROGER  dd: 10/17/2017 18:30:41 (CDT)  td: 10/18/2017 13:00:25 (CDT)  Doc ID   #[id number]  Job ID #[id number]    CC:

## 2017-11-02 RX ORDER — ALPRAZOLAM 0.5 MG/1
TABLET ORAL
Qty: 100 TABLET | Refills: 0 | Status: CANCELLED | OUTPATIENT
Start: 2017-11-02

## 2017-11-02 RX ORDER — ALPRAZOLAM 0.5 MG/1
0.5 TABLET ORAL 4 TIMES DAILY PRN
Qty: 100 TABLET | Refills: 0 | Status: SHIPPED | OUTPATIENT
Start: 2017-11-02 | End: 2017-11-27 | Stop reason: SDUPTHER

## 2017-11-02 NOTE — TELEPHONE ENCOUNTER
"----- Message from Jaci Pascual sent at 11/2/2017 11:51 AM CDT -----  Contact: Self. Call  [phone]  RX request - refill or new RX.  Is this a refill or new RX:  Refill  RX name and strength: Alprazolam (XANAX) 0.5 MG tablet  Directions:   Is this a 30 day or 90 day RX:  90  Pharmacy name and phone # (DON'T enter "on file" or "in chart"):  [facility]  [phone] (Phone) or  [phone] (Fax)  Comments:        "

## 2017-11-27 RX ORDER — ALPRAZOLAM 0.5 MG/1
TABLET ORAL
Qty: 100 TABLET | Refills: 0 | Status: SHIPPED | OUTPATIENT
Start: 2017-11-27 | End: 2017-12-21 | Stop reason: SDUPTHER

## 2017-12-21 RX ORDER — ALPRAZOLAM 0.5 MG/1
TABLET ORAL
Qty: 100 TABLET | Refills: 0 | Status: SHIPPED | OUTPATIENT
Start: 2017-12-21 | End: 2017-12-23 | Stop reason: SDUPTHER

## 2017-12-26 ENCOUNTER — TELEPHONE (OUTPATIENT)
Dept: INTERNAL MEDICINE | Facility: CLINIC | Age: 57
End: 2017-12-26

## 2017-12-26 RX ORDER — ALPRAZOLAM 0.5 MG/1
TABLET ORAL
Qty: 100 TABLET | Refills: 0 | Status: SHIPPED | OUTPATIENT
Start: 2017-12-26 | End: 2018-01-22 | Stop reason: SDUPTHER

## 2017-12-26 NOTE — TELEPHONE ENCOUNTER
"----- Message from Milagro Brannon sent at 12/26/2017  8:40 AM CST -----  Contact: patient- [phone]  RX request - refill or new RX.  Is this a refill or new RX:  refill  RX name and strength: ALPRAZolam (XANAX) 0.5 MG tablet   Directions:   Is this a 30 day or 90 day RX:    Pharmacy name and phone # (DON'T enter "on file" or "in chart"):  [facility] [phone] (Phone)  [phone] (Fax)  Comments:          "

## 2017-12-29 DIAGNOSIS — E11.9 TYPE 2 DIABETES MELLITUS WITHOUT COMPLICATION: ICD-10-CM

## 2018-01-02 RX ORDER — LISINOPRIL 40 MG/1
TABLET ORAL
Qty: 90 TABLET | Refills: 0 | Status: SHIPPED | OUTPATIENT
Start: 2018-01-02 | End: 2018-07-03 | Stop reason: SDUPTHER

## 2018-01-19 ENCOUNTER — TELEPHONE (OUTPATIENT)
Dept: OBSTETRICS AND GYNECOLOGY | Facility: CLINIC | Age: 58
End: 2018-01-19

## 2018-01-19 DIAGNOSIS — Z12.31 VISIT FOR SCREENING MAMMOGRAM: Primary | ICD-10-CM

## 2018-01-19 NOTE — TELEPHONE ENCOUNTER
----- Message from Jayda Thorne sent at 1/19/2018 12:54 PM CST -----  Contact: Patient  Patient called to reschedule her Mammo and it won't let me schedule it. Please call her to schedule.  She can be contacted at [phone].    Thanks,  Jayda

## 2018-01-22 RX ORDER — ALPRAZOLAM 0.5 MG/1
TABLET ORAL
Qty: 100 TABLET | Refills: 0 | Status: SHIPPED | OUTPATIENT
Start: 2018-01-22 | End: 2018-02-15 | Stop reason: SDUPTHER

## 2018-01-23 ENCOUNTER — HOSPITAL ENCOUNTER (OUTPATIENT)
Dept: RADIOLOGY | Facility: HOSPITAL | Age: 58
Discharge: HOME | End: 2018-01-23
Attending: OBSTETRICS & GYNECOLOGY
Payer: COMMERCIAL

## 2018-01-23 DIAGNOSIS — Z12.31 VISIT FOR SCREENING MAMMOGRAM: ICD-10-CM

## 2018-01-23 PROCEDURE — 77063 BREAST TOMOSYNTHESIS BI: CPT | Mod: 26,,, | Performed by: RADIOLOGY

## 2018-01-23 PROCEDURE — 77067 SCR MAMMO BI INCL CAD: CPT | Mod: 26,,, | Performed by: RADIOLOGY

## 2018-01-23 PROCEDURE — 77067 SCR MAMMO BI INCL CAD: CPT | Mod: TC,PO

## 2018-02-05 RX ORDER — SITAGLIPTIN 100 MG/1
TABLET, FILM COATED ORAL
Qty: 30 TABLET | Refills: 0 | Status: SHIPPED | OUTPATIENT
Start: 2018-02-05 | End: 2018-03-05 | Stop reason: SDUPTHER

## 2018-02-15 RX ORDER — ALPRAZOLAM 0.5 MG/1
TABLET ORAL
Qty: 100 TABLET | Refills: 0 | Status: SHIPPED | OUTPATIENT
Start: 2018-02-15 | End: 2018-03-12 | Stop reason: SDUPTHER

## 2018-02-19 RX ORDER — METFORMIN HYDROCHLORIDE 500 MG/1
TABLET ORAL
Qty: 360 TABLET | Refills: 0 | Status: SHIPPED | OUTPATIENT
Start: 2018-02-19 | End: 2018-07-28 | Stop reason: SDUPTHER

## 2018-03-05 RX ORDER — SITAGLIPTIN 100 MG/1
TABLET, FILM COATED ORAL
Qty: 30 TABLET | Refills: 0 | Status: SHIPPED | OUTPATIENT
Start: 2018-03-05 | End: 2018-04-09 | Stop reason: SDUPTHER

## 2018-03-12 RX ORDER — ALPRAZOLAM 0.5 MG/1
TABLET ORAL
Qty: 100 TABLET | Refills: 0 | Status: SHIPPED | OUTPATIENT
Start: 2018-03-12 | End: 2018-04-05 | Stop reason: SDUPTHER

## 2018-03-23 RX ORDER — PRAVASTATIN SODIUM 40 MG/1
TABLET ORAL
Qty: 90 TABLET | Refills: 0 | Status: SHIPPED | OUTPATIENT
Start: 2018-03-23 | End: 2018-06-22 | Stop reason: SDUPTHER

## 2018-04-05 RX ORDER — ALPRAZOLAM 0.5 MG/1
TABLET ORAL
Qty: 100 TABLET | Refills: 0 | Status: SHIPPED | OUTPATIENT
Start: 2018-04-05 | End: 2018-05-01 | Stop reason: SDUPTHER

## 2018-04-09 RX ORDER — SITAGLIPTIN 100 MG/1
TABLET, FILM COATED ORAL
Qty: 30 TABLET | Refills: 0 | Status: SHIPPED | OUTPATIENT
Start: 2018-04-09 | End: 2018-05-07 | Stop reason: SDUPTHER

## 2018-05-01 RX ORDER — ALPRAZOLAM 0.5 MG/1
TABLET ORAL
Qty: 100 TABLET | Refills: 0 | Status: SHIPPED | OUTPATIENT
Start: 2018-05-01 | End: 2018-05-25 | Stop reason: SDUPTHER

## 2018-05-07 RX ORDER — SITAGLIPTIN 100 MG/1
TABLET, FILM COATED ORAL
Qty: 30 TABLET | Refills: 0 | Status: SHIPPED | OUTPATIENT
Start: 2018-05-07 | End: 2018-05-22

## 2018-05-22 ENCOUNTER — OFFICE VISIT (OUTPATIENT)
Dept: INTERNAL MEDICINE | Facility: CLINIC | Age: 58
End: 2018-05-22
Payer: COMMERCIAL

## 2018-05-22 VITALS
WEIGHT: 218.5 LBS | HEART RATE: 92 BPM | DIASTOLIC BLOOD PRESSURE: 80 MMHG | RESPIRATION RATE: 16 BRPM | SYSTOLIC BLOOD PRESSURE: 118 MMHG | HEIGHT: 64 IN | BODY MASS INDEX: 37.3 KG/M2 | TEMPERATURE: 98 F

## 2018-05-22 DIAGNOSIS — E11.69 DYSLIPIDEMIA ASSOCIATED WITH TYPE 2 DIABETES MELLITUS: ICD-10-CM

## 2018-05-22 DIAGNOSIS — I15.2 HYPERTENSION ASSOCIATED WITH DIABETES: ICD-10-CM

## 2018-05-22 DIAGNOSIS — E11.59 HYPERTENSION ASSOCIATED WITH DIABETES: ICD-10-CM

## 2018-05-22 DIAGNOSIS — E11.9 DIABETES MELLITUS WITHOUT COMPLICATION: Primary | ICD-10-CM

## 2018-05-22 DIAGNOSIS — E78.5 DYSLIPIDEMIA ASSOCIATED WITH TYPE 2 DIABETES MELLITUS: ICD-10-CM

## 2018-05-22 DIAGNOSIS — E66.01 SEVERE OBESITY (BMI 35.0-39.9) WITH COMORBIDITY: ICD-10-CM

## 2018-05-22 PROCEDURE — 99214 OFFICE O/P EST MOD 30 MIN: CPT | Mod: S$GLB,,, | Performed by: INTERNAL MEDICINE

## 2018-05-22 PROCEDURE — 99999 PR PBB SHADOW E&M-EST. PATIENT-LVL III: CPT | Mod: PBBFAC,,, | Performed by: INTERNAL MEDICINE

## 2018-05-23 PROBLEM — E11.9 DIABETES MELLITUS WITHOUT COMPLICATION: Status: ACTIVE | Noted: 2018-05-23

## 2018-05-23 NOTE — PROGRESS NOTES
History of present illness:  58-year-old female following up on hypertension, diabetes mellitus, dyslipidemia.  Since our last visit reports generally doing fairly well.  Taking all medication as directed.  She was not able to afford Januvia and he is taking metformin only for her diabetes.  Currently denies chest pain palpitations syncope cough shortness of breath edema or claudication.    Current medications:  All medications noted and reviewed and updated in the electronic medical record medication list.    Review of systems:  General: no fever, chills, generalized body aches. No unexpected weight loss.  Eyes:  No visual disturbances.  HEENT:  No hoarseness, dysphagia, ear pain.  Respiratory:  No cough, no shortness of breath.  Cardiovascular: no chest pain, palpitations, cough, exertional limb pain. No edema.  GI: no nausea, vomiting.  No abdominal pain. No change in bowel habits.  No melena, no hematochezia.  : no dysuria. No change in the color or character of the urine. No urinary frequency.  Musculoskeletal: no joint pain or swelling.  Neurologic:  No focal neurological complaints.  No headaches.  Skin:  No rashes or other concerns.    Past medical history, past surgical history, family medical history social history is all noted and reviewed in the electronic medical record history section.    Physical examination:  General:  Alert appropriately groomed lady in no acute distress.  Vital signs:  Blood pressure taken manually by this examiner is 110/78.  HEENT:  Normocephalic neck supple no masses no thyromegaly.  Lungs:  Clear to auscultation.  Cardiovascular:  Regular rate and rhythm. No significant murmur click or gallop. 2+ pedal pulses and no peripheral extremity edema.  Diabetic foot exam:    Visual Inspection:  Normal -  Bilateral    Pedal Pulses:   Right: Present  Left: Present    Posterior tibialis:   Right:Present  Left: Present      Impression:  Hypertension is well controlled.  Type 2 diabetes  mellitus due for updates.  Dyslipidemia on statin therapy.  Severe obesity BMI 37.50 with associated comorbidities as above.    Plan:  Update laboratory data to include a complete metabolic profile, glycohemoglobin, lipid profile, TSH, urine for microalbumin/creatinine ratio.  She declines pneumococcal vaccine.  Return to clinic in 6 months.

## 2018-05-25 RX ORDER — ALPRAZOLAM 0.5 MG/1
TABLET ORAL
Qty: 100 TABLET | Refills: 0 | Status: SHIPPED | OUTPATIENT
Start: 2018-05-25 | End: 2018-06-19 | Stop reason: SDUPTHER

## 2018-05-31 ENCOUNTER — LAB VISIT (OUTPATIENT)
Dept: LAB | Facility: HOSPITAL | Age: 58
End: 2018-05-31
Attending: INTERNAL MEDICINE
Payer: COMMERCIAL

## 2018-05-31 DIAGNOSIS — E78.5 DYSLIPIDEMIA ASSOCIATED WITH TYPE 2 DIABETES MELLITUS: ICD-10-CM

## 2018-05-31 DIAGNOSIS — I15.2 HYPERTENSION ASSOCIATED WITH DIABETES: ICD-10-CM

## 2018-05-31 DIAGNOSIS — E66.01 SEVERE OBESITY (BMI 35.0-39.9) WITH COMORBIDITY: ICD-10-CM

## 2018-05-31 DIAGNOSIS — E11.69 DYSLIPIDEMIA ASSOCIATED WITH TYPE 2 DIABETES MELLITUS: ICD-10-CM

## 2018-05-31 DIAGNOSIS — E11.9 DIABETES MELLITUS WITHOUT COMPLICATION: ICD-10-CM

## 2018-05-31 DIAGNOSIS — E11.59 HYPERTENSION ASSOCIATED WITH DIABETES: ICD-10-CM

## 2018-05-31 LAB
ALBUMIN SERPL BCP-MCNC: 3.7 G/DL
ALP SERPL-CCNC: 78 U/L
ALT SERPL W/O P-5'-P-CCNC: 35 U/L
ANION GAP SERPL CALC-SCNC: 7 MMOL/L
AST SERPL-CCNC: 27 U/L
BASOPHILS # BLD AUTO: 0.06 K/UL
BASOPHILS NFR BLD: 0.7 %
BILIRUB SERPL-MCNC: 0.6 MG/DL
BUN SERPL-MCNC: 10 MG/DL
CALCIUM SERPL-MCNC: 10 MG/DL
CHLORIDE SERPL-SCNC: 101 MMOL/L
CHOLEST SERPL-MCNC: 202 MG/DL
CHOLEST/HDLC SERPL: 3.8 {RATIO}
CO2 SERPL-SCNC: 27 MMOL/L
CREAT SERPL-MCNC: 0.7 MG/DL
DIFFERENTIAL METHOD: ABNORMAL
EOSINOPHIL # BLD AUTO: 0.2 K/UL
EOSINOPHIL NFR BLD: 2.7 %
ERYTHROCYTE [DISTWIDTH] IN BLOOD BY AUTOMATED COUNT: 12.3 %
EST. GFR  (AFRICAN AMERICAN): >60 ML/MIN/1.73 M^2
EST. GFR  (NON AFRICAN AMERICAN): >60 ML/MIN/1.73 M^2
ESTIMATED AVG GLUCOSE: 171 MG/DL
GLUCOSE SERPL-MCNC: 207 MG/DL
HBA1C MFR BLD HPLC: 7.6 %
HCT VFR BLD AUTO: 45 %
HDLC SERPL-MCNC: 53 MG/DL
HDLC SERPL: 26.2 %
HGB BLD-MCNC: 14.7 G/DL
IMM GRANULOCYTES # BLD AUTO: 0.03 K/UL
IMM GRANULOCYTES NFR BLD AUTO: 0.3 %
LDLC SERPL CALC-MCNC: 116 MG/DL
LYMPHOCYTES # BLD AUTO: 3.1 K/UL
LYMPHOCYTES NFR BLD: 34.4 %
MCH RBC QN AUTO: 32 PG
MCHC RBC AUTO-ENTMCNC: 32.7 G/DL
MCV RBC AUTO: 98 FL
MONOCYTES # BLD AUTO: 0.6 K/UL
MONOCYTES NFR BLD: 7 %
NEUTROPHILS # BLD AUTO: 5 K/UL
NEUTROPHILS NFR BLD: 54.9 %
NONHDLC SERPL-MCNC: 149 MG/DL
NRBC BLD-RTO: 0 /100 WBC
PLATELET # BLD AUTO: 295 K/UL
PMV BLD AUTO: 10.7 FL
POTASSIUM SERPL-SCNC: 4.7 MMOL/L
PROT SERPL-MCNC: 6.9 G/DL
RBC # BLD AUTO: 4.59 M/UL
SODIUM SERPL-SCNC: 135 MMOL/L
TRIGL SERPL-MCNC: 165 MG/DL
TSH SERPL DL<=0.005 MIU/L-ACNC: 3.96 UIU/ML
WBC # BLD AUTO: 9.03 K/UL

## 2018-05-31 PROCEDURE — 85025 COMPLETE CBC W/AUTO DIFF WBC: CPT

## 2018-05-31 PROCEDURE — 83036 HEMOGLOBIN GLYCOSYLATED A1C: CPT

## 2018-05-31 PROCEDURE — 80053 COMPREHEN METABOLIC PANEL: CPT

## 2018-05-31 PROCEDURE — 84443 ASSAY THYROID STIM HORMONE: CPT

## 2018-05-31 PROCEDURE — 80061 LIPID PANEL: CPT

## 2018-05-31 PROCEDURE — 36415 COLL VENOUS BLD VENIPUNCTURE: CPT | Mod: PO

## 2018-06-08 RX ORDER — BLOOD-GLUCOSE METER
EACH MISCELLANEOUS
Qty: 200 STRIP | Refills: 0 | Status: SHIPPED | OUTPATIENT
Start: 2018-06-08 | End: 2018-09-09 | Stop reason: SDUPTHER

## 2018-06-19 RX ORDER — ALPRAZOLAM 0.5 MG/1
TABLET ORAL
Qty: 100 TABLET | Refills: 0 | Status: SHIPPED | OUTPATIENT
Start: 2018-06-19 | End: 2018-07-16 | Stop reason: SDUPTHER

## 2018-06-22 RX ORDER — PRAVASTATIN SODIUM 40 MG/1
TABLET ORAL
Qty: 90 TABLET | Refills: 3 | Status: SHIPPED | OUTPATIENT
Start: 2018-06-22 | End: 2020-10-13 | Stop reason: SDUPTHER

## 2018-06-25 ENCOUNTER — OFFICE VISIT (OUTPATIENT)
Dept: GASTROENTEROLOGY | Facility: CLINIC | Age: 58
End: 2018-06-25
Payer: COMMERCIAL

## 2018-06-25 ENCOUNTER — TELEPHONE (OUTPATIENT)
Dept: GASTROENTEROLOGY | Facility: CLINIC | Age: 58
End: 2018-06-25

## 2018-06-25 VITALS
HEIGHT: 64 IN | DIASTOLIC BLOOD PRESSURE: 81 MMHG | BODY MASS INDEX: 37.92 KG/M2 | HEART RATE: 80 BPM | SYSTOLIC BLOOD PRESSURE: 129 MMHG | WEIGHT: 222.13 LBS

## 2018-06-25 DIAGNOSIS — K21.9 GASTROESOPHAGEAL REFLUX DISEASE, ESOPHAGITIS PRESENCE NOT SPECIFIED: Primary | ICD-10-CM

## 2018-06-25 PROCEDURE — 99999 PR PBB SHADOW E&M-EST. PATIENT-LVL III: CPT | Mod: PBBFAC,,, | Performed by: INTERNAL MEDICINE

## 2018-06-25 PROCEDURE — 99213 OFFICE O/P EST LOW 20 MIN: CPT | Mod: S$GLB,,, | Performed by: INTERNAL MEDICINE

## 2018-06-25 NOTE — PROGRESS NOTES
Subjective:       Patient ID: Carmen Hernandez is a 58 y.o. female.    Chief Complaint: Gastroesophageal Reflux (discuss medication)     presents for f/u after her EGD last Sept.  At present she is doing fairly well.  In fact, she reports she didn't take the ranitidine because the Prilosec worked well by itself.  HOWEVER, in the last few months she felt her heart racing sometimes.  She stopped the Prilosec, and did not notice any heart racing.  (But soc/famil hx signif, in that her mother recently passed away).    Also noted, her last colon was in May, 2013, and was recommended to repeat at 5-6 years.        EGD 9/26/2017  Impression:  - Normal oropharynx.                       - Normal proximal esophagus and mid esophagus.                       - LA Grade A reflux esophagitis.                       - Z-line regular, 36 cm from the incisors.                       - Chronic gastritis. Biopsied.                       - Normal stomach otherwise.                       - Normal examined duodenum.  Recommendation:      - Discharge patient to home.                       - Await pathology and CLOtest results.                       - Follow an antireflux regimen.                       - Use Prilosec (omeprazole) 40 mg PO daily.                       - Use Zantac (ranitidine) 300 mg PO nighty.                       - Call the G.I. clinic in 2 weeks for reports (if                        you haven't heard from us sooner) [phone].                       - Return to GI clinic in 6-8 weeks.  Jr Santos MD  9/26/2017      Colonoscopy 5/16/2013  Impression:  - Diverticulosis in the sigmoid colon.                       - Tortuous colon.                       - Irritable bowel syndrome?.                       - Internal hemorrhoids.                       - Redundant colon.                       - The examination was otherwise normal.                       - The examined portion of the ileum was  normal.  Recommendation:      - Discharge patient to home.                       - High fiber diet.                       - Take a PROBIOTIC, such as a carton of ACTIVIA                        Yogurt or DANNON Greek Yogurt, or similar; or                        tablets of Align or Culturelle or Jyoti-Q, every day                        for a month.                       - Use Bentyl (dicyclomine) 20 mg PO QID 30 min AC                        and HS PRN.                       - Return to referring physician, Dr. Coleman, as                        previously scheduled.                       - Repeat colonoscopy in 5-6 years for surveillance.  Jr Santos MD  5/16/2013      Review of Systems   Constitutional: Negative for appetite change, fever and unexpected weight change.   HENT: Negative.  Negative for mouth sores, sore throat and trouble swallowing.    Eyes: Negative.    Respiratory: Negative.  Negative for cough and choking.    Cardiovascular: Positive for palpitations. Negative for chest pain and leg swelling.   Gastrointestinal: Negative for abdominal distention, abdominal pain, anal bleeding, blood in stool, constipation, diarrhea, nausea and vomiting.   Genitourinary: Negative.    Musculoskeletal: Negative.    Skin: Negative.  Negative for color change and rash.   Neurological: Negative.    Hematological: Negative for adenopathy.   Psychiatric/Behavioral: Negative.        Objective:      Physical Exam   Constitutional: She is oriented to person, place, and time. She appears well-developed and well-nourished.   HENT:   Mouth/Throat: Oropharynx is clear and moist. No oropharyngeal exudate.   Eyes: No scleral icterus.   Neck: No tracheal deviation present. No thyromegaly present.   Cardiovascular: Normal rate, regular rhythm and normal heart sounds.    Pulmonary/Chest: Effort normal and breath sounds normal.   Abdominal: Soft. Bowel sounds are normal. She exhibits no distension and no mass. There is no  tenderness. There is no guarding.   Rotund.  Flat, with normal bowel sounds.  Soft.  Nontender.  No masses, no organomegaly.   Lymphadenopathy:     She has no cervical adenopathy.   Neurological: She is alert and oriented to person, place, and time.   Skin: Skin is warm and dry. No rash noted.   Psychiatric: She has a normal mood and affect.   Nursing note and vitals reviewed.      Assessment:     Gastroesophageal reflux disease, esophagitis presence not specified    Class II obesity with body mass index (BMI) of greater than 35 to 39.9 and comorbidity          Plan:        1. Re-try the Prilosec, to see if better tolerates. But if not, will try change to Protonix.   2. She will let us know about the colonoscopy.

## 2018-06-25 NOTE — TELEPHONE ENCOUNTER
----- Message from Kaya Delarosa sent at 6/25/2018  1:44 PM CDT -----  Contact: pt [phone]  Patient called she is concerned  To see if her visit will be covered she states she was told this visit may not be covered. Please call back to verify.

## 2018-06-25 NOTE — TELEPHONE ENCOUNTER
LMOM for pt   I never said visit today would not be covered told her we could probably ask Dr Santos if he could either try another medicine or if he wanted to do another EGD.

## 2018-07-03 RX ORDER — LISINOPRIL 40 MG/1
TABLET ORAL
Qty: 90 TABLET | Refills: 0 | Status: SHIPPED | OUTPATIENT
Start: 2018-07-03 | End: 2018-09-29 | Stop reason: SDUPTHER

## 2018-07-16 RX ORDER — ALPRAZOLAM 0.5 MG/1
TABLET ORAL
Qty: 100 TABLET | Refills: 0 | Status: SHIPPED | OUTPATIENT
Start: 2018-07-16 | End: 2018-08-12 | Stop reason: SDUPTHER

## 2018-07-30 RX ORDER — METFORMIN HYDROCHLORIDE 500 MG/1
TABLET ORAL
Qty: 360 TABLET | Refills: 0 | Status: SHIPPED | OUTPATIENT
Start: 2018-07-30 | End: 2018-10-02 | Stop reason: SDUPTHER

## 2018-08-10 ENCOUNTER — OFFICE VISIT (OUTPATIENT)
Dept: DERMATOLOGY | Facility: CLINIC | Age: 58
End: 2018-08-10
Payer: COMMERCIAL

## 2018-08-10 DIAGNOSIS — L82.0 INFLAMED SEBORRHEIC KERATOSIS: ICD-10-CM

## 2018-08-10 DIAGNOSIS — L40.0 PSORIASIS VULGARIS: Primary | ICD-10-CM

## 2018-08-10 DIAGNOSIS — L82.1 SEBORRHEIC KERATOSES: ICD-10-CM

## 2018-08-10 DIAGNOSIS — D18.00 ANGIOMA: ICD-10-CM

## 2018-08-10 PROCEDURE — 99214 OFFICE O/P EST MOD 30 MIN: CPT | Mod: 25,S$GLB,, | Performed by: DERMATOLOGY

## 2018-08-10 PROCEDURE — 99999 PR PBB SHADOW E&M-EST. PATIENT-LVL II: CPT | Mod: PBBFAC,,, | Performed by: DERMATOLOGY

## 2018-08-10 PROCEDURE — 17110 DESTRUCTION B9 LES UP TO 14: CPT | Mod: S$GLB,,, | Performed by: DERMATOLOGY

## 2018-08-10 RX ORDER — FLUOCINONIDE TOPICAL SOLUTION USP, 0.05% 0.5 MG/ML
SOLUTION TOPICAL 2 TIMES DAILY
Qty: 60 ML | Refills: 0 | Status: SHIPPED | OUTPATIENT
Start: 2018-08-10 | End: 2019-04-29

## 2018-08-10 RX ORDER — FLUOCINONIDE 0.5 MG/G
CREAM TOPICAL 2 TIMES DAILY
Qty: 30 G | Refills: 1 | Status: SHIPPED | OUTPATIENT
Start: 2018-08-10 | End: 2019-04-29 | Stop reason: SDUPTHER

## 2018-08-10 NOTE — PROGRESS NOTES
Subjective:       Patient ID:  Carmen Hernandez is a 58 y.o. female who presents for   Chief Complaint   Patient presents with    Itching    Lesion     57 yo F presents for evaluation of flaking in her ears and itching on her legs x 1 year.  She has been using Amlactin cream which has not helped much.  She was prescribed fluocinolone oil and drops in 2014 by Dr To and 2016 by Dr Da Silva in the past but did not use (does not remember)      There is a lesion on her back for 3 months, itchy & scaly, not treated in the past but she would like to have it treated today        Brother with possible h/o psoriasis  Past Medical History:   Diagnosis Date    Anxiety     Bilateral dry eyes     uses atears//    Cataract     Diabetes mellitus     LBSL 128 today---fluctuates    Hyperlipidemia     Hypertension     Nephrolithiasis     Severe obesity (BMI 35.0-39.9) with comorbidity      Review of Systems   Musculoskeletal: Negative for joint swelling and arthralgias.   Skin: Positive for itching, rash and dry skin. Negative for abscesses.   Hematologic/Lymphatic: Does not bruise/bleed easily.        Objective:    Physical Exam   Constitutional: She appears well-developed and well-nourished.   Neurological: She is alert and oriented to person, place, and time.   Psychiatric: She has a normal mood and affect.   Skin:   Areas Examined (abnormalities noted in diagram):   Scalp / Hair Palpated and Inspected  Head / Face Inspection Performed  Neck Inspection Performed  Chest / Axilla Inspection Performed  Abdomen Inspection Performed  Back Inspection Performed  RUE Inspected  LUE Inspection Performed  RLE Inspected  LLE Inspection Performed  Nails and Digits Inspection Performed                       Diagram Legend     Erythematous scaling macule/papule c/w actinic keratosis       Vascular papule c/w angioma      Pigmented verrucoid papule/plaque c/w seborrheic keratosis      Yellow umbilicated papule c/w sebaceous  hyperplasia      Irregularly shaped tan macule c/w lentigo     1-2 mm smooth white papules consistent with Milia      Movable subcutaneous cyst with punctum c/w epidermal inclusion cyst      Subcutaneous movable cyst c/w pilar cyst      Firm pink to brown papule c/w dermatofibroma      Pedunculated fleshy papule(s) c/w skin tag(s)      Evenly pigmented macule c/w junctional nevus     Mildly variegated pigmented, slightly irregular-bordered macule c/w mildly atypical nevus      Flesh colored to evenly pigmented papule c/w intradermal nevus       Pink pearly papule/plaque c/w basal cell carcinoma      Erythematous hyperkeratotic cursted plaque c/w SCC      Surgical scar with no sign of skin cancer recurrence      Open and closed comedones      Inflammatory papules and pustules      Verrucoid papule consistent consistent with wart     Erythematous eczematous patches and plaques     Dystrophic onycholytic nail with subungual debris c/w onychomycosis     Umbilicated papule    Erythematous-base heme-crusted tan verrucoid plaque consistent with inflamed seborrheic keratosis     Erythematous Silvery Scaling Plaque c/w Psoriasis     See annotation      Assessment / Plan:        Psoriasis vulgaris  Pt would like to start with topical treatment for now  Discussed Dx, mgmt options including topicals, NBUVB, oral medications such as MTX, and biologics     In patients with psoriasis, the prevalence rates of cardiovascular risk factors are increased, including hypertension, diabetes mellitus, dyslipidemia, obesity, and metabolic syndrome.  Continue to follow with PCP for mgmt of CV risk factors     -     fluocinonide (LIDEX) 0.05 % external solution; Apply topically 2 (two) times daily. Scalp, ears  Dispense: 60 mL; Refill: 0  -     fluocinonide 0.05% (LIDEX) 0.05 % cream; Apply topically 2 (two) times daily. legs  Dispense: 30 g; Refill: 1    Inflamed seborrheic keratosis/  Cryosurgery procedure note:    Verbal consent from the  patient is obtained. Liquid nitrogen cryosurgery is applied to 1 lesion to produce a freeze injury. The patient is aware that blisters may form and is instructed on wound care with gentle cleansing and use of vaseline ointment to keep moist until healed. The patient is supplied a handout on cryosurgery and is instructed to call if lesion does not completely resolve.    Seborrheic keratoses  These are benign inherited growths without a malignant potential. Reassurance given to patient. No treatment is necessary.     Angioma  This is a benign vascular lesion. Reassurance given. No treatment required.              Follow-up in about 2 months (around 10/10/2018).

## 2018-08-13 RX ORDER — ALPRAZOLAM 0.5 MG/1
TABLET ORAL
Qty: 100 TABLET | Refills: 0 | Status: SHIPPED | OUTPATIENT
Start: 2018-08-13 | End: 2018-09-06 | Stop reason: SDUPTHER

## 2018-08-20 ENCOUNTER — TELEPHONE (OUTPATIENT)
Dept: INTERNAL MEDICINE | Facility: CLINIC | Age: 58
End: 2018-08-20

## 2018-08-20 NOTE — TELEPHONE ENCOUNTER
Spoke with pt to advise that pt come in for an appt.    Verbalized understanding.    Pt is concerned that the diarrhea may be related to a more serious issue thus she will go to the ER tonight and f/u with Dr. Bentley as scheduled if needed.

## 2018-08-20 NOTE — TELEPHONE ENCOUNTER
----- Message from Mya Bermeo sent at 8/20/2018  1:52 PM CDT -----  Contact: Pt Mobile/Home [phone]  Patient is calling in regards to her having diarrhea for a week now. She would like to know if she should come in to be seen to have a blood test?

## 2018-08-21 ENCOUNTER — OFFICE VISIT (OUTPATIENT)
Dept: INTERNAL MEDICINE | Facility: CLINIC | Age: 58
End: 2018-08-21
Payer: COMMERCIAL

## 2018-08-21 ENCOUNTER — LAB VISIT (OUTPATIENT)
Dept: LAB | Facility: HOSPITAL | Age: 58
End: 2018-08-21
Attending: INTERNAL MEDICINE
Payer: COMMERCIAL

## 2018-08-21 VITALS
BODY MASS INDEX: 37.04 KG/M2 | OXYGEN SATURATION: 98 % | DIASTOLIC BLOOD PRESSURE: 74 MMHG | WEIGHT: 216.94 LBS | TEMPERATURE: 99 F | HEART RATE: 79 BPM | HEIGHT: 64 IN | SYSTOLIC BLOOD PRESSURE: 142 MMHG

## 2018-08-21 DIAGNOSIS — E11.59 HYPERTENSION ASSOCIATED WITH DIABETES: ICD-10-CM

## 2018-08-21 DIAGNOSIS — R19.5 LOOSE STOOLS: Primary | ICD-10-CM

## 2018-08-21 DIAGNOSIS — R19.5 LOOSE STOOLS: ICD-10-CM

## 2018-08-21 DIAGNOSIS — I15.2 HYPERTENSION ASSOCIATED WITH DIABETES: ICD-10-CM

## 2018-08-21 DIAGNOSIS — E11.9 DIABETES MELLITUS WITHOUT COMPLICATION: ICD-10-CM

## 2018-08-21 LAB
ALBUMIN SERPL BCP-MCNC: 3.9 G/DL
ALP SERPL-CCNC: 71 U/L
ALT SERPL W/O P-5'-P-CCNC: 39 U/L
ANION GAP SERPL CALC-SCNC: 10 MMOL/L
AST SERPL-CCNC: 25 U/L
BASOPHILS # BLD AUTO: 0.04 K/UL
BASOPHILS NFR BLD: 0.4 %
BILIRUB SERPL-MCNC: 0.4 MG/DL
BUN SERPL-MCNC: 8 MG/DL
CALCIUM SERPL-MCNC: 10.3 MG/DL
CHLORIDE SERPL-SCNC: 106 MMOL/L
CO2 SERPL-SCNC: 25 MMOL/L
CREAT SERPL-MCNC: 0.7 MG/DL
DIFFERENTIAL METHOD: ABNORMAL
EOSINOPHIL # BLD AUTO: 0.1 K/UL
EOSINOPHIL NFR BLD: 1 %
ERYTHROCYTE [DISTWIDTH] IN BLOOD BY AUTOMATED COUNT: 12.5 %
ERYTHROCYTE [SEDIMENTATION RATE] IN BLOOD BY WESTERGREN METHOD: <2 MM/HR
EST. GFR  (AFRICAN AMERICAN): >60 ML/MIN/1.73 M^2
EST. GFR  (NON AFRICAN AMERICAN): >60 ML/MIN/1.73 M^2
ESTIMATED AVG GLUCOSE: 169 MG/DL
GLUCOSE SERPL-MCNC: 162 MG/DL
HBA1C MFR BLD HPLC: 7.5 %
HCT VFR BLD AUTO: 43.1 %
HGB BLD-MCNC: 14.5 G/DL
IMM GRANULOCYTES # BLD AUTO: 0.03 K/UL
IMM GRANULOCYTES NFR BLD AUTO: 0.3 %
LYMPHOCYTES # BLD AUTO: 2.2 K/UL
LYMPHOCYTES NFR BLD: 21.4 %
MCH RBC QN AUTO: 32.7 PG
MCHC RBC AUTO-ENTMCNC: 33.6 G/DL
MCV RBC AUTO: 97 FL
MONOCYTES # BLD AUTO: 0.6 K/UL
MONOCYTES NFR BLD: 5.8 %
NEUTROPHILS # BLD AUTO: 7.5 K/UL
NEUTROPHILS NFR BLD: 71.1 %
NRBC BLD-RTO: 0 /100 WBC
PLATELET # BLD AUTO: 316 K/UL
PMV BLD AUTO: 10.6 FL
POTASSIUM SERPL-SCNC: 4.4 MMOL/L
PROT SERPL-MCNC: 6.9 G/DL
RBC # BLD AUTO: 4.44 M/UL
SODIUM SERPL-SCNC: 141 MMOL/L
WBC # BLD AUTO: 10.48 K/UL

## 2018-08-21 PROCEDURE — 36415 COLL VENOUS BLD VENIPUNCTURE: CPT | Mod: PO

## 2018-08-21 PROCEDURE — 83036 HEMOGLOBIN GLYCOSYLATED A1C: CPT

## 2018-08-21 PROCEDURE — 85025 COMPLETE CBC W/AUTO DIFF WBC: CPT

## 2018-08-21 PROCEDURE — 99214 OFFICE O/P EST MOD 30 MIN: CPT | Mod: S$GLB,,, | Performed by: INTERNAL MEDICINE

## 2018-08-21 PROCEDURE — 80053 COMPREHEN METABOLIC PANEL: CPT

## 2018-08-21 PROCEDURE — 99999 PR PBB SHADOW E&M-EST. PATIENT-LVL III: CPT | Mod: PBBFAC,,, | Performed by: INTERNAL MEDICINE

## 2018-08-21 PROCEDURE — 85652 RBC SED RATE AUTOMATED: CPT

## 2018-08-23 ENCOUNTER — TELEPHONE (OUTPATIENT)
Dept: INTERNAL MEDICINE | Facility: CLINIC | Age: 58
End: 2018-08-23

## 2018-08-23 NOTE — PROGRESS NOTES
History of present illness:  The 58-year-old female with hypertension diabetes mellitus and others.  In today to talk about some recent loose stools.  The last week or so she has had occasional loose stool.  One day she had 4 bowel movements per day.  Other days just 1 or 2.  There is no blood no mucus not associated with any abdominal cramping abdominal pain nausea vomiting or other she had recently been given doxycycline which she took until August 11th her symptoms started a couple days after such that was given for skin condition patient has chronic constipation and has used MiraLax and prune juice on a regular basis.  She stopped that recently with the loose stools.  No fever no chills.  No generalized body aches.  She had a colonoscopy in May of 2013 and had an EGD performed last year..  She has an outside gastroenterologist Dr. Santos.  No recent travel.  No other new medications other than the doxycycline referred to above    Current medications:  All medications noted and reviewed in the electronic medical record medication list.    Review of systems:  Constitutional:  No fever no chills.  HEENT:  No hoarseness no dysphagia.  Respiratory:  No cough shortness of breath.  Cardiovascular:  No chest pain palpitations syncope.  :  No dysuria or frequency change in color character urine.    Past medical history, past surgical history family medical history social history is are all noted and reviewed in the electronic medical record history sections.    Physical examination:  General:  Alert appropriately groomed lady no acute distress  Vital signs:  Afebrile other vital signs noted and reviewed  Eyes:  Sclerae white not icteric  HEENT:  Normocephalic.  Neck supple no masses no thyromegaly.  Mouth pharynx normal.  No thrush.  Lungs:  Clear to auscultation.  Cardiovascular:  Normal heart sounds no significant murmur.  GI exam:  The abdomen is soft benign no masses no tenderness no  organomegaly.    Impression:  Recent minimal loose stools, nonspecific.  Could be related to the recent antibiotic use of her symptoms are improving.  Type 2 diabetes mellitus needs update.  Hypertension, systolic is mildly elevated today.    Plan:  Lab data to include CBC metabolic profile glycohemoglobin sedimentation rate.  If unremarkable observe symptoms and if persists over the next couple of weeks she is advised to follow up with her gastroenterologist.

## 2018-08-23 NOTE — TELEPHONE ENCOUNTER
Advise lab work just performed generally looked quite good other than diabetic control remains less than ideal.  I recommend adding a 2nd medication to her diabetic regimen if she is agreeable.  We would add Januvia.  Let me know if she is receptive to such and we will sent to her pharmacy and advise follow-up lab data.

## 2018-08-24 ENCOUNTER — TELEPHONE (OUTPATIENT)
Dept: INTERNAL MEDICINE | Facility: CLINIC | Age: 58
End: 2018-08-24

## 2018-08-24 NOTE — TELEPHONE ENCOUNTER
----- Message from Stephane Vega sent at 8/24/2018 11:50 AM CDT -----  Contact: Patient [phone]  Patient would like to get test results.  Name of test (lab, mammo, etc.):  NON FASTING LAB [[id number]  Date of test:  8/21/18  Ordering provider: Dr Bentley  Where was the test performed: [facility]  Comments:  Stating stool is more formed but is very pale.    Please call an advise  Thank you

## 2018-08-24 NOTE — TELEPHONE ENCOUNTER
Spoke with pt who advises that she had a loose stool at 1 pm today and noted that it returned to it's normal color.    However, she notes some abd tenderness but denies n/v or fever.    Pt states that she is not having frequent stools as the last two BMs were 12 hours apart.    Please advise.    Thanks.

## 2018-08-24 NOTE — TELEPHONE ENCOUNTER
Spoke with pt to advise of lab results and MD recommendations.    Verbalized understanding.    Pt is agreeable to Januvia.    Please send to pharmacy.    Thanks.

## 2018-08-24 NOTE — TELEPHONE ENCOUNTER
Attempted to contact the pt with lab results.    No answer.    Please advise regarding stools.    Thanks.

## 2018-08-24 NOTE — TELEPHONE ENCOUNTER
Advise januvia sent to pharm--bmp, a1c in 3 mos.  Continue to observe stolls. As we discussed if continue to be abnormal  Advise f/u with Dr Santos.

## 2018-08-29 ENCOUNTER — TELEPHONE (OUTPATIENT)
Dept: INTERNAL MEDICINE | Facility: CLINIC | Age: 58
End: 2018-08-29

## 2018-08-29 DIAGNOSIS — E11.9 DIABETES MELLITUS WITHOUT COMPLICATION: Primary | ICD-10-CM

## 2018-08-29 NOTE — TELEPHONE ENCOUNTER
----- Message from Jaci Pascual sent at 8/28/2018  2:09 PM CDT -----  Contact: Self Call [phone]  Patient is returning a phone call.  Who left a message for the patient: Alanna   Does patient know what this is regarding:  Her issue  Comments:

## 2018-08-29 NOTE — TELEPHONE ENCOUNTER
See phone call from 8/24/18.    Spoke with pt to advise.    Verbalized understanding and states that she does have an appt with Gastro NP in Dr. Santos's office.    Orders entered for diabetic lab f/u.

## 2018-09-06 ENCOUNTER — TELEPHONE (OUTPATIENT)
Dept: GASTROENTEROLOGY | Facility: CLINIC | Age: 58
End: 2018-09-06

## 2018-09-06 RX ORDER — ALPRAZOLAM 0.5 MG/1
TABLET ORAL
Qty: 100 TABLET | Refills: 0 | Status: SHIPPED | OUTPATIENT
Start: 2018-09-06 | End: 2018-10-02 | Stop reason: SDUPTHER

## 2018-09-06 RX ORDER — ALPRAZOLAM 0.5 MG/1
TABLET ORAL
Qty: 100 TABLET | Refills: 0 | OUTPATIENT
Start: 2018-09-06

## 2018-09-06 NOTE — TELEPHONE ENCOUNTER
----- Message from Maggie Cuba sent at 9/6/2018  3:31 PM CDT -----  Type:  Sooner Apoointment Request    Caller is requesting a sooner appointment.  Caller declined first available appointment listed below.  Caller will not accept being placed on the waitlist and is requesting a message be sent to doctor.    Name of Caller:  Patient  When is the first available appointment?  9/20/18  Symptoms:  diarrhea  Best Call Back Number:  [phone] (home)     Additional Information:  Would like to be seen sooner if available

## 2018-09-06 NOTE — TELEPHONE ENCOUNTER
----- Message from Paco Rios sent at 9/6/2018  2:43 PM CDT -----  Contact: Patient [phone]  Is this a refill or new RX:  Refill    RX name and strength ALPRAZolam (XANAX) 0.5 MG tablet    Pharmacy name and phone #[facility] [address] [address] [address] [facility] [address] [phone] (Phone) [phone] (Fax)

## 2018-09-10 RX ORDER — BLOOD-GLUCOSE METER
EACH MISCELLANEOUS
Qty: 200 STRIP | Refills: 6 | Status: SHIPPED | OUTPATIENT
Start: 2018-09-10 | End: 2020-01-02

## 2018-09-20 ENCOUNTER — HOSPITAL ENCOUNTER (OUTPATIENT)
Dept: RADIOLOGY | Facility: HOSPITAL | Age: 58
Discharge: HOME | End: 2018-09-20
Attending: NURSE PRACTITIONER
Payer: COMMERCIAL

## 2018-09-20 ENCOUNTER — OFFICE VISIT (OUTPATIENT)
Dept: GASTROENTEROLOGY | Facility: CLINIC | Age: 58
End: 2018-09-20
Payer: COMMERCIAL

## 2018-09-20 VITALS
SYSTOLIC BLOOD PRESSURE: 132 MMHG | DIASTOLIC BLOOD PRESSURE: 67 MMHG | HEIGHT: 64 IN | BODY MASS INDEX: 37.04 KG/M2 | WEIGHT: 216.94 LBS | HEART RATE: 85 BPM

## 2018-09-20 DIAGNOSIS — R10.30 LOWER ABDOMINAL PAIN: ICD-10-CM

## 2018-09-20 DIAGNOSIS — Z86.39 HISTORY OF DIABETES MELLITUS: ICD-10-CM

## 2018-09-20 DIAGNOSIS — R19.4 CHANGE IN BOWEL HABITS: ICD-10-CM

## 2018-09-20 DIAGNOSIS — R19.7 DIARRHEA, UNSPECIFIED TYPE: Primary | ICD-10-CM

## 2018-09-20 DIAGNOSIS — Z86.010 HISTORY OF COLON POLYPS: ICD-10-CM

## 2018-09-20 DIAGNOSIS — R19.7 DIARRHEA, UNSPECIFIED TYPE: ICD-10-CM

## 2018-09-20 PROCEDURE — 74019 RADEX ABDOMEN 2 VIEWS: CPT | Mod: TC,FY,PO

## 2018-09-20 PROCEDURE — 74019 RADEX ABDOMEN 2 VIEWS: CPT | Mod: 26,,, | Performed by: RADIOLOGY

## 2018-09-20 PROCEDURE — 99214 OFFICE O/P EST MOD 30 MIN: CPT | Mod: S$GLB,,, | Performed by: NURSE PRACTITIONER

## 2018-09-20 PROCEDURE — 99999 PR PBB SHADOW E&M-EST. PATIENT-LVL IV: CPT | Mod: PBBFAC,,, | Performed by: NURSE PRACTITIONER

## 2018-09-20 NOTE — PROGRESS NOTES
Subjective:       Patient ID: Carmen Hernandez is a 58 y.o. female Body mass index is 37.24 kg/m².    Chief Complaint: Diarrhea (for 38 days)    This patient is new to me.  Established with Dr. Tom Calzada    The current episode started more than 1 month ago (started about 38 days ago after seeing dermatology). The problem occurs 2 to 4 times per day (mostly in the morning). The problem has been gradually improving. The stool consistency is described as watery (to soft pasty). The patient states that diarrhea does not awaken her from sleep. Associated symptoms include abdominal pain (tenderness to lower abdomen, denies currently), bloating (occasional) and weight loss (lost 9 lbs over the past year relates to death of her mother). Pertinent negatives include no chills, coughing, fever or vomiting. Associated symptoms comments: Occasional flatulence. Risk factors include recent antibiotic use (doxycycline recently for rosacea, metformin use been on for years; denies recent hospitalization, ill contacts, foreign travel, or suspect food intake). She has tried change of diet and increased fluids for the symptoms. Her past medical history is significant for irritable bowel syndrome. There is no history of inflammatory bowel disease.     Review of Systems   Constitutional: Positive for weight loss (lost 9 lbs over the past year relates to death of her mother). Negative for appetite change, chills, fatigue and fever.   HENT: Negative for sore throat and trouble swallowing.    Respiratory: Negative for cough, choking and shortness of breath.    Cardiovascular: Negative for chest pain.   Gastrointestinal: Positive for abdominal pain (tenderness to lower abdomen, denies currently), bloating (occasional) and diarrhea. Negative for anal bleeding, blood in stool, constipation (history of constipation, was using miralax, reports change in bowel habits), nausea, rectal pain and vomiting.   Genitourinary: Negative for  difficulty urinating, dysuria and flank pain.   Neurological: Negative for weakness.       Past Medical History:   Diagnosis Date    Anxiety     Bilateral dry eyes     uses atears//    Cataract     Diabetes mellitus     LBSL 128 today---fluctuates    Diverticulosis     Hyperlipidemia     Hypertension     Irritable bowel syndrome     Nephrolithiasis     Severe obesity (BMI 35.0-39.9) with comorbidity      Past Surgical History:   Procedure Laterality Date    COLONOSCOPY  05/16/2013    PAM.   Diverticulosis in the sigmoid colon.  Tortuous colon.  Irritable bowel syndrome?.  Internal hemorrhoids.  Redundant colon. repeat in 5-6 years    COLONOSCOPY N/A 5/16/2013    Performed by Jr [facility] at [facility]    COLONOSCOPY W/ POLYPECTOMY  5/13/2009  Rohan SPENCER.   One 4 mm polyp in the transverse colon.  HYPERPLASTIC POLYP.    Diverticulosis sigmoid colon.    Tortuous colon.   Granularity hepatic flexure.     CYSTOSCOPY W/ STONE MANIPULATION      ENDOMETRIAL BIOPSY  X2    ESOPHAGOGASTRODUODENOSCOPY (EGD) N/A 9/26/2017    Performed by Jr [facility] Jr., [facility] at [facility]    HYSTERECTOMY      OOPHORECTOMY      UPPER GASTROINTESTINAL ENDOSCOPY  09/26/2017    Dr. Santos     Family History   Problem Relation Age of Onset    Cataracts Mother     Heart disease Mother     Hypertension Mother     Diabetes Father     Hypertension Father     Diabetes Brother     Hypertension Brother     Macular degeneration Neg Hx     Retinal detachment Neg Hx     Strabismus Neg Hx     Stroke Neg Hx     Thyroid disease Neg Hx     Glaucoma Neg Hx     Cancer Neg Hx     Amblyopia Neg Hx     Blindness Neg Hx     Breast cancer Neg Hx     Colon cancer Neg Hx     Colon polyps Neg Hx     Crohn's disease Neg Hx     Ulcerative colitis Neg Hx     Stomach cancer Neg Hx     Esophageal cancer Neg Hx     Celiac disease Neg Hx      Wt Readings from Last 10 Encounters:   09/20/18 98.4 kg (216 lb 14.9  oz)   08/21/18 98.4 kg (216 lb 14.9 oz)   06/25/18 100.7 kg (222 lb 1.6 oz)   05/22/18 99.1 kg (218 lb 7.6 oz)   10/17/17 101.5 kg (223 lb 12.3 oz)   09/22/17 103 kg (227 lb)   07/06/17 104.1 kg (229 lb 8 oz)   04/25/17 104.3 kg (230 lb)   03/21/17 104.3 kg (230 lb)   01/24/17 104.3 kg (230 lb)     Lab Results   Component Value Date    WBC 10.48 08/21/2018    HGB 14.5 08/21/2018    HCT 43.1 08/21/2018    MCV 97 08/21/2018     08/21/2018     CMP  Sodium   Date Value Ref Range Status   08/21/2018 141 136 - 145 mmol/L Final     Potassium   Date Value Ref Range Status   08/21/2018 4.4 3.5 - 5.1 mmol/L Final     Chloride   Date Value Ref Range Status   08/21/2018 106 95 - 110 mmol/L Final     CO2   Date Value Ref Range Status   08/21/2018 25 23 - 29 mmol/L Final     Glucose   Date Value Ref Range Status   08/21/2018 162 (H) 70 - 110 mg/dL Final     BUN, Bld   Date Value Ref Range Status   08/21/2018 8 6 - 20 mg/dL Final     Creatinine   Date Value Ref Range Status   08/21/2018 0.7 0.5 - 1.4 mg/dL Final     Calcium   Date Value Ref Range Status   08/21/2018 10.3 8.7 - 10.5 mg/dL Final     Total Protein   Date Value Ref Range Status   08/21/2018 6.9 6.0 - 8.4 g/dL Final     Albumin   Date Value Ref Range Status   08/21/2018 3.9 3.5 - 5.2 g/dL Final     Total Bilirubin   Date Value Ref Range Status   08/21/2018 0.4 0.1 - 1.0 mg/dL Final     Comment:     For infants and newborns, interpretation of results should be based  on gestational age, weight and in agreement with clinical  observations.  Premature Infant recommended reference ranges:  Up to 24 hours.............<8.0 mg/dL  Up to 48 hours............<12.0 mg/dL  3-5 days..................<15.0 mg/dL  6-29 days.................<15.0 mg/dL       Alkaline Phosphatase   Date Value Ref Range Status   08/21/2018 71 55 - 135 U/L Final     AST   Date Value Ref Range Status   08/21/2018 25 10 - 40 U/L Final     ALT   Date Value Ref Range Status   08/21/2018 39 10 - 44  "U/L Final     Anion Gap   Date Value Ref Range Status   08/21/2018 10 8 - 16 mmol/L Final     eGFR if    Date Value Ref Range Status   08/21/2018 >60.0 >60 mL/min/1.73 m^2 Final     eGFR if non    Date Value Ref Range Status   08/21/2018 >60.0 >60 mL/min/1.73 m^2 Final     Comment:     Calculation used to obtain the estimated glomerular filtration  rate (eGFR) is the CKD-EPI equation.        Lab Results   Component Value Date    TSH 3.956 05/31/2018     Reviewed prior medical records including radiology report of 6/12/13 pelvic ultrasound & endoscopy history (see surgical history).    9/26/17 EGD was reviewed and procedure report states:   " Findings:       The oropharynx was normal.       The proximal esophagus and mid esophagus were normal.       LA Grade A (one or more mucosal breaks less than 5 mm, not extending        between tops of 2 mucosal folds) esophagitis was found in the distal        esophagus.       The Z-line was regular and was found 36 cm from the incisors.       Some laxness of the lower esophageal sphincter was noted, but I did        not see a definite hiatal hernia.       Minimal inflammation characterized by erythema was found on the        greater curvature of the gastric antrum. Biopsies were taken with a        cold forceps for Helicobacter pylori testing using CLOtest. Biopsies        were taken with a cold forceps for histology.       The stomach was otherwise normal.       The examined duodenum was normal.  Impression:          - Normal oropharynx.                       - Normal proximal esophagus and mid esophagus.                       - LA Grade A reflux esophagitis.                       - Z-line regular, 36 cm from the incisors.                       - Chronic gastritis. Biopsied.                       - Normal stomach otherwise.                       - Normal examined duodenum.  Recommendation:      - Discharge patient to home.                       " "- Await pathology and CLOtest results.                       - Follow an antireflux regimen.                       - Use Prilosec (omeprazole) 40 mg PO daily.                       - Use Zantac (ranitidine) 300 mg PO nighty.                       - Call the G.I. clinic in 2 weeks for reports (if                        you haven't heard from us sooner) [phone].                       - Return to GI clinic in 6-8 weeks. ".  Biopsy results:   "FINAL PATHOLOGIC DIAGNOSIS  BIOPSIES OF GREATER CURVATURE OF THE STOMACH:  MODEST NONSPECIFIC CHRONIC INFLAMMATION"  Katherine test negative    5/16/13 Colonoscopy was reviewed and procedure report states:   " Findings:       The perianal and digital rectal examinations were normal. Pertinent        negatives include normal sphincter tone and no palpable rectal        lesions. Internal hemorrhoids were found during retroflexion and        were small. The rectum and recto-sigmoid colon appeared normal.        Multiple small and large-mouthed diverticula were found in the        sigmoid colon. The sigmoid colon was moderately tortuous. The colon        was also moderately redundant. The exam was otherwise without        abnormality.. The terminal ileum appeared normal.  Impression:          - Diverticulosis in the sigmoid colon.                       - Tortuous colon.                       - Irritable bowel syndrome?.                       - Internal hemorrhoids.                       - Redundant colon.                       - The examination was otherwise normal.                       - The examined portion of the ileum was normal.  Recommendation:      - Discharge patient to home.                       - High fiber diet.                       - Take a PROBIOTIC, such as a carton of ACTIVIA                        Yogurt or DANNON Greek Yogurt, or similar; or                        tablets of Align or Culturelle or Jyoti-Q, every day                        for a month.                   " "    - Use Bentyl (dicyclomine) 20 mg PO QID 30 min AC                        and HS PRN.                       - Return to referring physician, Dr. Coleman, as                        previously scheduled.                       - Repeat colonoscopy in 5-6 years for surveillance. ".    Objective:      Physical Exam   Constitutional: She is oriented to person, place, and time. She appears well-developed and well-nourished. No distress.   HENT:   Mouth/Throat: Oropharynx is clear and moist and mucous membranes are normal. No oral lesions. No oropharyngeal exudate.   Eyes: Conjunctivae are normal. Pupils are equal, round, and reactive to light. No scleral icterus.   Cardiovascular: Normal rate.   Pulmonary/Chest: Effort normal and breath sounds normal. No respiratory distress. She has no wheezes.   Abdominal: Soft. Normal appearance and bowel sounds are normal. She exhibits no distension, no abdominal bruit and no mass. There is no hepatosplenomegaly. There is no tenderness. There is no rigidity, no rebound, no guarding, no tenderness at McBurney's point and negative Adler's sign. No hernia.   Neurological: She is alert and oriented to person, place, and time.   Skin: Skin is warm and dry. No rash noted. She is not diaphoretic. No erythema. No pallor.   Non-jaundiced   Psychiatric: She has a normal mood and affect. Her behavior is normal. Judgment and thought content normal.   Nursing note and vitals reviewed.      Assessment:       1. Diarrhea, unspecified type    2. History of colon polyps    3. History of diabetes mellitus    4. Change in bowel habits    5. Lower abdominal pain        Plan:       Diarrhea, unspecified type  -     Stool Exam-Ova,Cysts,Parasites; Future; Expected date: 09/20/2018  -     Fecal fat, qualitative; Future; Expected date: 09/20/2018  -     Giardia / Cryptosporidum, EIA; Future; Expected date: 09/20/2018  -     Occult blood x 1, stool; Future; Expected date: 09/20/2018  -     pH, stool; " Future; Expected date: 09/20/2018  -     Rotavirus antigen, stool; Future; Expected date: 09/20/2018  -     WBC, Stool; Future; Expected date: 09/20/2018  -     Stool culture; Future; Expected date: 09/20/2018  -     Clostridium difficile EIA; Future; Expected date: 09/20/2018  -     X-Ray Abdomen Flat And Erect; Future; Expected date: 09/20/2018  - schedule Colonoscopy to be done in 2-4 weeks, discussed procedure with the patient, patient verbalized understanding  - recommended OTC probiotic, such as Align or Culturelle, as directed  - avoid lactose  - Possible CT scan pending results of testing and if symptoms persist    History of colon polyps  - schedule Colonoscopy to be done in 2-4 weeks, discussed procedure with the patient, patient verbalized understanding    History of diabetes mellitus  - discussed with patient that certain medications, such as metformin, may be contributing to symptoms. I recommend follow-up with provider who manages medication to discuss about possible alternative therapy, patient verbalized understanding    Change in bowel habits  - schedule Colonoscopy to be done in 2-4 weeks, discussed procedure with the patient, patient verbalized understanding    Lower abdominal pain  -     X-Ray Abdomen Flat And Erect; Future; Expected date: 09/20/2018  - Possible CT scan pending results of testing and if symptoms persist    Follow-up in about 1 month (around 10/20/2018), or if symptoms worsen or fail to improve.      If no improvement in symptoms or symptoms worsen, call/follow-up at clinic or go to ER.

## 2018-09-20 NOTE — PATIENT INSTRUCTIONS

## 2018-10-01 RX ORDER — LISINOPRIL 40 MG/1
TABLET ORAL
Qty: 90 TABLET | Refills: 0 | Status: SHIPPED | OUTPATIENT
Start: 2018-10-01 | End: 2018-12-29 | Stop reason: SDUPTHER

## 2018-10-02 RX ORDER — METFORMIN HYDROCHLORIDE 500 MG/1
TABLET ORAL
Qty: 360 TABLET | Refills: 0 | Status: SHIPPED | OUTPATIENT
Start: 2018-10-02 | End: 2019-01-02 | Stop reason: SDUPTHER

## 2018-10-02 RX ORDER — ALPRAZOLAM 0.5 MG/1
0.5 TABLET ORAL 4 TIMES DAILY PRN
Qty: 100 TABLET | Refills: 0 | Status: SHIPPED | OUTPATIENT
Start: 2018-10-02 | End: 2018-10-23 | Stop reason: SDUPTHER

## 2018-10-02 NOTE — TELEPHONE ENCOUNTER
"----- Message from Pernell Hendrickson sent at 10/2/2018 12:34 PM CDT -----  Contact: Self [phone]  RX request - refill or new RX.  Is this a refill or new RX:  refill  RX name and strength: ALPRAZolam (XANAX) 0.5 MG tablet  Directions:   Is this a 30 day or 90 day RX:  90 day  Local pharmacy or mail order pharmacy:    Pharmacy name and phone # (DON'T enter "on file" or "in chart"): [facility] [address] [place] [address] [email or web address] [address] AT [facility] [address] [phone] (Phone)  [phone] (Fax)  Comments:          "

## 2018-10-10 ENCOUNTER — LAB VISIT (OUTPATIENT)
Dept: LAB | Facility: HOSPITAL | Age: 58
End: 2018-10-10
Attending: NURSE PRACTITIONER
Payer: COMMERCIAL

## 2018-10-10 ENCOUNTER — TELEPHONE (OUTPATIENT)
Dept: GASTROENTEROLOGY | Facility: CLINIC | Age: 58
End: 2018-10-10

## 2018-10-10 DIAGNOSIS — R19.7 DIARRHEA, UNSPECIFIED TYPE: ICD-10-CM

## 2018-10-10 PROCEDURE — 89125 SPECIMEN FAT STAIN: CPT

## 2018-10-10 PROCEDURE — 87209 SMEAR COMPLEX STAIN: CPT

## 2018-10-10 PROCEDURE — 87324 CLOSTRIDIUM AG IA: CPT

## 2018-10-10 PROCEDURE — 89055 LEUKOCYTE ASSESSMENT FECAL: CPT

## 2018-10-10 PROCEDURE — 87045 FECES CULTURE AEROBIC BACT: CPT

## 2018-10-10 PROCEDURE — 82272 OCCULT BLD FECES 1-3 TESTS: CPT

## 2018-10-10 PROCEDURE — 87425 ROTAVIRUS AG IA: CPT

## 2018-10-10 PROCEDURE — 87427 SHIGA-LIKE TOXIN AG IA: CPT

## 2018-10-10 PROCEDURE — 83986 ASSAY PH BODY FLUID NOS: CPT

## 2018-10-10 PROCEDURE — 87046 STOOL CULTR AEROBIC BACT EA: CPT

## 2018-10-10 PROCEDURE — 87328 CRYPTOSPORIDIUM AG IA: CPT

## 2018-10-10 NOTE — TELEPHONE ENCOUNTER
----- Message from Adalgisa Almeida sent at 10/10/2018  4:15 PM CDT -----  Contact: self  Patient want be able to bring stool sample today how long can she keep stool before she bring it please call back at [phone] (home)

## 2018-10-11 LAB
C DIFF GDH STL QL: NEGATIVE
C DIFF TOX A+B STL QL IA: NEGATIVE
CRYPTOSP AG STL QL IA: NEGATIVE
FAT STL SUDAN IV STN: NORMAL
G LAMBLIA AG STL QL IA: NEGATIVE
O+P STL TRI STN: NORMAL
OB PNL STL: NEGATIVE
PH STL: NORMAL [PH]
RV AG STL QL IA.RAPID: NEGATIVE
WBC #/AREA STL HPF: NORMAL /[HPF]

## 2018-10-12 LAB
E COLI SXT1 STL QL IA: NEGATIVE
E COLI SXT2 STL QL IA: NEGATIVE

## 2018-10-15 LAB — BACTERIA STL CULT: NORMAL

## 2018-10-16 ENCOUNTER — TELEPHONE (OUTPATIENT)
Dept: GASTROENTEROLOGY | Facility: CLINIC | Age: 58
End: 2018-10-16

## 2018-10-16 ENCOUNTER — PATIENT MESSAGE (OUTPATIENT)
Dept: GASTROENTEROLOGY | Facility: CLINIC | Age: 58
End: 2018-10-16

## 2018-10-16 DIAGNOSIS — R19.5 YEAST IN STOOL: Primary | ICD-10-CM

## 2018-10-16 RX ORDER — FLUCONAZOLE 150 MG/1
150 TABLET ORAL DAILY
Qty: 1 TABLET | Refills: 0 | Status: SHIPPED | OUTPATIENT
Start: 2018-10-16 | End: 2018-10-17

## 2018-10-16 NOTE — TELEPHONE ENCOUNTER
Please call to inform & review the results with the patient- stool studies showed occasional yeast in stool; otherwise negative/normal findings.  - recommend OTC probiotic, such as Culturelle, as directed  I sent in a prescription for diflucan as well. Continue with previous recommendations.    If no improvement in symptoms or symptoms worsen, call/follow-up at clinic or go to ER.  Please release results to patient's mychart once you have discussed results and recommendations with patient.  Thanks,  Jeannie HOGAN

## 2018-10-17 NOTE — TELEPHONE ENCOUNTER
----- Message from Gopal Johnston sent at 10/17/2018 10:40 AM CDT -----  Contact: same  Patient called in and stated she missed a call from office and the only she can think is that it is the results from her specimen she dropped off on 10/10/18 to the lab at [address]    Patient call back number is [phone]

## 2018-10-23 RX ORDER — ALPRAZOLAM 0.5 MG/1
TABLET ORAL
Qty: 100 TABLET | Refills: 0 | Status: SHIPPED | OUTPATIENT
Start: 2018-10-23 | End: 2018-10-24 | Stop reason: SDUPTHER

## 2018-10-24 RX ORDER — ALPRAZOLAM 0.5 MG/1
0.5 TABLET ORAL 4 TIMES DAILY PRN
Qty: 100 TABLET | Refills: 0 | Status: SHIPPED | OUTPATIENT
Start: 2018-10-24 | End: 2018-12-10 | Stop reason: SDUPTHER

## 2018-10-24 NOTE — TELEPHONE ENCOUNTER
"----- Message from Regine Valdez sent at 10/24/2018 11:46 AM CDT -----  Contact: pt [phone]  RX request - refill or new RX.  Is this a refill or new RX:  Refill   RX name and strength: ALPRAZolam (XANAX) 0.5 MG tablet  Directions:   Is this a 30 day or 90 day RX:    Local pharmacy or mail order pharmacy:  Local   Pharmacy name and phone # (DON'T enter "on file" or "in chart"): [facility] [address] [place] [address] [employer] [address] AT [facility] [address] [phone] (Phone)  [phone] (Fax)      Comments:        "

## 2018-10-26 ENCOUNTER — TELEPHONE (OUTPATIENT)
Dept: INTERNAL MEDICINE | Facility: CLINIC | Age: 58
End: 2018-10-26

## 2018-10-26 NOTE — TELEPHONE ENCOUNTER
----- Message from Jaci Pascual sent at 10/26/2018 10:16 AM CDT -----  Contact: [phone]  Romi please Call the ALPRAZolam (XANAX) 0.5 MG tablet  to [facility]  [phone].

## 2018-10-26 NOTE — TELEPHONE ENCOUNTER
----- Message from Jaci Pascual sent at 10/26/2018  9:15 AM CDT -----  Contact: Call [phone]  Patient would like to know why her ALPRAZolam (XANAX) 0.5 MG tab was denied?

## 2018-10-30 NOTE — H&P
History & Physical - Short Stay  Gastroenterology      SUBJECTIVE:     Procedure: Colonoscopy    Chief Complaint/Indication for Procedure: Screening    History of Present Illness:  Diarrhea    Office Visit     9/20/2018  [place] - Gastroenterology      MARINO Alvarado   Gastroenterology   Diarrhea, unspecified type +4 more   Dx   Diarrhea     Reason for Visit    Progress Notes        Subjective:       Patient ID: Carmen Hernandez is a 58 y.o. female Body mass index is 37.24 kg/m².     Chief Complaint: Diarrhea (for 38 days)     This patient is new to me.  Established with Dr. Tom Calzada    The current episode started more than 1 month ago (started about 38 days ago after seeing dermatology). The problem occurs 2 to 4 times per day (mostly in the morning). The problem has been gradually improving. The stool consistency is described as watery (to soft pasty). The patient states that diarrhea does not awaken her from sleep. Associated symptoms include abdominal pain (tenderness to lower abdomen, denies currently), bloating (occasional) and weight loss (lost 9 lbs over the past year relates to death of her mother). Pertinent negatives include no chills, coughing, fever or vomiting. Associated symptoms comments: Occasional flatulence. Risk factors include recent antibiotic use (doxycycline recently for rosacea, metformin use been on for years; denies recent hospitalization, ill contacts, foreign travel, or suspect food intake). She has tried change of diet and increased fluids for the symptoms. Her past medical history is significant for irritable bowel syndrome. There is no history of inflammatory bowel disease.      Review of Systems   Constitutional: Positive for weight loss (lost 9 lbs over the past year relates to death of her mother). Negative for appetite change, chills, fatigue and fever.      Gastrointestinal: Positive for abdominal pain (tenderness to lower abdomen, denies currently),  "bloating (occasional) and diarrhea. Negative for anal bleeding, blood in stool, constipation (history of constipation, was using miralax, reports change in bowel habits), nausea, rectal pain and vomiting.     Review of Systems   Constitutional: Positive for weight loss (lost 9 lbs over the past year relates to death of her mother). Negative for appetite change, chills, fatigue and fever.   HENT: Negative for sore throat and trouble swallowing.    Respiratory: Negative for cough, choking and shortness of breath.    Cardiovascular: Negative for chest pain.   Gastrointestinal: Positive for abdominal pain (tenderness to lower abdomen, denies currently), bloating (occasional) and diarrhea. Negative for anal bleeding, blood in stool, constipation (history of constipation, was using miralax, reports change in bowel habits), nausea, rectal pain and vomiting.     5/16/13 Colonoscopy was reviewed and procedure report states:   " Findings:       The perianal and digital rectal examinations were normal. Pertinent        negatives include normal sphincter tone and no palpable rectal        lesions. Internal hemorrhoids were found during retroflexion and        were small. The rectum and recto-sigmoid colon appeared normal.        Multiple small and large-mouthed diverticula were found in the        sigmoid colon. The sigmoid colon was moderately tortuous. The colon        was also moderately redundant. The exam was otherwise without        abnormality.. The terminal ileum appeared normal.  Impression:          - Diverticulosis in the sigmoid colon.                       - Tortuous colon.                       - Irritable bowel syndrome?.                       - Internal hemorrhoids.                       - Redundant colon.                       - The examination was otherwise normal.                       - The examined portion of the ileum was normal.  Recommendation:      - Discharge patient to home.                       - " "High fiber diet.                       - Take a PROBIOTIC, such as a carton of ACTIVIA                        Yogurt or DANNON Greek Yogurt, or similar; or                        tablets of Align or Culturelle or Jyoti-Q, every day                        for a month.                       - Use Bentyl (dicyclomine) 20 mg PO QID 30 min AC                        and HS PRN.                       - Return to referring physician, Dr. Coleman, as                        previously scheduled.                       - Repeat colonoscopy in 5-6 years for surveillance. ".    Assessment:       1. Diarrhea, unspecified type    2. History of colon polyps    3. History of diabetes mellitus    4. Change in bowel habits    5. Lower abdominal pain        Plan:       Diarrhea, unspecified type  -     Stool Exam-Ova,Cysts,Parasites; Future; Expected date: 09/20/2018  -     Fecal fat, qualitative; Future; Expected date: 09/20/2018  -     Giardia / Cryptosporidum, EIA; Future; Expected date: 09/20/2018  -     Occult blood x 1, stool; Future; Expected date: 09/20/2018  -     pH, stool; Future; Expected date: 09/20/2018  -     Rotavirus antigen, stool; Future; Expected date: 09/20/2018  -     WBC, Stool; Future; Expected date: 09/20/2018  -     Stool culture; Future; Expected date: 09/20/2018  -     Clostridium difficile EIA; Future; Expected date: 09/20/2018  -     X-Ray Abdomen Flat And Erect; Future; Expected date: 09/20/2018  - schedule Colonoscopy to be done in 2-4 weeks, discussed procedure with the patient, patient verbalized understanding  - recommended OTC probiotic, such as Align or Culturelle, as directed  - avoid lactose  - Possible CT scan pending results of testing and if symptoms persist     History of colon polyps  - schedule Colonoscopy to be done in 2-4 weeks, discussed procedure with the patient, patient verbalized understanding     History of diabetes mellitus  - discussed with patient that certain medications, such as " metformin, may be contributing to symptoms. I recommend follow-up with provider who manages medication to discuss about possible alternative therapy, patient verbalized understanding     Change in bowel habits  - schedule Colonoscopy to be done in 2-4 weeks, discussed procedure with the patient, patient verbalized understanding     Lower abdominal pain  -     X-Ray Abdomen Flat And Erect; Future; Expected date: 09/20/2018  - Possible CT scan pending results of testing and if symptoms persist     Follow-up in about 1 month (around 10/20/2018), or if symptoms worsen or fail to improve.                  PTA Medications   Medication Sig    ALPRAZolam (XANAX) 0.5 MG tablet Take 1 tablet (0.5 mg total) by mouth 4 (four) times daily as needed.    lisinopril (PRINIVIL,ZESTRIL) 40 MG tablet TAKE 1 TABLET BY MOUTH EVERY DAY    metFORMIN (GLUCOPHAGE) 500 MG tablet TAKE 2 TABLETS BY MOUTH TWICE DAILY    pravastatin (PRAVACHOL) 40 MG tablet TAKE 1 TABLET BY MOUTH EVERY DAY    SITagliptin (JANUVIA) 50 MG Tab Take 1 tablet (50 mg total) by mouth once daily.    alcohol swabs (BD ALCOHOL SWABS) PadM USE TO CHECK BLOOD GLUCOSE TWICE DAILY    fluocinonide (LIDEX) 0.05 % external solution Apply topically 2 (two) times daily. Scalp, ears    fluocinonide 0.05% (LIDEX) 0.05 % cream Apply topically 2 (two) times daily. legs    ONETOUCH DELICA LANCETS 33 gauge Misc USE TO TEST BLOOD GLUCOSE TWICE DAILY    ONETOUCH VERIO Strp USE TO TEST BLOOD SUGAR TWICE DAILY    ONETOUCH VERIO SYNC kit USE TO TEST BLOOD GLUOCSE TWICE DAILY       Review of patient's allergies indicates:   Allergen Reactions    Demerol [meperidine] Hives    Iodinated contrast- oral and iv dye Hives    Bactrim [sulfamethoxazole-trimethoprim] Other (See Comments)     Other reaction(s): blotchy skin    Ciprofloxacin Other (See Comments)     Other reaction(s): blotchy skin    Erythromycin Other (See Comments)     tachycardia        Past Medical History:    Diagnosis Date    Anxiety     Bilateral dry eyes     uses atears//    Cataract     Diabetes mellitus     LBSL 128 today---fluctuates    Diverticulosis     GERD (gastroesophageal reflux disease)     Hyperlipidemia     Hypertension     Irritable bowel syndrome     Nephrolithiasis     Severe obesity (BMI 35.0-39.9) with comorbidity      Past Surgical History:   Procedure Laterality Date    COLONOSCOPY  2013    PAM.   Diverticulosis in the sigmoid colon.  Tortuous colon.  Irritable bowel syndrome?.  Internal hemorrhoids.  Redundant colon. repeat in 5-6 years    COLONOSCOPY N/A 2013    [facility]formed by [facility]    COLONOSCOPY W/ POLYPECTOMY  2009  Rohan SPENCER.   One 4 mm polyp in the transverse colon.  HYPERPLASTIC POLYP.    Diverticulosis sigmoid colon.    Tortuous colon.   Granularity hepatic flexure.     CYSTOSCOPY W/ STONE MANIPULATION      ENDOMETRIAL BIOPSY  X2    ESOPHAGOGASTRODUODENOSCOPY (EGD) N/A 2017    Per[facility] by [facility] at [facility]    HYSTERECTOMY      OOPHORECTOMY      UPPER GASTROINTESTINAL ENDOSCOPY  2017    Dr. Santos     Family History   Problem Relation Age of Onset    Cataracts Mother     Heart disease Mother     Hypertension Mother     Diabetes Father     Hypertension Father     Diabetes Brother     Hypertension Brother     Macular degeneration Neg Hx     Retinal detachment Neg Hx     Strabismus Neg Hx     Stroke Neg Hx     Thyroid disease Neg Hx     Glaucoma Neg Hx     Cancer Neg Hx     Amblyopia Neg Hx     Blindness Neg Hx     Breast cancer Neg Hx     Colon cancer Neg Hx     Colon polyps Neg Hx     Crohn's disease Neg Hx     Ulcerative colitis Neg Hx     Stomach cancer Neg Hx     Esophageal cancer Neg Hx     Celiac disease Neg Hx      Social History     Tobacco Use    Smoking status: Former Smoker     Last attempt to quit: 2013     Years since quittin.2     "Smokeless tobacco: Never Used   Substance Use Topics    Alcohol use: No    Drug use: No         OBJECTIVE:     Vital Signs (Most Recent)  Temp: 97.1 °F (36.2 °C) (10/31/18 0908)  Pulse: 102 (10/31/18 0908)  Resp: 18 (10/31/18 0908)  BP: (!) 141/68 (10/31/18 0908)  SpO2: 99 % (10/31/18 0908)    Physical Exam:  : Ht 5' 4" (1.626 m)   Wt 98.4 kg (216 lb 14.9 oz)   BMI 37.24 kg/m²     GENERAL:  Comfortable, in no acute distress.                                 HEENT EXAM:  Nonicteric.  No adenopathy.  Oropharynx is clear.      NECK:  Supple.                                                               LUNGS:  Clear.                                                               CARDIAC:  Regular rate and rhythm.  S1, S2.  No murmur.                 ABDOMEN:  Obese.  Soft, positive bowel sounds, nontender.  No hepatosplenomegaly or masses.  No rebound or guarding.                  EXTREMITIES:  No edema.     MENTAL STATUS:  Alert and oriented.    ASSESSMENT/PLAN:     Assessment: Diarrhea    Plan: Colonoscopy    Anesthesia Plan:   MAC / General Anaesthesia    ASA Grade: ASA 2 - Patient with mild systemic disease with no functional limitations    MALLAMPATI SCORE: II (hard and soft palate, upper portion of tonsils anduvula visible)    "

## 2018-10-31 ENCOUNTER — HOSPITAL ENCOUNTER (OUTPATIENT)
Facility: HOSPITAL | Age: 58
Discharge: HOME | End: 2018-10-31
Attending: INTERNAL MEDICINE | Admitting: INTERNAL MEDICINE
Payer: COMMERCIAL

## 2018-10-31 ENCOUNTER — ANESTHESIA (OUTPATIENT)
Dept: ENDOSCOPY | Facility: HOSPITAL | Age: 58
End: 2018-10-31
Payer: COMMERCIAL

## 2018-10-31 ENCOUNTER — ANESTHESIA EVENT (OUTPATIENT)
Dept: ENDOSCOPY | Facility: HOSPITAL | Age: 58
End: 2018-10-31
Payer: COMMERCIAL

## 2018-10-31 VITALS
TEMPERATURE: 97 F | RESPIRATION RATE: 13 BRPM | DIASTOLIC BLOOD PRESSURE: 82 MMHG | OXYGEN SATURATION: 99 % | SYSTOLIC BLOOD PRESSURE: 167 MMHG | WEIGHT: 218 LBS | BODY MASS INDEX: 37.22 KG/M2 | HEIGHT: 64 IN | HEART RATE: 97 BPM

## 2018-10-31 DIAGNOSIS — R19.7 DIARRHEA: ICD-10-CM

## 2018-10-31 LAB
C DIFF GDH STL QL: NEGATIVE
C DIFF TOX A+B STL QL IA: NEGATIVE
GLUCOSE SERPL-MCNC: 161 MG/DL (ref 70–110)

## 2018-10-31 PROCEDURE — 27201012 HC FORCEPS, HOT/COLD, DISP: Mod: PO | Performed by: INTERNAL MEDICINE

## 2018-10-31 PROCEDURE — 37000009 HC ANESTHESIA EA ADD 15 MINS: Mod: PO | Performed by: INTERNAL MEDICINE

## 2018-10-31 PROCEDURE — 45385 COLONOSCOPY W/LESION REMOVAL: CPT | Mod: PO | Performed by: INTERNAL MEDICINE

## 2018-10-31 PROCEDURE — 87046 STOOL CULTR AEROBIC BACT EA: CPT

## 2018-10-31 PROCEDURE — D9220A PRA ANESTHESIA: Mod: ,,, | Performed by: ANESTHESIOLOGY

## 2018-10-31 PROCEDURE — 45385 COLONOSCOPY W/LESION REMOVAL: CPT | Mod: ,,, | Performed by: INTERNAL MEDICINE

## 2018-10-31 PROCEDURE — 88305 TISSUE EXAM BY PATHOLOGIST: CPT | Mod: 26,,, | Performed by: PATHOLOGY

## 2018-10-31 PROCEDURE — 87427 SHIGA-LIKE TOXIN AG IA: CPT

## 2018-10-31 PROCEDURE — 63600175 PHARM REV CODE 636 W HCPCS: Mod: PO | Performed by: NURSE ANESTHETIST, CERTIFIED REGISTERED

## 2018-10-31 PROCEDURE — 87209 SMEAR COMPLEX STAIN: CPT

## 2018-10-31 PROCEDURE — 25000003 PHARM REV CODE 250: Mod: PO | Performed by: NURSE ANESTHETIST, CERTIFIED REGISTERED

## 2018-10-31 PROCEDURE — 37000008 HC ANESTHESIA 1ST 15 MINUTES: Mod: PO | Performed by: INTERNAL MEDICINE

## 2018-10-31 PROCEDURE — 87449 NOS EACH ORGANISM AG IA: CPT

## 2018-10-31 PROCEDURE — 45380 COLONOSCOPY AND BIOPSY: CPT | Mod: 59,,, | Performed by: INTERNAL MEDICINE

## 2018-10-31 PROCEDURE — 27201089 HC SNARE, DISP (ANY): Mod: PO | Performed by: INTERNAL MEDICINE

## 2018-10-31 PROCEDURE — 25000003 PHARM REV CODE 250: Mod: PO | Performed by: INTERNAL MEDICINE

## 2018-10-31 PROCEDURE — 87045 FECES CULTURE AEROBIC BACT: CPT

## 2018-10-31 PROCEDURE — 45380 COLONOSCOPY AND BIOPSY: CPT | Mod: PO | Performed by: INTERNAL MEDICINE

## 2018-10-31 PROCEDURE — 88305 TISSUE EXAM BY PATHOLOGIST: CPT | Performed by: PATHOLOGY

## 2018-10-31 RX ORDER — LIDOCAINE HCL/PF 100 MG/5ML
SYRINGE (ML) INTRAVENOUS
Status: DISCONTINUED | OUTPATIENT
Start: 2018-10-31 | End: 2018-10-31

## 2018-10-31 RX ORDER — SODIUM CHLORIDE, SODIUM LACTATE, POTASSIUM CHLORIDE, CALCIUM CHLORIDE 600; 310; 30; 20 MG/100ML; MG/100ML; MG/100ML; MG/100ML
INJECTION, SOLUTION INTRAVENOUS CONTINUOUS PRN
Status: DISCONTINUED | OUTPATIENT
Start: 2018-10-31 | End: 2018-10-31

## 2018-10-31 RX ORDER — PROPOFOL 10 MG/ML
VIAL (ML) INTRAVENOUS CONTINUOUS PRN
Status: DISCONTINUED | OUTPATIENT
Start: 2018-10-31 | End: 2018-10-31

## 2018-10-31 RX ORDER — SODIUM CHLORIDE, SODIUM LACTATE, POTASSIUM CHLORIDE, CALCIUM CHLORIDE 600; 310; 30; 20 MG/100ML; MG/100ML; MG/100ML; MG/100ML
INJECTION, SOLUTION INTRAVENOUS CONTINUOUS
Status: DISCONTINUED | OUTPATIENT
Start: 2018-10-31 | End: 2018-10-31 | Stop reason: HOSPADM

## 2018-10-31 RX ORDER — PROPOFOL 10 MG/ML
VIAL (ML) INTRAVENOUS
Status: DISCONTINUED | OUTPATIENT
Start: 2018-10-31 | End: 2018-10-31

## 2018-10-31 RX ORDER — SODIUM CHLORIDE 0.9 % (FLUSH) 0.9 %
3 SYRINGE (ML) INJECTION
Status: DISCONTINUED | OUTPATIENT
Start: 2018-10-31 | End: 2018-10-31 | Stop reason: HOSPADM

## 2018-10-31 RX ADMIN — PROPOFOL 150 MCG/KG/MIN: 10 INJECTION, EMULSION INTRAVENOUS at 09:10

## 2018-10-31 RX ADMIN — LIDOCAINE HYDROCHLORIDE 50 MG: 20 INJECTION, SOLUTION INTRAVENOUS at 09:10

## 2018-10-31 RX ADMIN — SODIUM CHLORIDE, SODIUM LACTATE, POTASSIUM CHLORIDE, AND CALCIUM CHLORIDE: .6; .31; .03; .02 INJECTION, SOLUTION INTRAVENOUS at 09:10

## 2018-10-31 RX ADMIN — PROPOFOL 100 MG: 10 INJECTION, EMULSION INTRAVENOUS at 09:10

## 2018-10-31 NOTE — DISCHARGE INSTRUCTIONS
Recovery After Procedural Sedation (Adult)  You have been given medicine by vein to make you sleep during your surgery. This may have included both a pain medicine and sleeping medicine. Most of the effects have worn off. But you may still have some drowsiness for the next 6 to 8 hours.  Home care  Follow these guidelines when you get home:  · For the next 8 hours, you should be watched by a responsible adult. This person should make sure your condition is not getting worse.  · Don't drink any alcohol for the next 24 hours.  · Don't drive, operate dangerous machinery, or make important business or personal decisions during the next 24 hours.  Note: Your healthcare provider may tell you not to take any medicine by mouth for pain or sleep in the next 4 hours. These medicines may react with the medicines you were given in the hospital. This could cause a much stronger response than usual.  Follow-up care  Follow up with your healthcare provider if you are not alert and back to your usual level of activity within 12 hours.  When to seek medical advice  Call your healthcare provider right away if any of these occur:  · Drowsiness gets worse  · Weakness or dizziness gets worse  · Repeated vomiting  · You can't be awakened   Date Last Reviewed: 10/18/2016  © 9050-6487 [employer]. [address] [place] [address], [place] [address]. All rights reserved. This information is not intended as a substitute for professional medical care. Always follow your healthcare professional's instructions.        PROBIOTICS:  Now that your colon is so cleaned out, now is a good time for a round of PROBIOTICS.  Eat a container of Greek Yogurt, such as OIKOS or CHOBANI,  Or Activia or Dannon    Greek Yogurt.    Or Take a similar Probiotic product such as Align or Culturelle or Jyoti-Q, every day for a month.                  (The products listed are non-prescription, but you may need to ask the pharmacist for their location.)  Repeat  this at least 5-6 times a year.        High-Fiber Diet  Fiber is in fruits, vegetables, cereals, and grains. Fiber passes through your body undigested. A high-fiber diet helps food move through your intestinal tract. The added bulk is helpful in preventing constipation. In people with diverticulosis, fiber helps clean out the pouches along the colon wall. It also prevents new pouches from forming. A high-fiber diet reduces the risk of colon cancer. It also lowers blood cholesterol and prevents high blood sugar in people with diabetes.    The fiber-rich foods listed below should be part of your diet. If you are not used to high-fiber foods, start with 1 or 2 foods from this list. Every 3 to 4 days add a new one to your diet. Do this until you are eating 4 high-fiber foods per day. This should give you 20 to 35 grams of fiber a day. It is also important to drink a lot of water when you are on this diet. You should have 6 to 8 glasses of water a day. Water makes the fiber swell and increases the benefit.  Foods high in dietary fiber  The following foods are high in dietary fiber:  · Breads. Breads made with 100% whole-wheat flour; nico, wheat, or rye crackers; whole-grain tortillas, bran muffins.  · Cereals. Whole-grain and bran cereals with bran (shredded wheat, wheat flakes, raisin bran, corn bran); oatmeal, rolled oats, granola, and brown rice.  · Fruits. Fresh fruits and their edible skins (pears, prunes, raisins, berries, apples, and apricots); bananas, citrus fruit, mangoes, pineapple; and prune juice.  · Nuts. Any nuts and seeds.  · Vegetables. Best served raw or lightly cooked. All types, especially: green peas, celery, eggplant, potatoes, spinach, broccoli, Viper sprouts, winter squash, carrots, cauliflower, soybeans, lentils, and fresh and dried beans of all kinds.  · Other. Popcorn, any spices.  Date Last Reviewed: 8/1/2016  © 4354-6468 [employer]. [address] [place] [address], [place]  [address]. All rights reserved. This information is not intended as a substitute for professional medical care. Always follow your healthcare professional's instructions.

## 2018-10-31 NOTE — ANESTHESIA POSTPROCEDURE EVALUATION
"Anesthesia Post Evaluation    Patient: Carmen Hernandez    Procedure(s) Performed: Procedure(s) (LRB):  COLONOSCOPY (N/A)    Final Anesthesia Type: general  Patient location during evaluation: PACU  Patient participation: Yes- Able to Participate  Level of consciousness: awake and alert  Post-procedure vital signs: reviewed and stable  Pain management: adequate  Airway patency: patent  PONV status at discharge: No PONV  Anesthetic complications: no      Cardiovascular status: hemodynamically stable  Respiratory status: unassisted and room air  Hydration status: euvolemic  Follow-up not needed.        Visit Vitals  BP (!) 167/82 (BP Location: Left arm, Patient Position: Sitting)   Pulse 97   Temp 36.2 °C (97.2 °F) (Skin)   Resp 13   Ht 5' 4" (1.626 m)   Wt 98.9 kg (218 lb)   SpO2 99%   Breastfeeding? No   BMI 37.42 kg/m²       Pain/Darshana Score: Pain Assessment Performed: Yes (10/31/2018 10:18 AM)  Presence of Pain: non-verbal indicators absent (10/31/2018 10:18 AM)  Darshana Score: 10 (10/31/2018 10:29 AM)        "

## 2018-10-31 NOTE — TRANSFER OF CARE
"Anesthesia Transfer of Care Note    Patient: Carmen Hernandez    Procedure(s) Performed: Procedure(s) (LRB):  COLONOSCOPY (N/A)    Patient location: PACU    Anesthesia Type: general    Transport from OR: Transported from OR on room air with adequate spontaneous ventilation    Post pain: adequate analgesia    Post assessment: no apparent anesthetic complications    Post vital signs: stable    Level of consciousness: awake    Nausea/Vomiting: no nausea/vomiting    Complications: none    Transfer of care protocol was followed      Last vitals:   Visit Vitals  /77 (BP Location: Left arm, Patient Position: Lying)   Pulse 87   Temp 36.2 °C (97.1 °F) (Skin)   Resp 16   Ht 5' 4" (1.626 m)   Wt 98.9 kg (218 lb)   SpO2 95%   Breastfeeding? No   BMI 37.42 kg/m²     "

## 2018-10-31 NOTE — BRIEF OP NOTE
Discharge Note  Short Stay      SUMMARY     Admit Date: 10/31/2018    Attending Physician: Jr Santos Jr., MD     Discharge Physician: Jr Santos Jr., MD    Discharge Date: 10/31/2018 10:38 AM    Final Diagnosis: Diarrhea, unspecified type [R19.7]  Impression:          - One 2 mm polyp in the distal transverse colon,                        removed with a cold snare. Resected and retrieved.                       - Diverticulosis in the sigmoid colon.                       - Mild colonic spasm consistent with irritable bowel                        syndrome.                       - Non-bleeding internal hemorrhoids.                       - Redundant colon.                       - The examination was otherwise normal.                       - The examined portion of the ileum was normal.  Recommendation:      - Discharge patient to home.                       - Await pathology and culture results.                       - Repeat colonoscopy in 5 years for surveillance.                       - High fiber diet.                       - Use fiber, for example Citrucel, Fibercon, Konsyl                        or Metamucil.                       - Take a PROBIOTIC, such as a carton of GREEK YOGURT                        (Chobani or Oikos, or Activia or Dannon); or tablets                        of ALIGN or CULTURELLE or ESPERANZA-Q (all                        non-prescription), every day for a month.                       - Miralax 1 capful (17 grams) in 8 ounces of water                        PO daily PRN.                       - Call the [facility] clinic in 2 weeks for reports (if                        you haven't heard from us sooner) [phone].                       - Continue present medications.                       - Patient has a contact number available for                        emergencies. The signs and symptoms of potential                        delayed complications were discussed with the                         patient. Return to normal activities tomorrow.                        Written discharge instructions were provided to the                        patient.                       - Return to normal activities tomorrow.  Jr Santos MD  10/31/2018   Disposition: HOME OR SELF CARE    Patient Instructions:   Current Discharge Medication List      CONTINUE these medications which have NOT CHANGED    Details   ALPRAZolam (XANAX) 0.5 MG tablet Take 1 tablet (0.5 mg total) by mouth 4 (four) times daily as needed.  Qty: 100 tablet, Refills: 0      lisinopril (PRINIVIL,ZESTRIL) 40 MG tablet TAKE 1 TABLET BY MOUTH EVERY DAY  Qty: 90 tablet, Refills: 0      metFORMIN (GLUCOPHAGE) 500 MG tablet TAKE 2 TABLETS BY MOUTH TWICE DAILY  Qty: 360 tablet, Refills: 0      pravastatin (PRAVACHOL) 40 MG tablet TAKE 1 TABLET BY MOUTH EVERY DAY  Qty: 90 tablet, Refills: 3      SITagliptin (JANUVIA) 50 MG Tab Take 1 tablet (50 mg total) by mouth once daily.  Qty: 30 tablet, Refills: 6      alcohol swabs (BD ALCOHOL SWABS) PadM USE TO CHECK BLOOD GLUCOSE TWICE DAILY  Qty: 200 each, Refills: 0      fluocinonide (LIDEX) 0.05 % external solution Apply topically 2 (two) times daily. Scalp, ears  Qty: 60 mL, Refills: 0    Associated Diagnoses: Psoriasis vulgaris      fluocinonide 0.05% (LIDEX) 0.05 % cream Apply topically 2 (two) times daily. legs  Qty: 30 g, Refills: 1    Associated Diagnoses: Psoriasis vulgaris      ONETOUCH DELICA LANCETS 33 gauge Misc USE TO TEST BLOOD GLUCOSE TWICE DAILY  Qty: 200 each, Refills: 0      ONETOUCH VERIO Strp USE TO TEST BLOOD SUGAR TWICE DAILY  Qty: 200 strip, Refills: 6      ONETOUCH VERIO SYNC kit USE TO TEST BLOOD GLUOCSE TWICE DAILY  Qty: 1 each, Refills: 0             Discharge Procedure Orders (must include Diet, Follow-up, Activity)    Follow Up:  Follow up with PCP as per your routine.  Please follow a high fiber diet.  Activity as tolerated.    No driving day of procedure.    PROBIOTICS:  Now  that your colon is so cleaned out, now is a good time for a round of PROBIOTICS.  Eat a container of Greek Yogurt, such as OIKOS or CHOBANI,  Or Activia or Dannon    Greek Yogurt.    Or Take a similar Probiotic product such as Align or Culturelle or Jyoti-Q, every day for a month.                  (The products listed are non-prescription, but you may need to ask the pharmacist for their location.)  Repeat this at least 5-6 times a year.

## 2018-10-31 NOTE — ANESTHESIA PREPROCEDURE EVALUATION
10/31/2018  Carmen Hernandez is a 58 y.o., female.    Pre-op Assessment    I have reviewed the Patient Summary Reports.     I have reviewed the Nursing Notes.   I have reviewed the Medications.     Review of Systems  Anesthesia Hx:  No problems with previous Anesthesia    Social:  Non-Smoker    Cardiovascular:   Hypertension, well controlled Denies CAD.    Denies CABG/stent.   Denies Angina. hyperlipidemia    Pulmonary:  Pulmonary Normal    Renal/:   renal calculi    Hepatic/GI:   GERD    Neurological:  Neurology Normal    Endocrine:   Diabetes, well controlled, type 2    Psych:   anxiety          Physical Exam  General:  Obesity    Airway/Jaw/Neck:  Airway Findings: Mouth Opening: Normal Tongue: Normal  General Airway Assessment: Adult  Mallampati: III  Improves to II with phonation.  TM Distance: 4 - 6 cm       Chest/Lungs:  Chest/Lungs Findings: Clear to auscultation, Normal Respiratory Rate     Heart/Vascular:  Heart Findings: Rate: Normal  Rhythm: Regular Rhythm        Mental Status:  Mental Status Findings:  Cooperative, Alert and Oriented         Anesthesia Plan  Type of Anesthesia, risks & benefits discussed:  Anesthesia Type:  general  Patient's Preference:   Intra-op Monitoring Plan: standard ASA monitors  Intra-op Monitoring Plan Comments:   Post Op Pain Control Plan:   Post Op Pain Control Plan Comments:   Induction:   IV  Beta Blocker:  Patient is not currently on a Beta-Blocker (No further documentation required).       Informed Consent: Patient understands risks and agrees with Anesthesia plan.  Questions answered. Anesthesia consent signed with patient.  ASA Score: 3     Day of Surgery Review of History & Physical: I have interviewed and examined the patient. I have reviewed the patient's H&P dated: 3/21/13. There are no significant changes.  H&P update referred to the provider.          Ready For Surgery From Anesthesia Perspective.

## 2018-10-31 NOTE — [employer] PATIENT INSTRUCTIONS
Discharge Summary/Instructions for after Colonoscopy with   Biopsy/Polypectomy  Carmen Hernandez    Wednesday, October 31, 2018  Jr Santos MD  RESTRICTIONS ON ACTIVITY:  - Do not drive a car or operate machinery until the day after the procedure.      - The following day: return to full activity including work.  - For  3 days: No heavy lifting, straining or running.  - Diet: You can have solid foods, but no gassy foods (i.e. beans, broccoli,   cabbage, etc).  TREATMENT FOR COMMON SIDE EFFECTS:  - Mild abdominal pain and bloating or excessive gas: rest, eat lightly and   use a heating pad.  SYMPTOMS TO WATCH FOR AND REPORT TO YOUR PHYSICIAN:  1. Severe abdominal pain.  2. Fever within 24 hours after a procedure.  3. A large amount of rectal bleeding. (A small amount of blood from the   rectum is not serious, especially if hemorrhoids are present.  3.  Because air was put into your colon during the procedure, expelling   large amounts of air from your rectum is normal.  4.  You may not have a bowel movement for 1-3 days because of the   colonoscopy prep.  This is normal.  5.  Call immediately if you notice any of the following:   Chills and/or fever over 101   Persistent vomiting   Severe abdominal pain, other than gas cramps   Severe chest pain   Black, tarry stools   Any bleeding - exceeding one tablespoon  Your doctor recommends these additional instructions:  We are waiting for your pathology results.   Your physician has recommended a repeat colonoscopy in 5-6 years for   surveillance.   Eat a high fiber diet.   Take a fiber supplement, for example Citrucel, Fibercon, Konsyl or   Metamucil.   Take a PROBIOTIC, such as a carton of GREEK YOGURT (Chobani or Oikos,  or   Activia or Dannon);  or tablets of ALIGN or CULTURELLE or ESPERANZA-Q (all   non-prescription), every day for a month.   And repeat this 3-4 times a   year.  Take Miralax 1 capful (17 grams) in 8 ounces of water by mouth once a day as    needed.  None  If you have any questions or problems, please call your physician.  EMERGENCY PHONE NUMBER: [phone]  LAB RESULTS: Call in two (2) weeks for lab results, [phone]  ___________________________________________  Nurse Signature  ___________________________________________  Patient/Designated Responsible Party Signature  Jr Santos MD  10/31/2018 10:37:14 AM  This report has been verified and signed electronically.  [employer]

## 2018-11-01 LAB
E COLI SXT1 STL QL IA: NEGATIVE
E COLI SXT2 STL QL IA: NEGATIVE
O+P STL TRI STN: NORMAL

## 2018-11-04 LAB — BACTERIA STL CULT: NORMAL

## 2018-11-07 ENCOUNTER — TELEPHONE (OUTPATIENT)
Dept: GASTROENTEROLOGY | Facility: CLINIC | Age: 58
End: 2018-11-07

## 2018-11-07 NOTE — TELEPHONE ENCOUNTER
----- Message from Анна Shea sent at 11/7/2018  1:31 PM CST -----  Contact: Self  Type:  Test Results    Who Called:  patient  Name of Test (Lab/Mammo/Etc):  Colonoscopy   Date of Test:  10/31  Ordering Provider: Dr Santos  Where the test was performed:  [facility]  Best Call Back Number: [phone] (home)  Additional Information:  na

## 2018-11-12 ENCOUNTER — OFFICE VISIT (OUTPATIENT)
Dept: DERMATOLOGY | Facility: CLINIC | Age: 58
End: 2018-11-12
Payer: COMMERCIAL

## 2018-11-12 VITALS — WEIGHT: 218 LBS | HEIGHT: 64 IN | BODY MASS INDEX: 37.22 KG/M2

## 2018-11-12 DIAGNOSIS — L81.4 LENTIGINES: ICD-10-CM

## 2018-11-12 DIAGNOSIS — L82.1 SEBORRHEIC KERATOSES: Primary | ICD-10-CM

## 2018-11-12 DIAGNOSIS — L40.0 PSORIASIS VULGARIS: ICD-10-CM

## 2018-11-12 PROCEDURE — 99999 PR PBB SHADOW E&M-EST. PATIENT-LVL II: CPT | Mod: PBBFAC,,, | Performed by: DERMATOLOGY

## 2018-11-12 PROCEDURE — 99213 OFFICE O/P EST LOW 20 MIN: CPT | Mod: S$GLB,,, | Performed by: DERMATOLOGY

## 2018-11-12 RX ORDER — SITAGLIPTIN 50 MG/1
TABLET, FILM COATED ORAL
Refills: 6 | COMMUNITY
Start: 2018-11-01 | End: 2019-03-19

## 2018-11-12 NOTE — PROGRESS NOTES
Subjective:       Patient ID:  Carmen Hernandez is a 58 y.o. female who presents for   Chief Complaint   Patient presents with    Psoriasis     59 yo F presents for psoriasis follow up.  She had been using Lidex solution but stopped because it stings and she thinks it is too strong.  She is using Lidex cream on her ears and legs        Brother with possible h/o psoriasis    Past Medical History:  Diagnosis Date   Anxiety     Bilateral dry eyes      uses atears//   Cataract     Diabetes mellitus      LBSL 128 today---fluctuates   Hyperlipidemia     Hypertension     Nephrolithiasis     Severe obesity (BMI 35.0-39.9) with comorbidity                Review of Systems   Musculoskeletal: Negative for joint swelling and arthralgias.   Skin: Positive for itching, rash and dry skin. Negative for abscesses.   Hematologic/Lymphatic: Does not bruise/bleed easily.        Objective:    Physical Exam   Constitutional: She appears well-developed and well-nourished.   Neurological: She is alert and oriented to person, place, and time.   Psychiatric: She has a normal mood and affect.   Skin:   Areas Examined (abnormalities noted in diagram):   Scalp / Hair Palpated and Inspected  Head / Face Inspection Performed  Neck Inspection Performed  Chest / Axilla Inspection Performed  Abdomen Inspection Performed  Back Inspection Performed  RUE Inspected  LUE Inspection Performed  RLE Inspected  LLE Inspection Performed  Nails and Digits Inspection Performed                       Diagram Legend     Erythematous scaling macule/papule c/w actinic keratosis       Vascular papule c/w angioma      Pigmented verrucoid papule/plaque c/w seborrheic keratosis      Yellow umbilicated papule c/w sebaceous hyperplasia      Irregularly shaped tan macule c/w lentigo     1-2 mm smooth white papules consistent with Milia      Movable subcutaneous cyst with punctum c/w epidermal inclusion cyst      Subcutaneous movable cyst c/w pilar cyst       Firm pink to brown papule c/w dermatofibroma      Pedunculated fleshy papule(s) c/w skin tag(s)      Evenly pigmented macule c/w junctional nevus     Mildly variegated pigmented, slightly irregular-bordered macule c/w mildly atypical nevus      Flesh colored to evenly pigmented papule c/w intradermal nevus       Pink pearly papule/plaque c/w basal cell carcinoma      Erythematous hyperkeratotic cursted plaque c/w SCC      Surgical scar with no sign of skin cancer recurrence      Open and closed comedones      Inflammatory papules and pustules      Verrucoid papule consistent consistent with wart     Erythematous eczematous patches and plaques     Dystrophic onycholytic nail with subungual debris c/w onychomycosis     Umbilicated papule    Erythematous-base heme-crusted tan verrucoid plaque consistent with inflamed seborrheic keratosis     Erythematous Silvery Scaling Plaque c/w Psoriasis     See annotation      Assessment / Plan:        Seborrheic keratoses  These are benign inherited growths without a malignant potential. Reassurance given to patient. No treatment is necessary.     Psoriasis vulgaris  Pt would like to continue topical treatment for now  In patients with psoriasis, the prevalence rates of cardiovascular risk factors are increased, including hypertension, diabetes mellitus, dyslipidemia, obesity, and metabolic syndrome.  Continue to follow with PCP for mgmt of CV risk factors     -     fluocinonide 0.05% (LIDEX) 0.05 % cream; Apply topically 2 (two) times daily. legs  Dispense: 30 g; Refill: 1      Lentigines  These are benign hyperpigmented sun induced lesions. Daily sun protection will reduce the number of new lesions         Follow-up for Pt will call for follow up.

## 2018-11-13 ENCOUNTER — PATIENT MESSAGE (OUTPATIENT)
Dept: GASTROENTEROLOGY | Facility: CLINIC | Age: 58
End: 2018-11-13

## 2018-11-29 ENCOUNTER — LAB VISIT (OUTPATIENT)
Dept: LAB | Facility: HOSPITAL | Age: 58
End: 2018-11-29
Attending: INTERNAL MEDICINE
Payer: COMMERCIAL

## 2018-11-29 DIAGNOSIS — E11.9 DIABETES MELLITUS WITHOUT COMPLICATION: ICD-10-CM

## 2018-11-29 LAB
ANION GAP SERPL CALC-SCNC: 8 MMOL/L
BUN SERPL-MCNC: 9 MG/DL
CALCIUM SERPL-MCNC: 9.9 MG/DL
CHLORIDE SERPL-SCNC: 104 MMOL/L
CO2 SERPL-SCNC: 26 MMOL/L
CREAT SERPL-MCNC: 0.7 MG/DL
EST. GFR  (AFRICAN AMERICAN): >60 ML/MIN/1.73 M^2
EST. GFR  (NON AFRICAN AMERICAN): >60 ML/MIN/1.73 M^2
ESTIMATED AVG GLUCOSE: 157 MG/DL
GLUCOSE SERPL-MCNC: 176 MG/DL
HBA1C MFR BLD HPLC: 7.1 %
POTASSIUM SERPL-SCNC: 4.5 MMOL/L
SODIUM SERPL-SCNC: 138 MMOL/L

## 2018-11-29 PROCEDURE — 83036 HEMOGLOBIN GLYCOSYLATED A1C: CPT

## 2018-11-29 PROCEDURE — 80048 BASIC METABOLIC PNL TOTAL CA: CPT

## 2018-11-29 PROCEDURE — 36415 COLL VENOUS BLD VENIPUNCTURE: CPT | Mod: PO

## 2018-12-10 RX ORDER — ALPRAZOLAM 0.5 MG/1
TABLET ORAL
Qty: 100 TABLET | Refills: 0 | Status: SHIPPED | OUTPATIENT
Start: 2018-12-10 | End: 2019-01-04 | Stop reason: SDUPTHER

## 2018-12-11 ENCOUNTER — TELEPHONE (OUTPATIENT)
Dept: INTERNAL MEDICINE | Facility: CLINIC | Age: 58
End: 2018-12-11

## 2018-12-11 NOTE — TELEPHONE ENCOUNTER
"----- Message from Regine Kellogg sent at 12/11/2018  1:11 PM CST -----  Contact: pt [phone]  RX request - refill or new RX.  Is this a refill or new RX:  Refill   RX name and strength: ALPRAZolam (XANAX) 0.5 MG tablet  Directions:   Is this a 30 day or 90 day RX:    Local pharmacy or mail order pharmacy:  Local   Pharmacy name and phone # (DON'T enter "on file" or "in chart"): [facility] [address] [place] [address] [employer] [address] AT [facility] [address] [phone] (Phone)  [phone] (Fax)      Comments:        "

## 2018-12-31 RX ORDER — LISINOPRIL 40 MG/1
TABLET ORAL
Qty: 90 TABLET | Refills: 0 | Status: SHIPPED | OUTPATIENT
Start: 2018-12-31 | End: 2019-03-28 | Stop reason: SDUPTHER

## 2019-01-02 RX ORDER — METFORMIN HYDROCHLORIDE 500 MG/1
TABLET ORAL
Qty: 360 TABLET | Refills: 0 | Status: SHIPPED | OUTPATIENT
Start: 2019-01-02 | End: 2019-03-31 | Stop reason: SDUPTHER

## 2019-01-04 RX ORDER — ALPRAZOLAM 0.5 MG/1
TABLET ORAL
Qty: 100 TABLET | Refills: 0 | Status: SHIPPED | OUTPATIENT
Start: 2019-01-04 | End: 2019-01-26 | Stop reason: SDUPTHER

## 2019-01-08 ENCOUNTER — TELEPHONE (OUTPATIENT)
Dept: INTERNAL MEDICINE | Facility: CLINIC | Age: 59
End: 2019-01-08

## 2019-01-08 NOTE — TELEPHONE ENCOUNTER
----- Message from Pernell Hendrickson sent at 1/7/2019  9:33 AM CST -----  Contact: Self [phone]  Pt will like to speak to the nurse in regards to why did her mg change on ALPRAZolam (XANAX) 0.5 MG tablet.

## 2019-01-08 NOTE — TELEPHONE ENCOUNTER
Pt is aware that her rx is no different. She said she thinks the pharmacy messed up,and she is on the way there,and they have it straight

## 2019-01-10 ENCOUNTER — TELEPHONE (OUTPATIENT)
Dept: OBSTETRICS AND GYNECOLOGY | Facility: CLINIC | Age: 59
End: 2019-01-10

## 2019-01-10 DIAGNOSIS — Z12.31 VISIT FOR SCREENING MAMMOGRAM: Primary | ICD-10-CM

## 2019-01-10 NOTE — TELEPHONE ENCOUNTER
----- Message from Cherri Dang sent at 1/9/2019  5:05 PM CST -----  Contact: [facility]  ----- Message from [facility] System Message sent at 1/6/2019  6:19 AM CST -----    Appointment Request From: Carmen Hernandez    With Provider: soniya fan    Preferred Date Range: 2/1/2019 - 3/30/2019    Preferred Times: Any time    Reason for visit: Existing Patient    Comments:  annual mamogram

## 2019-01-28 RX ORDER — ALPRAZOLAM 0.5 MG/1
TABLET ORAL
Qty: 100 TABLET | Refills: 0 | Status: SHIPPED | OUTPATIENT
Start: 2019-01-28 | End: 2019-01-30 | Stop reason: SDUPTHER

## 2019-01-29 NOTE — TELEPHONE ENCOUNTER
"----- Message from Diya Reese sent at 1/29/2019 12:06 PM CST -----  Contact: [phone]  RX request - refill or new RX.  Is this a refill or new RX:  Recall  RX name and strength: ALPRAZolam (XANAX) 0.5 MG tablet  Directions:   Is this a 30 day or 90 day RX:    Local pharmacy or mail order pharmacy:  Local  Pharmacy name and phone # (DON'T enter "on file" or "in chart"):    [facility] [address] [place] [address] [address] AT [facility] [address] [phone] (Phone)  [phone] (Fax)  Comments:        "

## 2019-01-30 RX ORDER — ALPRAZOLAM 0.5 MG/1
TABLET ORAL
Qty: 100 TABLET | Refills: 0 | Status: SHIPPED | OUTPATIENT
Start: 2019-01-30 | End: 2019-03-12 | Stop reason: SDUPTHER

## 2019-02-05 ENCOUNTER — HOSPITAL ENCOUNTER (OUTPATIENT)
Dept: RADIOLOGY | Facility: HOSPITAL | Age: 59
Discharge: HOME | End: 2019-02-05
Attending: OBSTETRICS & GYNECOLOGY
Payer: COMMERCIAL

## 2019-02-05 DIAGNOSIS — Z12.31 VISIT FOR SCREENING MAMMOGRAM: ICD-10-CM

## 2019-02-05 PROCEDURE — 77067 SCR MAMMO BI INCL CAD: CPT | Mod: TC,PO

## 2019-02-05 PROCEDURE — 77067 MAMMO DIGITAL SCREENING BILAT WITH TOMOSYNTHESIS_CAD: ICD-10-PCS | Mod: 26,,, | Performed by: RADIOLOGY

## 2019-02-05 PROCEDURE — 77067 SCR MAMMO BI INCL CAD: CPT | Mod: 26,,, | Performed by: RADIOLOGY

## 2019-02-05 PROCEDURE — 77063 BREAST TOMOSYNTHESIS BI: CPT | Mod: 26,,, | Performed by: RADIOLOGY

## 2019-02-05 PROCEDURE — 77063 MAMMO DIGITAL SCREENING BILAT WITH TOMOSYNTHESIS_CAD: ICD-10-PCS | Mod: 26,,, | Performed by: RADIOLOGY

## 2019-03-13 RX ORDER — ALPRAZOLAM 0.25 MG/1
TABLET ORAL
Qty: 100 TABLET | Refills: 0 | Status: SHIPPED | OUTPATIENT
Start: 2019-03-13 | End: 2019-03-19 | Stop reason: DRUGHIGH

## 2019-03-13 RX ORDER — ALPRAZOLAM 0.5 MG/1
TABLET ORAL
Qty: 100 TABLET | Refills: 0 | Status: SHIPPED | OUTPATIENT
Start: 2019-03-13 | End: 2019-03-18 | Stop reason: SDUPTHER

## 2019-03-18 RX ORDER — ALPRAZOLAM 0.5 MG/1
0.5 TABLET ORAL 4 TIMES DAILY PRN
Qty: 100 TABLET | Refills: 0 | Status: SHIPPED | OUTPATIENT
Start: 2019-03-18 | End: 2019-04-15 | Stop reason: SDUPTHER

## 2019-03-19 ENCOUNTER — OFFICE VISIT (OUTPATIENT)
Dept: OTOLARYNGOLOGY | Facility: CLINIC | Age: 59
End: 2019-03-19
Payer: COMMERCIAL

## 2019-03-19 VITALS — HEIGHT: 64 IN | BODY MASS INDEX: 35.49 KG/M2 | WEIGHT: 207.88 LBS

## 2019-03-19 DIAGNOSIS — R07.0 THROAT DISCOMFORT: Primary | ICD-10-CM

## 2019-03-19 DIAGNOSIS — K21.9 LPRD (LARYNGOPHARYNGEAL REFLUX DISEASE): ICD-10-CM

## 2019-03-19 DIAGNOSIS — R13.10 DYSPHAGIA, UNSPECIFIED TYPE: ICD-10-CM

## 2019-03-19 DIAGNOSIS — K29.70 GASTRITIS WITHOUT BLEEDING, UNSPECIFIED CHRONICITY, UNSPECIFIED GASTRITIS TYPE: ICD-10-CM

## 2019-03-19 DIAGNOSIS — K20.90 ESOPHAGITIS: ICD-10-CM

## 2019-03-19 DIAGNOSIS — R49.0 DYSPHONIA: ICD-10-CM

## 2019-03-19 DIAGNOSIS — R09.A2 GLOBUS SENSATION: ICD-10-CM

## 2019-03-19 PROCEDURE — 99214 PR OFFICE/OUTPT VISIT, EST, LEVL IV, 30-39 MIN: ICD-10-PCS | Mod: 25,S$GLB,, | Performed by: NURSE PRACTITIONER

## 2019-03-19 PROCEDURE — 99214 OFFICE O/P EST MOD 30 MIN: CPT | Mod: 25,S$GLB,, | Performed by: NURSE PRACTITIONER

## 2019-03-19 PROCEDURE — 31575 PR LARYNGOSCOPY, FLEXIBLE; DIAGNOSTIC: ICD-10-PCS | Mod: S$GLB,,, | Performed by: NURSE PRACTITIONER

## 2019-03-19 PROCEDURE — 99999 PR PBB SHADOW E&M-EST. PATIENT-LVL IV: ICD-10-PCS | Mod: PBBFAC,,, | Performed by: NURSE PRACTITIONER

## 2019-03-19 PROCEDURE — 99999 PR PBB SHADOW E&M-EST. PATIENT-LVL IV: CPT | Mod: PBBFAC,,, | Performed by: NURSE PRACTITIONER

## 2019-03-19 PROCEDURE — 31575 DIAGNOSTIC LARYNGOSCOPY: CPT | Mod: S$GLB,,, | Performed by: NURSE PRACTITIONER

## 2019-03-19 RX ORDER — OMEPRAZOLE 40 MG/1
40 CAPSULE, DELAYED RELEASE ORAL
Qty: 30 CAPSULE | Refills: 11 | Status: SHIPPED | OUTPATIENT
Start: 2019-03-19 | End: 2020-03-03 | Stop reason: DRUGHIGH

## 2019-03-19 NOTE — PATIENT INSTRUCTIONS
Different reasons for recurrent sore throat:     1. Nasal allergies -- Typical constellation of symptoms seen with nasal allergies: itchy, red, watery eyes; itchy, red, watery nose; excessive sneezing; excessive stuffiness. Discuss with your primary care provider whether you should see an allergist or take daily allergy medications.     2. Silent reflux -- Typical constellation of symptoms seen with silent reflux: post-nasal drip sensation with absence of significant runny nose or nasal congestion, sensation of thick or too much mucus in the back of throat, raspy voice, frequent throat clearing, lump in the back of throat, frequent sore throats. Discuss with your primary care provider whether you should see a gastroenterologist or take daily reflux medications.     3. Sinus Infection -- Typical constellation of symptoms seen with acute bacterial sinus infection are:  Green-gold, foul-smelling, foul-tasting mucus from nose and throat, inability to breathe through nose, inability to smell or taste well, facial pain and swelling, dental pain, headaches around eyes, sore throat and productive cough. Sinus imaging may be needed to rule out infection if these symptoms are present.    4. Pharyngitis/Tonsillitis -- Typical constellation of symptoms seen with acute bacterial tonsillitis/pharyngitis are:  Smelly pus (exudate), very red inflamed throat, swollen lymph nodes, fever, general malaise, absence of cough. If these symptoms are present, you may need a throat swab.        How Acid Reflux Affects Your Throat    Do you have to clear your throat or cough often? Are you hoarse? Do you have trouble swallowing? If you have these or other throat symptoms, you may have acid reflux. This occurs when stomach acid flows back up and irritates your throat.  Why you have throat symptoms  There are muscles (esophageal sphincters) at both ends of the tube that carries food to your stomach (the esophagus). These muscles relax to let  "food pass. Then they tighten to keep stomach acid down. When the lower esophageal sphincter (LES) doesnt tighten enough, acid can flow back (reflux) from your stomach into your esophagus. This may cause heartburn. In some cases the upper esophageal sphincter (UES) also doesnt work well. Then acid can travel higher and enter your throat (pharynx). In many cases, this causes throat symptoms.  Common throat symptoms  · Need to clear your throat often  · Feeling like youre choking  · Long-term (chronic) cough  · Hoarseness  · Trouble swallowing  · Feel like you have a lump in your throat  · Sour or acid taste  · Sore throat that keeps coming back     LARYNGOPHARYNGEAL REFLUX  (SILENT OR ATYPICAL REFLUX)    If you have any of the following symptoms you may have laryngopharyngeal reflux (LPR):  hoarseness, thick or too much mucus, chronic throat pain/irritation, chronic throat clearing, chronic cough, especially cough that wake you up from sleep, chronic "postnasal drip" without the need to blow your nose.     Many people with LPR do not have symptoms of heartburn. Compared to the esophagus, the voice box and the back of the throat are significantly more sensitive to the effects of acid on surrounding tissue. Acid passing quickly through the esophagus does not have a chance to irritate the area for too long.  However acid that pools in the throat or voice box can cause prolonged irritation resulting in the symptoms of LPR. In patients known to have LPR, 71% reported hoarseness, 51% reported chronic cough, 47% reported sensation of thickness or lump in the back of the throat, 42% reported chronic throat clearing, and 35% reported trouble swallowing.     Another major symptom of LPR is "postnasal drip."  Patients are often told symptoms are due to abnormal nasal drainage or sinus infection; however this is rarely the cause of chronic throat irritation. For post nasal drip to cause the complaints described, signs and " "symptoms of an active nasal infection should be present.     Treatments for LPR include:  postural changes, weight reduction, diet modification, medication to reduce stomach acid and promote normal motility, and surgery to prevent reflux. Most patients will begin to notice some relief in her symptoms about 2 weeks after starting the medication; however it is generally recommended the medication should be continued for 2 months. If the symptoms completely resolve, the medication can then be tapered.  Some people will remain symptom free while others may have relapses which required treatment again.    Things you can do to prevent reflux include:  Do not smoke.  Smoking will cause reflux.  Avoid tight fitting clothes or belts around the waist.  Avoid vigorous exercise at least 2 hours before bedtime. Avoid eating at least 2 hours prior to bedtime.  In fact avoid eating your largest meal at night.  Weight loss.  For patient's with recent weight gain, shedding a few pounds is all that is required to improve reflux.  Avoid caffeine, cola beverages, citrus beverages, mints, alcoholic beverages, particularly at night, cheese, fried foods, spicy foods, eggs, and chocolate.  Sleep with the head of bed elevated at least 6 inches ("MedCline" wedge pillow).    Recommendations:    Take Nexium or Prilosec (PPI) every morning on an empty stomach (30-60 minutes before eating) 40 MG.   At bedtime take Zantac (H2-blocker) 300 mg.    After 4-8 weeks, with significant symptomatic improvement, you may begin weaning your reflux medications down:  Nexium or Prilosec 40 mg --> to 20 mg (over-the-counter strength).  Zantac 300 mg --> to 150 mg (over-the-counter stength).  Then continue to wean as symptoms allow.    See a Gastro doctor (GI) for refractory symptoms and continued management.        "

## 2019-03-19 NOTE — PROGRESS NOTES
Subjective:       Patient ID: Carmen Hernandez is a 59 y.o. female.    Chief Complaint: No chief complaint on file.    HPI   Patient was seen in 2017 for LPRD-related hoarseness and throat discomfort. Patient had EGD done on 9/26/17, which revealed: esophagitis in the distal esophagus. Some laxness of the lower esophageal sphincter was noted, but no definite hiatal hernia. Minimal inflammation characterized by erythema was found on the greater curvature of the gastric antrum. Path report consistent with modest non-specific chronic inflammation.   Patient is no longer taking anything for GERD. She stopped taking omeprazole because she was worried about long-term effects. She took omeprazole 4 times last week and believes it may have caused her upset stomach, though she is not sure because she also has irritable bowel syndrome.   Patient returns today for intermittent throat irritation, dysphagia, dysphonia, and globus sensation. Patient denies s/s of acute bacterial sinusitis:  No mucopus from nose or throat, no facial swelling/pain, no dental pain, no diminished olfaction/taste, no headaches around the eyes, no productive cough. Some clear rhinorrhea in the morning but otherwise patient denies significant allergic stigmata:  No itchy, red, watery eyes; no itchy, red, watery nose; no excessive sneezing or stuffiness.     Review of Systems   Constitutional: Negative.    HENT: Positive for rhinorrhea (in the morning), sore throat (intermittent, frequent), trouble swallowing and voice change.         Sensation of thick or too much mucus in the back of her throat   Eyes: Negative.    Respiratory: Negative.    Cardiovascular: Negative.    Gastrointestinal: Negative.    Musculoskeletal: Negative.    Skin: Negative.    Neurological: Negative.    Hematological: Negative.    Psychiatric/Behavioral: Negative.        Objective:      Physical Exam   Constitutional: She is oriented to person, place, and time. Vital signs  are normal. She appears well-developed and well-nourished. She is cooperative. She does not appear ill. No distress.   HENT:   Head: Normocephalic and atraumatic.   Right Ear: Hearing, tympanic membrane, external ear and ear canal normal. Tympanic membrane is not erythematous. No middle ear effusion.   Left Ear: Hearing, tympanic membrane, external ear and ear canal normal. Tympanic membrane is not erythematous.  No middle ear effusion.   Nose: Nose normal. No mucosal edema or rhinorrhea. Right sinus exhibits no maxillary sinus tenderness and no frontal sinus tenderness. Left sinus exhibits no maxillary sinus tenderness and no frontal sinus tenderness.   Mouth/Throat: Uvula is midline, oropharynx is clear and moist and mucous membranes are normal. Mucous membranes are not pale, not dry and not cyanotic. No oral lesions. No oropharyngeal exudate, posterior oropharyngeal edema or posterior oropharyngeal erythema.   Eyes: Conjunctivae, EOM and lids are normal. Pupils are equal, round, and reactive to light. Right eye exhibits no discharge. Left eye exhibits no discharge. No scleral icterus.   Neck: Trachea normal and normal range of motion. Neck supple. No tracheal deviation present. No thyroid mass and no thyromegaly present.   Cardiovascular: Normal rate.   Pulmonary/Chest: Effort normal. No stridor. No respiratory distress. She has no wheezes.   Musculoskeletal: Normal range of motion.   Lymphadenopathy:        Head (right side): No submental, no submandibular, no tonsillar, no preauricular and no posterior auricular adenopathy present.        Head (left side): No submental, no submandibular, no tonsillar, no preauricular and no posterior auricular adenopathy present.     She has no cervical adenopathy.        Right cervical: No superficial cervical and no posterior cervical adenopathy present.       Left cervical: No superficial cervical and no posterior cervical adenopathy present.   Neurological: She is alert and  oriented to person, place, and time. She has normal strength. Coordination and gait normal.   Skin: Skin is warm, dry and intact. No lesion and no rash noted. She is not diaphoretic. No cyanosis. No pallor.   Psychiatric: She has a normal mood and affect. Her speech is normal and behavior is normal. Judgment and thought content normal. Cognition and memory are normal.   Nursing note and vitals reviewed.      Procedure: Flexible laryngoscopy    In order to fully examine the upper aerodigestive tract, including the larynx, in a patient with a hyperactive gag reflex, and suboptimal visualization with indirect mirror exam,  flexible endoscopy is required.   After explaining the procedure and obtaining verbal consent, a timeout was performed with the patient's participation according to the universal protocol. Both nasal cavities were anesthetized with 4% Xylocaine spray mixed with Vaughn-Synephrine. The flexible laryngoscope  was inserted into the nasal cavity and advanced to visualize the nasal cavity, nasopharynx, the posterior oropharynx, hypopharynx, and the endolarynx with the  findings noted. The scope was removed and the procedure terminated. The patient tolerated this procedure well without apparent complication.     OVERALL FINDINGS  Nasopharynx - the torus is clear. There are no lesions of the posterior wall.   Oropharynx - no lesions of the tongue base. There is no obvious fullness or asymmetry.  Hypopharynx - there are no lesions of the pyriform sinuses or postcricoid region   Larynx - there are no lesions of the supraglottic or glottic larynx.  Vocal fold mobility is normal.     SPECIFIC FINDINGS  Adenoid tissue - normal   Nasopharynx & eustachian tube orifices - normal   Posterior pharyngeal wall - normal   Base of tongue - normal   Epiglottis - normal   Valleculae - normal   Pyriform sinuses - normal   False vocal cords - normal   True vocal cords - normal  Arytenoids - normal   Interarytenoid space - marked  edema and erythema  Larynx    Larynx    Assessment:     LPRD manifested as recurrent throat discomfort, dysphonia, globus sensation      Plan:       Advised/Cautioned: The results of today's ENT exam and flexible endoscopy were detailed to the patient and her questions were answered. Patient education centered around GERD, known exacerbants and contemporary treatment options. Laryngoscope photos were given to the patient. Handouts given on LPRD and GERD were given to the patient. After review of these, patient elected to be placed on PPI 40 mg QAM on an empty stomach for the next 6-8 weeks, and H2-blocker QHS. I encouraged the patient once she has completed the evening meal to not snack or consume any other food products or caffeinated beverages for at least  minutes before retiring. Finally, I encouraged the patient to sleep about 30 degrees above horizontal, and this can be facilitated by using 2-3 pillows or a wedge foam product. If the patient is not demonstrably improved in 6-8 weeks, consultation with gastroenterology may be indicated to rule out intrinsic disease in the lower esophagus, stomach, or proximal duodenum.   Patient is encouraged to notify me for any chronic unexplained throat discomfort or trouble swallowing that stays longer than 2-3 weeks or worsens, and will obtain CT imaging as discussed.

## 2019-03-21 ENCOUNTER — OFFICE VISIT (OUTPATIENT)
Dept: INTERNAL MEDICINE | Facility: CLINIC | Age: 59
End: 2019-03-21
Payer: COMMERCIAL

## 2019-03-21 VITALS
HEIGHT: 64 IN | DIASTOLIC BLOOD PRESSURE: 93 MMHG | HEART RATE: 85 BPM | SYSTOLIC BLOOD PRESSURE: 149 MMHG | TEMPERATURE: 98 F | RESPIRATION RATE: 16 BRPM | BODY MASS INDEX: 36.7 KG/M2 | WEIGHT: 214.94 LBS

## 2019-03-21 DIAGNOSIS — F33.9 EPISODE OF RECURRENT MAJOR DEPRESSIVE DISORDER, UNSPECIFIED DEPRESSION EPISODE SEVERITY: Primary | ICD-10-CM

## 2019-03-21 DIAGNOSIS — M65.311 TRIGGER FINGER OF RIGHT THUMB: ICD-10-CM

## 2019-03-21 DIAGNOSIS — M79.644 PAIN OF RIGHT THUMB: ICD-10-CM

## 2019-03-21 PROCEDURE — 99999 PR PBB SHADOW E&M-EST. PATIENT-LVL III: CPT | Mod: PBBFAC,,, | Performed by: INTERNAL MEDICINE

## 2019-03-21 PROCEDURE — 99214 OFFICE O/P EST MOD 30 MIN: CPT | Mod: S$GLB,,, | Performed by: INTERNAL MEDICINE

## 2019-03-21 PROCEDURE — 99214 PR OFFICE/OUTPT VISIT, EST, LEVL IV, 30-39 MIN: ICD-10-PCS | Mod: S$GLB,,, | Performed by: INTERNAL MEDICINE

## 2019-03-21 PROCEDURE — 99999 PR PBB SHADOW E&M-EST. PATIENT-LVL III: ICD-10-PCS | Mod: PBBFAC,,, | Performed by: INTERNAL MEDICINE

## 2019-03-21 RX ORDER — ESCITALOPRAM OXALATE 10 MG/1
10 TABLET ORAL DAILY
Qty: 30 TABLET | Refills: 11 | Status: SHIPPED | OUTPATIENT
Start: 2019-03-21 | End: 2019-04-17 | Stop reason: SINTOL

## 2019-03-21 NOTE — PROGRESS NOTES
"Subjective:       Patient ID: Carmen Hernandez is a 59 y.o. female.    Chief Complaint: Nausea and Depression    HPI    She presents for evaluation of depression. She reports fatigue, weakness, problems focusing. She takes xanax as needed. She also reports decreased appetite and nausea. She denies suicidal thoughts. She also has anxiety. She thinks the symptoms worsened over the past 1-2 weeks.   She also notes that she has IBS. The depression is worsening her IBS.   She falls asleep at 1-3 am and wakes up around 1-3 pm. This started about one year ago. She also naps during the day.   She also reports trigger finger of right thumb. She has seen ortho and completed OT with improvement. It recently started to be painful after she did laundry.     Review of Systems   Constitutional: Positive for unexpected weight change. Negative for fever.   Respiratory: Negative for shortness of breath.    Cardiovascular: Negative for chest pain.   Gastrointestinal: Positive for abdominal pain and nausea. Negative for vomiting.   Genitourinary: Negative for difficulty urinating.   Neurological: Negative for syncope.   Psychiatric/Behavioral: Positive for dysphoric mood and sleep disturbance. Negative for self-injury and suicidal ideas. The patient is nervous/anxious.        Objective:        Vitals:    03/21/19 1033   BP: (!) 149/93   Pulse: 85   Resp: 16   Temp: 98.2 °F (36.8 °C)   TempSrc: Oral   Weight: 97.5 kg (214 lb 15.2 oz)   Height: 5' 4" (1.626 m)       Body mass index is 36.9 kg/m².    Physical Exam   Constitutional: She is oriented to person, place, and time. She appears well-developed and well-nourished. No distress.   HENT:   Head: Normocephalic and atraumatic.   Nose: Nose normal.   Eyes: Conjunctivae and EOM are normal. Right eye exhibits no discharge. Left eye exhibits no discharge.   Neck: Normal range of motion. Neck supple.   Cardiovascular: Normal rate, regular rhythm, normal heart sounds and intact distal " pulses.   Pulmonary/Chest: Effort normal and breath sounds normal.   Abdominal: Soft. Bowel sounds are normal.   Musculoskeletal: Normal range of motion. She exhibits no edema.        Right hand: She exhibits tenderness (right thumb).   Neurological: She is alert and oriented to person, place, and time.   Skin: Skin is warm and dry. She is not diaphoretic. No erythema.   Psychiatric: She has a normal mood and affect. Her behavior is normal. Thought content normal. She expresses no homicidal and no suicidal ideation. She expresses no suicidal plans and no homicidal plans.       Assessment:     1. Episode of recurrent major depressive disorder, unspecified depression episode severity    2. Pain of right thumb    3. Trigger finger of right thumb           Plan:         1. Episode of recurrent major depressive disorder, unspecified depression episode severity  - escitalopram oxalate (LEXAPRO) 10 MG tablet; Take 1 tablet (10 mg total) by mouth once daily.  Dispense: 30 tablet; Refill: 11    2. Pain of right thumb  - Ambulatory Referral to Orthopedics    3. Trigger finger of right thumb  - Ambulatory Referral to Orthopedics           Patient note was created using MModal Dictation.  Any errors in syntax or even information may not have been identified and edited on initial review prior to signing this note.

## 2019-03-22 DIAGNOSIS — M65.311 TRIGGER FINGER OF RIGHT THUMB: Primary | ICD-10-CM

## 2019-03-27 ENCOUNTER — TELEPHONE (OUTPATIENT)
Dept: INTERNAL MEDICINE | Facility: CLINIC | Age: 59
End: 2019-03-27

## 2019-03-27 NOTE — TELEPHONE ENCOUNTER
Patient wanted to know if she can take a half of pill or change medication because she stated that she is very drowsy?

## 2019-03-27 NOTE — TELEPHONE ENCOUNTER
----- Message from Mya Bermeo sent at 3/27/2019 10:38 AM CDT -----  Contact: Pt self Mobile/Home [phone]    Patient is calling in regards to her medication for escitalopram oxalate (LEXAPRO) 10 MG tablet that she said is making her extremely drowsy. She would like to know if she could take a half of pill or if she can have her medication changed to something else? Patient would like a call back please.

## 2019-03-28 RX ORDER — LISINOPRIL 40 MG/1
TABLET ORAL
Qty: 90 TABLET | Refills: 0 | Status: SHIPPED | OUTPATIENT
Start: 2019-03-28 | End: 2019-05-10 | Stop reason: ALTCHOICE

## 2019-04-01 RX ORDER — METFORMIN HYDROCHLORIDE 500 MG/1
TABLET ORAL
Qty: 360 TABLET | Refills: 0 | Status: SHIPPED | OUTPATIENT
Start: 2019-04-01 | End: 2019-06-29 | Stop reason: SDUPTHER

## 2019-04-03 ENCOUNTER — TELEPHONE (OUTPATIENT)
Dept: INTERNAL MEDICINE | Facility: CLINIC | Age: 59
End: 2019-04-03

## 2019-04-03 NOTE — TELEPHONE ENCOUNTER
----- Message from Jaci Ankur sent at 4/3/2019  1:06 PM CDT -----  Contact: Self call  [phone]  Call her in ref to one of  Lexapro side affects. She want to discuss some muscle twitching in left arm between shoulder and elbow off and on x 2-3 weeks. Should she be concerned?

## 2019-04-03 NOTE — TELEPHONE ENCOUNTER
See message from 3/27/19, as handled by Dr. Mccabe.    Pt believes that she is experiencing side effects from the Lexapro.    Please review and advise.    Thanks.

## 2019-04-03 NOTE — TELEPHONE ENCOUNTER
If this started after starting the lexapro it could be a side effect.  Advise stopping for now and give us an update in a week

## 2019-04-11 ENCOUNTER — TELEPHONE (OUTPATIENT)
Dept: INTERNAL MEDICINE | Facility: CLINIC | Age: 59
End: 2019-04-11

## 2019-04-11 NOTE — TELEPHONE ENCOUNTER
----- Message from Judy Rogers sent at 4/11/2019 10:21 AM CDT -----  Contact: pt [phone]   Pt called to give update on muscle twitching in arm, pt states it has improved.

## 2019-04-15 RX ORDER — ALPRAZOLAM 0.5 MG/1
TABLET ORAL
Qty: 100 TABLET | Refills: 0 | Status: SHIPPED | OUTPATIENT
Start: 2019-04-15 | End: 2019-05-10 | Stop reason: SDUPTHER

## 2019-04-16 ENCOUNTER — TELEPHONE (OUTPATIENT)
Dept: INTERNAL MEDICINE | Facility: CLINIC | Age: 59
End: 2019-04-16

## 2019-04-16 NOTE — TELEPHONE ENCOUNTER
----- Message from Linda Dewitt sent at 4/16/2019 10:49 AM CDT -----  Contact: Patient [phone]  Pt states that she is calling to speak with someone in the office. She states that she would like another prescription instead of the Lexipro. Please advise.        Thanks

## 2019-04-17 ENCOUNTER — TELEPHONE (OUTPATIENT)
Dept: ORTHOPEDICS | Facility: CLINIC | Age: 59
End: 2019-04-17

## 2019-04-17 RX ORDER — BUPROPION HYDROCHLORIDE 150 MG/1
150 TABLET ORAL DAILY
Qty: 30 TABLET | Refills: 3 | Status: SHIPPED | OUTPATIENT
Start: 2019-04-17 | End: 2019-10-03

## 2019-04-17 NOTE — TELEPHONE ENCOUNTER
Will try new med--wellbutrin--sent to pharm.  Give us an update in 1 month or before if any issues.

## 2019-04-17 NOTE — TELEPHONE ENCOUNTER
Appt rescheduled to 4/29/19    ----- Message from Gonzalo Mcgee sent at 4/17/2019  2:35 PM CDT -----  Contact: Patient    Type:  Patient Returning Call    Who Left Message for Patient: Staff  Does the patient know what this is regarding?: appointment  Best Call Back Number:    Additional Information:  Please try again

## 2019-04-29 ENCOUNTER — HOSPITAL ENCOUNTER (OUTPATIENT)
Dept: RADIOLOGY | Facility: HOSPITAL | Age: 59
Discharge: HOME | End: 2019-04-29
Attending: ORTHOPAEDIC SURGERY
Payer: COMMERCIAL

## 2019-04-29 ENCOUNTER — OFFICE VISIT (OUTPATIENT)
Dept: ORTHOPEDICS | Facility: CLINIC | Age: 59
End: 2019-04-29
Payer: COMMERCIAL

## 2019-04-29 VITALS — BODY MASS INDEX: 35.34 KG/M2 | HEIGHT: 64 IN | WEIGHT: 207 LBS

## 2019-04-29 DIAGNOSIS — M65.311 TRIGGER FINGER OF RIGHT THUMB: ICD-10-CM

## 2019-04-29 DIAGNOSIS — M65.311 TRIGGER FINGER OF RIGHT THUMB: Primary | ICD-10-CM

## 2019-04-29 PROCEDURE — 20550 TENDON SHEATH: R THUMB MCP: ICD-10-PCS | Mod: F5,S$GLB,, | Performed by: ORTHOPAEDIC SURGERY

## 2019-04-29 PROCEDURE — 73130 X-RAY EXAM OF HAND: CPT | Mod: 26,RT,, | Performed by: RADIOLOGY

## 2019-04-29 PROCEDURE — 99999 PR PBB SHADOW E&M-EST. PATIENT-LVL II: ICD-10-PCS | Mod: PBBFAC,,, | Performed by: ORTHOPAEDIC SURGERY

## 2019-04-29 PROCEDURE — 73110 XR WRIST COMPLETE 3 VIEWS RIGHT: ICD-10-PCS | Mod: 26,RT,, | Performed by: RADIOLOGY

## 2019-04-29 PROCEDURE — 99999 PR PBB SHADOW E&M-EST. PATIENT-LVL II: CPT | Mod: PBBFAC,,, | Performed by: ORTHOPAEDIC SURGERY

## 2019-04-29 PROCEDURE — 20550 NJX 1 TENDON SHEATH/LIGAMENT: CPT | Mod: F5,S$GLB,, | Performed by: ORTHOPAEDIC SURGERY

## 2019-04-29 PROCEDURE — 99214 PR OFFICE/OUTPT VISIT, EST, LEVL IV, 30-39 MIN: ICD-10-PCS | Mod: 25,S$GLB,, | Performed by: ORTHOPAEDIC SURGERY

## 2019-04-29 PROCEDURE — 73130 XR HAND COMPLETE 3 VIEW RIGHT: ICD-10-PCS | Mod: 26,RT,, | Performed by: RADIOLOGY

## 2019-04-29 PROCEDURE — 73110 X-RAY EXAM OF WRIST: CPT | Mod: 26,RT,, | Performed by: RADIOLOGY

## 2019-04-29 PROCEDURE — 99214 OFFICE O/P EST MOD 30 MIN: CPT | Mod: 25,S$GLB,, | Performed by: ORTHOPAEDIC SURGERY

## 2019-04-29 PROCEDURE — 73130 X-RAY EXAM OF HAND: CPT | Mod: TC,PO,RT

## 2019-04-29 PROCEDURE — 73110 X-RAY EXAM OF WRIST: CPT | Mod: TC,PO,RT

## 2019-04-29 RX ORDER — TRIAMCINOLONE ACETONIDE 40 MG/ML
40 INJECTION, SUSPENSION INTRA-ARTICULAR; INTRAMUSCULAR
Status: DISCONTINUED | OUTPATIENT
Start: 2019-04-29 | End: 2019-04-29 | Stop reason: HOSPADM

## 2019-04-29 RX ADMIN — TRIAMCINOLONE ACETONIDE 40 MG: 40 INJECTION, SUSPENSION INTRA-ARTICULAR; INTRAMUSCULAR at 03:04

## 2019-04-29 NOTE — PROGRESS NOTES
A 59 years old, complaining of locking, catching, and triggering of her right   thumb, been present now for about five months' time, stabbing pain, up to 8/10   on the pain scale.    Exam today shows tenderness along flexor sheath.  She cannot reproduce her   triggering today.  Skin is intact.  Compartments are soft.    X-rays do show degenerative changes.    ASSESSMENT:  Right trigger thumb.    PLAN:  Kenalog injection to the flexor sheath of the right thumb.  We discussed   with her surgical release.  We will see her back as needed.      PBB/HN  dd: 04/29/2019 15:02:16 (CDT)  td: 04/30/2019 00:20:35 (CDT)  Doc ID   #[id number]  Job ID #[id number]    CC:     Further History  Aching pain  Worse with activity  Relieved with rest  No other associated symptoms  No other radiation    Further Exam  Alert and oriented  Pleasant  Contralateral limb has appropriate range of motion for age and condition  Contralateral limb has appropriate strength for age and condition  Contralateral limb has appropriate stability  for age and condition  No adenopathy  Pulses are appropriate for current condition  Skin is intact        Chief Complaint    Chief Complaint   Patient presents with    trigger finger     right thumb       HPI  Carmen Hernandez is a 59 y.o.  female who presents with       Past Medical History  Past Medical History:   Diagnosis Date    Anxiety     Bilateral dry eyes     uses atears//    Cataract     Diabetes mellitus     LBSL 128 today---fluctuates    Diverticulosis     GERD (gastroesophageal reflux disease)     Hyperlipidemia     Hypertension     Irritable bowel syndrome     Nephrolithiasis     Severe obesity (BMI 35.0-39.9) with comorbidity        Past Surgical History  Past Surgical History:   Procedure Laterality Date    COLONOSCOPY  05/16/2013    PAM.   Diverticulosis in the sigmoid colon.  Tortuous colon.  Irritable bowel syndrome?.  Internal hemorrhoids.  Redundant colon. repeat in 5-6 years     COLONOSCOPY N/A 10/31/2018    [facility]    COLONOSCOPY N/A 5/16/2013    Performed by [facility]    COLONOSCOPY W/ POLYPECTOMY  5/13/2009  Rohan SPENCER.   One 4 mm polyp in the transverse colon.  HYPERPLASTIC POLYP.    Diverticulosis sigmoid colon.    Tortuous colon.   Granularity hepatic flexure.     CYSTOSCOPY W/ STONE MANIPULATION      ENDOMETRIAL BIOPSY  X2    ESOPHAGOGASTRODUODENOSCOPY (EGD) N/A 9/26/2017    Per[facility] by [facility]    HYSTERECTOMY      OOPHORECTOMY      UPPER GASTROINTESTINAL ENDOSCOPY  09/26/2017    Dr. Santos       Medications  Current Outpatient Medications   Medication Sig    alcohol swabs (BD ALCOHOL SWABS) PadM USE TO CHECK BLOOD GLUCOSE TWICE DAILY    ALPRAZolam (XANAX) 0.5 MG tablet TAKE 1 TABLET BY MOUTH FOUR TIMES DAILY AS NEEDED    buPROPion (WELLBUTRIN XL) 150 MG TB24 tablet Take 1 tablet (150 mg total) by mouth once daily.    lisinopril (PRINIVIL,ZESTRIL) 40 MG tablet TAKE 1 TABLET BY MOUTH EVERY DAY    metFORMIN (GLUCOPHAGE) 500 MG tablet TAKE 2 TABLETS BY MOUTH TWICE DAILY    omeprazole (PRILOSEC) 40 MG capsule Take 1 capsule (40 mg total) by mouth before breakfast. Take on empty stomach 30-60 minutes before meal    ONETOUCH DELICA LANCETS 33 gauge Misc USE TO TEST BLOOD GLUCOSE TWICE DAILY    ONETOUCH VERIO Strp USE TO TEST BLOOD SUGAR TWICE DAILY    ONETOUCH VERIO SYNC kit USE TO TEST BLOOD GLUOCSE TWICE DAILY    pravastatin (PRAVACHOL) 40 MG tablet TAKE 1 TABLET BY MOUTH EVERY DAY     No current facility-administered medications for this visit.        Allergies  Review of patient's allergies indicates:   Allergen Reactions    Demerol [meperidine] Hives    Iodinated contrast- oral and iv dye Hives    Bactrim [sulfamethoxazole-trimethoprim] Other (See Comments)     Other reaction(s): blotchy skin    Ciprofloxacin Other (See Comments)     Other  reaction(s): blotchy skin    Erythromycin Other (See Comments)     tachycardia       Family History  Family History   Problem Relation Age of Onset    Cataracts Mother     Heart disease Mother     Hypertension Mother     Diabetes Father     Hypertension Father     Diabetes Brother     Hypertension Brother     Macular degeneration Neg Hx     Retinal detachment Neg Hx     Strabismus Neg Hx     Stroke Neg Hx     Thyroid disease Neg Hx     Glaucoma Neg Hx     Cancer Neg Hx     Amblyopia Neg Hx     Blindness Neg Hx     Breast cancer Neg Hx     Colon cancer Neg Hx     Colon polyps Neg Hx     Crohn's disease Neg Hx     Ulcerative colitis Neg Hx     Stomach cancer Neg Hx     Esophageal cancer Neg Hx     Celiac disease Neg Hx        Social History  Social History     Socioeconomic History    Marital status:      Spouse name: Not on file    Number of children: Not on file    Years of education: Not on file    Highest education level: Not on file   Occupational History    Not on file   Social Needs    Financial resource strain: Not on file    Food insecurity:     Worry: Not on file     Inability: Not on file    Transportation needs:     Medical: Not on file     Non-medical: Not on file   Tobacco Use    Smoking status: Former Smoker     Last attempt to quit: 2013     Years since quittin.7    Smokeless tobacco: Never Used   Substance and Sexual Activity    Alcohol use: No    Drug use: No    Sexual activity: Not Currently     Birth control/protection: Post-menopausal   Lifestyle    Physical activity:     Days per week: Not on file     Minutes per session: Not on file    Stress: Not on file   Relationships    Social connections:     Talks on phone: Not on file     Gets together: Not on file     Attends Alevism service: Not on file     Active member of club or organization: Not on file     Attends meetings of clubs or organizations: Not on file     Relationship status: Not  on file   Other Topics Concern    Not on file   Social History Narrative    Not on file               Review of Systems     Constitutional: Negative    HENT: Negative  Eyes: Negative  Respiratory: Negative  Cardiovascular: Negative  Musculoskeletal: HPI  Skin: Negative  Neurological: Negative  Hematological: Negative  Endocrine: Negative                 Physical Exam    There were no vitals filed for this visit.  Body mass index is 35.53 kg/m².  Physical Examination:     General appearance -  well appearing, and in no distress  Mental status - awake  Neck - supple  Chest -  symmetric air entry  Heart - normal rate   Abdomen - soft      Assessment     1. Trigger finger of right thumb          Plan

## 2019-04-29 NOTE — PROCEDURES
Tendon Sheath: R thumb MCP  Date/Time: 4/29/2019 3:20 PM  Performed by: Fabien Valadez MD  Authorized by: Fabien Valadez MD     Location:  Thumb  Site:  R thumb MCP  Medications:  40 mg triamcinolone acetonide 40 mg/mL

## 2019-05-10 ENCOUNTER — OFFICE VISIT (OUTPATIENT)
Dept: INTERNAL MEDICINE | Facility: CLINIC | Age: 59
End: 2019-05-10
Payer: COMMERCIAL

## 2019-05-10 VITALS
RESPIRATION RATE: 19 BRPM | HEART RATE: 70 BPM | BODY MASS INDEX: 37.11 KG/M2 | TEMPERATURE: 97 F | OXYGEN SATURATION: 98 % | WEIGHT: 217.38 LBS | DIASTOLIC BLOOD PRESSURE: 73 MMHG | HEIGHT: 64 IN | SYSTOLIC BLOOD PRESSURE: 137 MMHG

## 2019-05-10 DIAGNOSIS — I15.2 HYPERTENSION ASSOCIATED WITH DIABETES: ICD-10-CM

## 2019-05-10 DIAGNOSIS — E11.69 DYSLIPIDEMIA ASSOCIATED WITH TYPE 2 DIABETES MELLITUS: ICD-10-CM

## 2019-05-10 DIAGNOSIS — R49.0 HOARSENESS: ICD-10-CM

## 2019-05-10 DIAGNOSIS — E11.9 DIABETES MELLITUS WITHOUT COMPLICATION: Primary | ICD-10-CM

## 2019-05-10 DIAGNOSIS — E78.5 DYSLIPIDEMIA ASSOCIATED WITH TYPE 2 DIABETES MELLITUS: ICD-10-CM

## 2019-05-10 DIAGNOSIS — E11.59 HYPERTENSION ASSOCIATED WITH DIABETES: ICD-10-CM

## 2019-05-10 PROCEDURE — 99999 PR PBB SHADOW E&M-EST. PATIENT-LVL III: CPT | Mod: PBBFAC,,, | Performed by: INTERNAL MEDICINE

## 2019-05-10 PROCEDURE — 99214 PR OFFICE/OUTPT VISIT, EST, LEVL IV, 30-39 MIN: ICD-10-PCS | Mod: S$GLB,,, | Performed by: INTERNAL MEDICINE

## 2019-05-10 PROCEDURE — 99999 PR PBB SHADOW E&M-EST. PATIENT-LVL III: ICD-10-PCS | Mod: PBBFAC,,, | Performed by: INTERNAL MEDICINE

## 2019-05-10 PROCEDURE — 99214 OFFICE O/P EST MOD 30 MIN: CPT | Mod: S$GLB,,, | Performed by: INTERNAL MEDICINE

## 2019-05-10 RX ORDER — ALPRAZOLAM 0.5 MG/1
TABLET ORAL
Qty: 100 TABLET | Refills: 0 | Status: SHIPPED | OUTPATIENT
Start: 2019-05-10 | End: 2019-06-05 | Stop reason: SDUPTHER

## 2019-05-10 RX ORDER — CANDESARTAN 8 MG/1
8 TABLET ORAL DAILY
Qty: 30 TABLET | Refills: 6 | Status: SHIPPED | OUTPATIENT
Start: 2019-05-10 | End: 2019-10-03

## 2019-05-13 NOTE — PROGRESS NOTES
History of present illness:  59-year-old lady with hypertension, diabetes mellitus, dyslipidemia following up on a couple of those issues.  Also couple of other concerns.  Recently started on Lexapro for anxiety depression but she did not tolerate that.  We had discussed per message in about restarting and different agent though she does not feel like she needs to do such at this time.  She reports persistent feeling of hoarseness few months.  Clearing of throat.  She is on an ACE-inhibitor-lisinopril.    Current medications:  All medications noted and reviewed in the electronic medical record medication list.    Review of systems:  Constitutional:  No fever no chills no generalized body aches.  Cardiovascular:  No chest pain no palpitations no syncope no claudication symptoms.  Respiratory:  No overt cough shortness of breath.  See HPI.  Neurologic:  No headaches or new focal neurological symptomatologies.  Is    Past medical history, past surgical history, family medical history social history is are all noted and reviewed in the electronic medical record history sections.    Physical examination:  General:  Alert pleasant appropriately groomed lady no acute distress.  Vital signs:  Vital signs all noted reviewed.  HEENT:  Normocephalic.  Neck supple no masses no thyromegaly mouth and pharynx appear normal.  Lungs:  Clear to auscultation.  Cardiovascular:  Normal heart sounds. No significant murmur and a click no rub no gallop. Carotids full bilaterally bruits.  Pedal pulses intact.  Diabetic foot exam:    Visual Inspection:  Normal -  Bilateral    Pedal Pulses:   Right: Present  Left: Present    Posterior tibialis:   Right:Present  Left: Present      Impression:  Diabetes mellitus due for update.  Hypertension reasonably controlled.  Dyslipidemia on statin therapy.  Hoarseness possibly related to ACE-inhibitor.    Plan:  Discontinue lisinopril.  Begin atacand 8 mg daily.  Update laboratory data to include a  complete metabolic profile, glycohemoglobin, lipid profile, TSH, urine for microalbumin/creatinine ratio.  Urinalysis.  Follow-up 6 weeks recheck blood pressure in follow-up.

## 2019-06-05 RX ORDER — ALPRAZOLAM 0.5 MG/1
TABLET ORAL
Qty: 100 TABLET | Refills: 0 | Status: SHIPPED | OUTPATIENT
Start: 2019-06-05 | End: 2019-06-30 | Stop reason: SDUPTHER

## 2019-06-12 ENCOUNTER — LAB VISIT (OUTPATIENT)
Dept: LAB | Facility: HOSPITAL | Age: 59
End: 2019-06-12
Attending: INTERNAL MEDICINE
Payer: COMMERCIAL

## 2019-06-12 DIAGNOSIS — E11.9 DIABETES MELLITUS WITHOUT COMPLICATION: ICD-10-CM

## 2019-06-12 DIAGNOSIS — E78.5 DYSLIPIDEMIA ASSOCIATED WITH TYPE 2 DIABETES MELLITUS: ICD-10-CM

## 2019-06-12 DIAGNOSIS — E11.59 HYPERTENSION ASSOCIATED WITH DIABETES: ICD-10-CM

## 2019-06-12 DIAGNOSIS — I15.2 HYPERTENSION ASSOCIATED WITH DIABETES: ICD-10-CM

## 2019-06-12 DIAGNOSIS — E11.69 DYSLIPIDEMIA ASSOCIATED WITH TYPE 2 DIABETES MELLITUS: ICD-10-CM

## 2019-06-12 LAB
ALBUMIN SERPL BCP-MCNC: 3.4 G/DL (ref 3.5–5.2)
ALP SERPL-CCNC: 80 U/L (ref 55–135)
ALT SERPL W/O P-5'-P-CCNC: 23 U/L (ref 10–44)
ANION GAP SERPL CALC-SCNC: 9 MMOL/L (ref 8–16)
AST SERPL-CCNC: 18 U/L (ref 10–40)
BASOPHILS # BLD AUTO: 0.05 K/UL (ref 0–0.2)
BASOPHILS NFR BLD: 0.6 % (ref 0–1.9)
BILIRUB SERPL-MCNC: 0.2 MG/DL (ref 0.1–1)
BUN SERPL-MCNC: 13 MG/DL (ref 8–23)
CALCIUM SERPL-MCNC: 9.8 MG/DL (ref 8.7–10.5)
CHLORIDE SERPL-SCNC: 103 MMOL/L (ref 95–110)
CHOLEST SERPL-MCNC: 214 MG/DL (ref 120–199)
CHOLEST/HDLC SERPL: 4.4 {RATIO} (ref 2–5)
CO2 SERPL-SCNC: 23 MMOL/L (ref 23–29)
CREAT SERPL-MCNC: 0.7 MG/DL (ref 0.5–1.4)
DIFFERENTIAL METHOD: ABNORMAL
EOSINOPHIL # BLD AUTO: 0.3 K/UL (ref 0–0.5)
EOSINOPHIL NFR BLD: 3.1 % (ref 0–8)
ERYTHROCYTE [DISTWIDTH] IN BLOOD BY AUTOMATED COUNT: 12.2 % (ref 11.5–14.5)
EST. GFR  (AFRICAN AMERICAN): >60 ML/MIN/1.73 M^2
EST. GFR  (NON AFRICAN AMERICAN): >60 ML/MIN/1.73 M^2
ESTIMATED AVG GLUCOSE: 186 MG/DL (ref 68–131)
GLUCOSE SERPL-MCNC: 219 MG/DL (ref 70–110)
HBA1C MFR BLD HPLC: 8.1 % (ref 4–5.6)
HCT VFR BLD AUTO: 42 % (ref 37–48.5)
HDLC SERPL-MCNC: 49 MG/DL (ref 40–75)
HDLC SERPL: 22.9 % (ref 20–50)
HGB BLD-MCNC: 13.7 G/DL (ref 12–16)
IMM GRANULOCYTES # BLD AUTO: 0.02 K/UL (ref 0–0.04)
IMM GRANULOCYTES NFR BLD AUTO: 0.2 % (ref 0–0.5)
LDLC SERPL CALC-MCNC: 106.8 MG/DL (ref 63–159)
LYMPHOCYTES # BLD AUTO: 2.7 K/UL (ref 1–4.8)
LYMPHOCYTES NFR BLD: 33.6 % (ref 18–48)
MCH RBC QN AUTO: 32.2 PG (ref 27–31)
MCHC RBC AUTO-ENTMCNC: 32.6 G/DL (ref 32–36)
MCV RBC AUTO: 99 FL (ref 82–98)
MONOCYTES # BLD AUTO: 0.6 K/UL (ref 0.3–1)
MONOCYTES NFR BLD: 7.2 % (ref 4–15)
NEUTROPHILS # BLD AUTO: 4.5 K/UL (ref 1.8–7.7)
NEUTROPHILS NFR BLD: 55.3 % (ref 38–73)
NONHDLC SERPL-MCNC: 165 MG/DL
NRBC BLD-RTO: 0 /100 WBC
PLATELET # BLD AUTO: 274 K/UL (ref 150–350)
PMV BLD AUTO: 11 FL (ref 9.2–12.9)
POTASSIUM SERPL-SCNC: 4.3 MMOL/L (ref 3.5–5.1)
PROT SERPL-MCNC: 6.5 G/DL (ref 6–8.4)
RBC # BLD AUTO: 4.25 M/UL (ref 4–5.4)
SODIUM SERPL-SCNC: 135 MMOL/L (ref 136–145)
TRIGL SERPL-MCNC: 291 MG/DL (ref 30–150)
WBC # BLD AUTO: 8.06 K/UL (ref 3.9–12.7)

## 2019-06-12 PROCEDURE — 80053 COMPREHEN METABOLIC PANEL: CPT

## 2019-06-12 PROCEDURE — 80061 LIPID PANEL: CPT

## 2019-06-12 PROCEDURE — 85025 COMPLETE CBC W/AUTO DIFF WBC: CPT

## 2019-06-12 PROCEDURE — 36415 COLL VENOUS BLD VENIPUNCTURE: CPT | Mod: PO

## 2019-06-12 PROCEDURE — 83036 HEMOGLOBIN GLYCOSYLATED A1C: CPT

## 2019-07-01 RX ORDER — LISINOPRIL 40 MG/1
TABLET ORAL
Qty: 90 TABLET | Refills: 0 | OUTPATIENT
Start: 2019-07-01

## 2019-07-01 RX ORDER — METFORMIN HYDROCHLORIDE 500 MG/1
TABLET ORAL
Qty: 360 TABLET | Refills: 0 | Status: SHIPPED | OUTPATIENT
Start: 2019-07-01 | End: 2019-09-26 | Stop reason: SDUPTHER

## 2019-07-01 RX ORDER — ALPRAZOLAM 0.5 MG/1
TABLET ORAL
Qty: 100 TABLET | Refills: 0 | Status: SHIPPED | OUTPATIENT
Start: 2019-07-01 | End: 2019-07-25 | Stop reason: SDUPTHER

## 2019-07-11 DIAGNOSIS — H10.829 ROSACEA BLEPHAROCONJUNCTIVITIS: ICD-10-CM

## 2019-07-11 RX ORDER — DOXYCYCLINE 50 MG/1
CAPSULE ORAL
Qty: 30 CAPSULE | Refills: 0 | Status: SHIPPED | OUTPATIENT
Start: 2019-07-11 | End: 2019-07-25 | Stop reason: SDUPTHER

## 2019-07-25 DIAGNOSIS — H10.829 ROSACEA BLEPHAROCONJUNCTIVITIS: ICD-10-CM

## 2019-07-26 RX ORDER — ALPRAZOLAM 0.5 MG/1
TABLET ORAL
Qty: 100 TABLET | Refills: 0 | Status: SHIPPED | OUTPATIENT
Start: 2019-07-26 | End: 2019-08-26 | Stop reason: SDUPTHER

## 2019-07-26 RX ORDER — DOXYCYCLINE 50 MG/1
CAPSULE ORAL
Qty: 30 CAPSULE | Refills: 3 | Status: SHIPPED | OUTPATIENT
Start: 2019-07-26 | End: 2020-08-25

## 2019-08-27 RX ORDER — ALPRAZOLAM 0.5 MG/1
TABLET ORAL
Qty: 100 TABLET | Refills: 0 | Status: SHIPPED | OUTPATIENT
Start: 2019-08-27 | End: 2019-09-24 | Stop reason: SDUPTHER

## 2019-09-24 NOTE — TELEPHONE ENCOUNTER
----- Message from Vazquez Coleman sent at 9/24/2019  4:29 PM CDT -----  Contact: self    Patient called in concerning medication candesartan (ATACAND) 8 MG tablet patient she does not like how it makes her fill and would like to discontinue taking this and go back to taking Lisinopril please advise

## 2019-09-25 RX ORDER — ALPRAZOLAM 0.5 MG/1
TABLET ORAL
Qty: 100 TABLET | Refills: 0 | Status: SHIPPED | OUTPATIENT
Start: 2019-09-25 | End: 2019-10-21 | Stop reason: SDUPTHER

## 2019-09-26 ENCOUNTER — TELEPHONE (OUTPATIENT)
Dept: INTERNAL MEDICINE | Facility: CLINIC | Age: 59
End: 2019-09-26

## 2019-09-26 DIAGNOSIS — E11.9 TYPE 2 DIABETES MELLITUS WITHOUT COMPLICATION: ICD-10-CM

## 2019-09-26 RX ORDER — METFORMIN HYDROCHLORIDE 500 MG/1
TABLET ORAL
Qty: 360 TABLET | Refills: 0 | Status: SHIPPED | OUTPATIENT
Start: 2019-09-26 | End: 2019-12-26

## 2019-09-26 NOTE — TELEPHONE ENCOUNTER
----- Message from Supriya Sebastian sent at 9/26/2019 12:19 PM CDT -----  Contact: Fabienne [phone]  Patient stated that she tried Rx candesartan (ATACAND) 8 MG tablet and wants to go back to lisinopril (PRINIVIL,ZESTRIL) 40 MG tablet.    [id number] [place] [address] [address]    Please call and advice.    Thank You

## 2019-09-26 NOTE — TELEPHONE ENCOUNTER
Left msg to call us back to advise what is problem with candesartan and be sure to bookr her follow up appt with dr davalos too.

## 2019-09-30 ENCOUNTER — TELEPHONE (OUTPATIENT)
Dept: INTERNAL MEDICINE | Facility: CLINIC | Age: 59
End: 2019-09-30

## 2019-09-30 NOTE — TELEPHONE ENCOUNTER
----- Message from Judy Rogers sent at 9/30/2019  1:52 PM CDT -----  Contact: pt [phone]  Pt called stating the following medication candesartan (ATACAND) 8 MG tablet, is causing dizziness and diarrhea. Pt would like go back to lisinopril. Please advise

## 2019-10-03 RX ORDER — LISINOPRIL 40 MG/1
40 TABLET ORAL DAILY
Qty: 90 TABLET | Refills: 0 | Status: SHIPPED | OUTPATIENT
Start: 2019-10-03 | End: 2019-12-30 | Stop reason: SDUPTHER

## 2019-10-03 NOTE — TELEPHONE ENCOUNTER
----- Message from Supriya Sebastian sent at 10/3/2019  1:50 PM CDT -----  Contact: Patient [phone]  Prescription Request:     Name of medication: lisinopril (PRINIVIL,ZESTRIL) 40 MG tablet     Reason for request: Refill    Pharmacy: [id number] [place] [address] [address] [facility] [address]    Please advise.    Thank You

## 2019-10-22 RX ORDER — ALPRAZOLAM 0.5 MG/1
TABLET ORAL
Qty: 100 TABLET | Refills: 0 | Status: SHIPPED | OUTPATIENT
Start: 2019-10-22 | End: 2019-11-20 | Stop reason: SDUPTHER

## 2019-10-25 ENCOUNTER — PATIENT OUTREACH (OUTPATIENT)
Dept: ADMINISTRATIVE | Facility: HOSPITAL | Age: 59
End: 2019-10-25

## 2019-11-08 ENCOUNTER — OFFICE VISIT (OUTPATIENT)
Dept: INTERNAL MEDICINE | Facility: CLINIC | Age: 59
End: 2019-11-08
Payer: COMMERCIAL

## 2019-11-08 ENCOUNTER — LAB VISIT (OUTPATIENT)
Dept: LAB | Facility: HOSPITAL | Age: 59
End: 2019-11-08
Attending: INTERNAL MEDICINE
Payer: COMMERCIAL

## 2019-11-08 VITALS
HEIGHT: 64 IN | HEART RATE: 72 BPM | DIASTOLIC BLOOD PRESSURE: 72 MMHG | BODY MASS INDEX: 36.05 KG/M2 | RESPIRATION RATE: 18 BRPM | SYSTOLIC BLOOD PRESSURE: 130 MMHG | TEMPERATURE: 99 F | WEIGHT: 211.19 LBS

## 2019-11-08 DIAGNOSIS — I15.2 HYPERTENSION ASSOCIATED WITH DIABETES: ICD-10-CM

## 2019-11-08 DIAGNOSIS — E11.59 HYPERTENSION ASSOCIATED WITH DIABETES: ICD-10-CM

## 2019-11-08 DIAGNOSIS — E78.5 DYSLIPIDEMIA ASSOCIATED WITH TYPE 2 DIABETES MELLITUS: ICD-10-CM

## 2019-11-08 DIAGNOSIS — E11.69 DYSLIPIDEMIA ASSOCIATED WITH TYPE 2 DIABETES MELLITUS: ICD-10-CM

## 2019-11-08 DIAGNOSIS — E11.9 DIABETES MELLITUS WITHOUT COMPLICATION: ICD-10-CM

## 2019-11-08 DIAGNOSIS — E11.9 DIABETES MELLITUS WITHOUT COMPLICATION: Primary | ICD-10-CM

## 2019-11-08 PROCEDURE — 99214 PR OFFICE/OUTPT VISIT, EST, LEVL IV, 30-39 MIN: ICD-10-PCS | Mod: S$GLB,,, | Performed by: INTERNAL MEDICINE

## 2019-11-08 PROCEDURE — 99214 OFFICE O/P EST MOD 30 MIN: CPT | Mod: S$GLB,,, | Performed by: INTERNAL MEDICINE

## 2019-11-08 PROCEDURE — 99999 PR PBB SHADOW E&M-EST. PATIENT-LVL IV: ICD-10-PCS | Mod: PBBFAC,,, | Performed by: INTERNAL MEDICINE

## 2019-11-08 PROCEDURE — 83036 HEMOGLOBIN GLYCOSYLATED A1C: CPT

## 2019-11-08 PROCEDURE — 36415 COLL VENOUS BLD VENIPUNCTURE: CPT | Mod: PO

## 2019-11-08 PROCEDURE — 99999 PR PBB SHADOW E&M-EST. PATIENT-LVL IV: CPT | Mod: PBBFAC,,, | Performed by: INTERNAL MEDICINE

## 2019-11-08 RX ORDER — FLUOCINONIDE 0.5 MG/G
CREAM TOPICAL
Refills: 1 | COMMUNITY
Start: 2019-08-06 | End: 2020-10-05 | Stop reason: SDUPTHER

## 2019-11-09 LAB
ESTIMATED AVG GLUCOSE: 206 MG/DL (ref 68–131)
HBA1C MFR BLD HPLC: 8.8 % (ref 4–5.6)

## 2019-11-09 NOTE — PROGRESS NOTES
History of present illness:  [age] lady with hypertension, diabetes mellitus, dyslipidemia following up.  Couple of months ago we had changed her ACE-inhibitor to candesartan.  She reports this caused side effects and she discontinued it and resumed ACE-inhibitor therapy.  Currently no new particular concerns problems or complaints.  She is taking her medications as directed.  Her most recent glycohemoglobin was not optimal.    Current medications:  All medications noted and reviewed in the electronic medical record medication list.    Review of systems:  Cardiovascular:  No chest pain palpitations syncope claudication or edema  Respiratory:  No cough shortness of breath.  Neurologic:  No new focal neurological symptomatologies.    Past medical history, past surgical history, family medical history social history is are all noted and reviewed in the electronic medical record history sections.    Physical examination:  General:  Alert female no acute distress appropriately groomed.  Vital signs:  Blood pressure taken manually 130/76.  HEENT:  Normocephalic.  Neck supple no masses no thyromegaly.  Lungs:  Clear to auscultation.  Cardiovascular:  Regular rate and rhythm. No murmur click seroma gallop carotids full bilaterally bruits no peripheral extremity edema no ischemic changes lower extremities.  Mental status:  Alert oriented affect mood all appropriate    Impression:  Hypertension reasonably controlled, patient not receptive to any additional medication changes.  Type 2 diabetes mellitus suspected inadequate control.  Dyslipidemia on statin therapy.    Plan:  Update glycohemoglobin.  She declines any vaccinations.  Return to clinic 3-4 months.

## 2019-11-14 NOTE — TELEPHONE ENCOUNTER
----- Message from Vazquez Coleman sent at 11/14/2019  2:32 PM CST -----  Contact: self   Patient called in concerning medication Januvia 25mg patient would like a refill but for only 25 mg if possible I did not see this medication on her chart please advise

## 2019-11-20 RX ORDER — ALPRAZOLAM 0.5 MG/1
TABLET ORAL
Qty: 100 TABLET | Refills: 0 | Status: SHIPPED | OUTPATIENT
Start: 2019-11-20 | End: 2019-12-19

## 2019-12-19 RX ORDER — ALPRAZOLAM 0.5 MG/1
TABLET ORAL
Qty: 100 TABLET | Refills: 0 | Status: SHIPPED | OUTPATIENT
Start: 2019-12-19 | End: 2020-01-14

## 2019-12-26 RX ORDER — METFORMIN HYDROCHLORIDE 500 MG/1
TABLET ORAL
Qty: 360 TABLET | Refills: 0 | Status: SHIPPED | OUTPATIENT
Start: 2019-12-26 | End: 2020-03-09

## 2019-12-30 RX ORDER — LISINOPRIL 40 MG/1
40 TABLET ORAL DAILY
Qty: 90 TABLET | Refills: 0 | Status: SHIPPED | OUTPATIENT
Start: 2019-12-30 | End: 2020-03-12

## 2019-12-30 NOTE — TELEPHONE ENCOUNTER
----- Message from Krys Zuniga sent at 12/30/2019  1:02 PM CST -----  Contact: self   Patient is calling for an RX refill or new RX.  Is this a refill or new RX:  refill  RX name and strength: lisinopril (PRINIVIL,ZESTRIL) 40 MG tablet  Directions (copy/paste from chart):  Take 1 tablet (40 mg total) by mouth once daily  Is this a 30 day or 90 day RX:  90  Local pharmacy or mail order pharmacy:  local  Pharmacy name and phone # (copy/paste from chart):   [id number] [place] [address] [address] [facility] [address] [phone] (Phone)  [phone] (Fax)  Comments:

## 2020-01-02 ENCOUNTER — TELEPHONE (OUTPATIENT)
Dept: INTERNAL MEDICINE | Facility: CLINIC | Age: 60
End: 2020-01-02

## 2020-01-02 RX ORDER — BLOOD-GLUCOSE METER
EACH MISCELLANEOUS
Qty: 200 STRIP | Refills: 6 | Status: SHIPPED | OUTPATIENT
Start: 2020-01-02 | End: 2020-01-02 | Stop reason: SDUPTHER

## 2020-01-02 NOTE — TELEPHONE ENCOUNTER
Inform the patient that her most recent glycohemoglobin recheck remains not optimal.  Clarify that she has been taking her Januvia regularly

## 2020-01-02 NOTE — TELEPHONE ENCOUNTER
Spoke with pt to advise of results and md recommendations.    Verbalized understanding.    1. States that she has only been taking the Januvia after seeing the most recent 8.8 A1C.    2. She needs a refill on her test strips.    Please advise.    Thanks.

## 2020-01-14 RX ORDER — ALPRAZOLAM 0.5 MG/1
TABLET ORAL
Qty: 100 TABLET | Refills: 0 | Status: SHIPPED | OUTPATIENT
Start: 2020-01-14 | End: 2020-02-11

## 2020-01-30 ENCOUNTER — TELEPHONE (OUTPATIENT)
Dept: OBSTETRICS AND GYNECOLOGY | Facility: CLINIC | Age: 60
End: 2020-01-30

## 2020-01-30 NOTE — TELEPHONE ENCOUNTER
----- Message from Kaley Georges sent at 1/30/2020  3:46 PM CST -----  Contact: self [phone]  Type:  Mammogram    Caller is requesting to schedule their annual mammogram appointment.  Order is not listed in EPIC.  Please enter order and contact patient to schedule.    Name of Caller:  ADIS ROMEO [[id number]]  Where would they like the mammogram performed?  [facility]  Best Call Back Number: [phone]

## 2020-02-05 ENCOUNTER — LAB VISIT (OUTPATIENT)
Dept: LAB | Facility: HOSPITAL | Age: 60
End: 2020-02-05
Attending: INTERNAL MEDICINE
Payer: COMMERCIAL

## 2020-02-05 DIAGNOSIS — E11.9 TYPE 2 DIABETES MELLITUS WITHOUT COMPLICATION: ICD-10-CM

## 2020-02-05 PROCEDURE — 36415 COLL VENOUS BLD VENIPUNCTURE: CPT | Mod: PO

## 2020-02-05 PROCEDURE — 83036 HEMOGLOBIN GLYCOSYLATED A1C: CPT

## 2020-02-06 LAB
ESTIMATED AVG GLUCOSE: 180 MG/DL (ref 68–131)
HBA1C MFR BLD HPLC: 7.9 % (ref 4–5.6)

## 2020-02-11 RX ORDER — ALPRAZOLAM 0.5 MG/1
TABLET ORAL
Qty: 100 TABLET | Refills: 0 | Status: SHIPPED | OUTPATIENT
Start: 2020-02-11 | End: 2020-03-05

## 2020-02-13 ENCOUNTER — PATIENT OUTREACH (OUTPATIENT)
Dept: ADMINISTRATIVE | Facility: HOSPITAL | Age: 60
End: 2020-02-13

## 2020-02-24 ENCOUNTER — CLINICAL SUPPORT (OUTPATIENT)
Dept: URGENT CARE | Facility: CLINIC | Age: 60
End: 2020-02-24
Payer: COMMERCIAL

## 2020-02-24 PROCEDURE — 90686 FLU VACCINE (QUAD) GREATER THAN OR EQUAL TO 3YO PRESERVATIVE FREE IM: ICD-10-PCS | Mod: S$GLB,,, | Performed by: FAMILY MEDICINE

## 2020-02-24 PROCEDURE — 90471 FLU VACCINE (QUAD) GREATER THAN OR EQUAL TO 3YO PRESERVATIVE FREE IM: ICD-10-PCS | Mod: S$GLB,,, | Performed by: FAMILY MEDICINE

## 2020-02-24 PROCEDURE — 90686 IIV4 VACC NO PRSV 0.5 ML IM: CPT | Mod: S$GLB,,, | Performed by: FAMILY MEDICINE

## 2020-02-24 PROCEDURE — 90471 IMMUNIZATION ADMIN: CPT | Mod: S$GLB,,, | Performed by: FAMILY MEDICINE

## 2020-03-02 ENCOUNTER — PATIENT OUTREACH (OUTPATIENT)
Dept: ADMINISTRATIVE | Facility: OTHER | Age: 60
End: 2020-03-02

## 2020-03-02 DIAGNOSIS — E11.9 TYPE 2 DIABETES MELLITUS WITHOUT COMPLICATION, UNSPECIFIED WHETHER LONG TERM INSULIN USE: Primary | ICD-10-CM

## 2020-03-02 NOTE — PROGRESS NOTES
Care Everywhere: n/a  Immunization: updated  Health Maintenance: updated  Media Review: reviewed for possible outside eye exam report  Legacy Review: n/a  Order placed: diabetic eye screening photo  Upcoming appts:n/a

## 2020-03-03 ENCOUNTER — OFFICE VISIT (OUTPATIENT)
Dept: GASTROENTEROLOGY | Facility: CLINIC | Age: 60
End: 2020-03-03
Payer: COMMERCIAL

## 2020-03-03 VITALS
WEIGHT: 221.56 LBS | BODY MASS INDEX: 38.03 KG/M2 | DIASTOLIC BLOOD PRESSURE: 80 MMHG | SYSTOLIC BLOOD PRESSURE: 144 MMHG

## 2020-03-03 DIAGNOSIS — K21.00 GASTROESOPHAGEAL REFLUX DISEASE WITH ESOPHAGITIS: Primary | ICD-10-CM

## 2020-03-03 DIAGNOSIS — K21.9 LARYNGOPHARYNGEAL REFLUX (LPR): ICD-10-CM

## 2020-03-03 PROBLEM — R19.7 DIARRHEA: Status: RESOLVED | Noted: 2018-10-31 | Resolved: 2020-03-03

## 2020-03-03 PROCEDURE — 99999 PR PBB SHADOW E&M-EST. PATIENT-LVL III: ICD-10-PCS | Mod: PBBFAC,,, | Performed by: INTERNAL MEDICINE

## 2020-03-03 PROCEDURE — 99213 PR OFFICE/OUTPT VISIT, EST, LEVL III, 20-29 MIN: ICD-10-PCS | Mod: S$GLB,,, | Performed by: INTERNAL MEDICINE

## 2020-03-03 PROCEDURE — 99213 OFFICE O/P EST LOW 20 MIN: CPT | Mod: S$GLB,,, | Performed by: INTERNAL MEDICINE

## 2020-03-03 PROCEDURE — 99999 PR PBB SHADOW E&M-EST. PATIENT-LVL III: CPT | Mod: PBBFAC,,, | Performed by: INTERNAL MEDICINE

## 2020-03-03 RX ORDER — OMEPRAZOLE 20 MG/1
20 CAPSULE, DELAYED RELEASE ORAL DAILY
Qty: 90 CAPSULE | Refills: 3 | Status: SHIPPED | OUTPATIENT
Start: 2020-03-03 | End: 2020-12-28

## 2020-03-03 NOTE — PROGRESS NOTES
[facility] Established Patient Visit    Reason for Visit:    Chief Complaint   Patient presents with    Follow-up    Other     post-nasal drip    Hoarse       PCP: LUKAS Bentley    HPI:  Carmen Hernandez is a 60 y.o. female here for follow-up of GERD.  Previously seen on the [place] by Dr. Santos and his group, last seen in September 2018 for similar symptoms.  She has also been seen by ENT on the [place], Ms. Toussaint, last seen a year ago.  She had diagnosed LPR due to ongoing cough and findings during laryngoscopy.  She recently got a new dog and has noticed increased postnasal drip.  The symptoms are only worse when she is at home.  She denies the current cough.  She is on Prilosec.  She reports never having had classic heartburn symptoms, but she did improve with PPI use.  EGD 09/26/2017 revealed grade a esophagitis.              PMHX:  has a past medical history of Anxiety, Bilateral dry eyes, Cataract, Diabetes mellitus, Diverticulosis, GERD (gastroesophageal reflux disease), Hyperlipidemia, Hypertension, Irritable bowel syndrome, Nephrolithiasis, and Severe obesity (BMI 35.0-39.9) with comorbidity.    PSHX:  has a past surgical history that includes Endometrial biopsy (X2); Cystoscopy w/ stone manipulation; Oophorectomy; Hysterectomy; Colonoscopy w/ polypectomy (5/13/2009  [place]); Colonoscopy (05/16/2013); Upper gastrointestinal endoscopy (09/26/2017); and Colonoscopy (N/A, 10/31/2018).    The patient's social and family histories were reviewed by me and updated in the appropriate section of the electronic medical record.    Review of patient's allergies indicates:   Allergen Reactions    Demerol [meperidine] Hives    Iodinated contrast media Hives    Bactrim [sulfamethoxazole-trimethoprim] Other (See Comments)     Other reaction(s): blotchy skin    Ciprofloxacin Other (See Comments)     Other reaction(s): blotchy skin    Erythromycin Other (See Comments)      tachycardia       Prior to Admission medications    Medication Sig Start Date End Date Taking? Authorizing Provider   alcohol swabs (BD ALCOHOL SWABS) PadM USE TO CHECK BLOOD GLUCOSE TWICE DAILY 9/2/16  Yes SHAGUFTA Bentley MD   ALPRAZolam (XANAX) 0.5 MG tablet TAKE 1 TABLET BY MOUTH FOUR TIMES DAILY AS NEEDED 2/11/20  Yes SHAGUFTA Bentley MD   blood sugar diagnostic (ONETOUCH VERIO) Strp USE TO TEST BLOOD SUGAR TWICE DAILY  Patient taking differently: USE TO TEST BLOOD SUGAR 3-4 times DAILY 1/2/20  Yes SHAGUFTA Bentley MD   lisinopril (PRINIVIL,ZESTRIL) 40 MG tablet Take 1 tablet (40 mg total) by mouth once daily. 12/30/19  Yes SHAGUFTA Bentley MD   metFORMIN (GLUCOPHAGE) 500 MG tablet TAKE 2 TABLETS BY MOUTH TWICE DAILY 12/26/19  Yes SHAGUFTA Bentley MD   omeprazole (PRILOSEC) 40 MG capsule Take 1 capsule (40 mg total) by mouth before breakfast. Take on empty stomach 30-60 minutes before meal 3/19/19 3/18/20 Yes Isadora Toussaint, NP   ONETOUCH DELICA LANCETS 33 gauge Misc USE TO TEST BLOOD GLUCOSE TWICE DAILY 9/2/16  Yes SHAGUFTA Bentley MD   ONETOUCH VERIO SYNC kit USE TO TEST BLOOD GLUOCSE TWICE DAILY 9/2/16  Yes SHAGUFTA Bentley MD   pravastatin (PRAVACHOL) 40 MG tablet TAKE 1 TABLET BY MOUTH EVERY DAY 6/22/18  Yes SHAGUFTA Bentley MD   SITagliptin (JANUVIA) 50 MG Tab Take 1 tablet (50 mg total) by mouth once daily. 11/14/19 3/3/20 Yes SHAGUFTA Bentley MD   doxycycline (MONODOX) 50 MG Cap TAKE 1 CAPSULE BY MOUTH EVERY DAY  Patient not taking: Reported on 11/8/2019 7/26/19   DESIREE Jackson, ALLEN   fluocinonide 0.05% (LIDEX) 0.05 % cream PASCALE TOPICALLY TO LEGS BID 8/6/19   Historical Provider, MD         Objective Findings:  Vital Signs:  BP (!) 144/80   Wt 100.5 kg (221 lb 9 oz)   BMI 38.03 kg/m²  Body mass index is 38.03 kg/m².    Physical Exam:  General Appearance:  Well appearing in no acute distress, appears stated age        Labs:  Lab Results   Component Value Date    WBC 8.06 06/12/2019    HGB 13.7  06/12/2019    HCT 42.0 06/12/2019    MCV 99 (H) 06/12/2019    RDW 12.2 06/12/2019     06/12/2019    GRAN 4.5 06/12/2019    GRAN 55.3 06/12/2019    LYMPH 2.7 06/12/2019    LYMPH 33.6 06/12/2019    MONO 0.6 06/12/2019    MONO 7.2 06/12/2019    EOS 0.3 06/12/2019    BASO 0.05 06/12/2019     Lab Results   Component Value Date     (L) 06/12/2019    K 4.3 06/12/2019     06/12/2019    CO2 23 06/12/2019     (H) 06/12/2019    BUN 13 06/12/2019    CREATININE 0.7 06/12/2019    CALCIUM 9.8 06/12/2019    PROT 6.5 06/12/2019    ALBUMIN 3.4 (L) 06/12/2019    BILITOT 0.2 06/12/2019    ALKPHOS 80 06/12/2019    AST 18 06/12/2019    ALT 23 06/12/2019                   Assessment:  Carmen Hernandez is a 60 y.o. female here with:  1. Gastroesophageal reflux disease with esophagitis    2. Laryngopharyngeal reflux (LPR)      History suspected GERD with atypical symptoms.  Symptoms resolved with PPI.  Grade a esophagitis noted during endoscopy.  No evidence of [place]'s.  LPR diagnosed by laryngoscopy.  She also has symptoms consistent with allergies.  She is doing well on her current dose of Prilosec, but this as at 40 mg.        Recommendations:  Reduce Prilosec to 20 mg p.o. daily.  If symptoms recur, she can go back to 40 mg.    Return to clinic in 3 months.        Medical decision making:  Visit time 15 min with more than 50% of time spent counseling on treatment of reflux.          Jean Girard MD

## 2020-03-05 RX ORDER — ALPRAZOLAM 0.5 MG/1
TABLET ORAL
Qty: 100 TABLET | Refills: 0 | Status: SHIPPED | OUTPATIENT
Start: 2020-03-05 | End: 2020-03-25

## 2020-03-09 RX ORDER — METFORMIN HYDROCHLORIDE 500 MG/1
TABLET ORAL
Qty: 360 TABLET | Refills: 0 | Status: SHIPPED | OUTPATIENT
Start: 2020-03-09 | End: 2020-06-08

## 2020-03-12 RX ORDER — LISINOPRIL 40 MG/1
TABLET ORAL
Qty: 90 TABLET | Refills: 0 | Status: SHIPPED | OUTPATIENT
Start: 2020-03-12 | End: 2020-06-08

## 2020-03-25 RX ORDER — ALPRAZOLAM 0.5 MG/1
TABLET ORAL
Qty: 100 TABLET | Refills: 0 | Status: SHIPPED | OUTPATIENT
Start: 2020-03-25 | End: 2020-04-20

## 2020-04-20 RX ORDER — ALPRAZOLAM 0.5 MG/1
TABLET ORAL
Qty: 100 TABLET | Refills: 0 | Status: SHIPPED | OUTPATIENT
Start: 2020-04-20 | End: 2020-05-14

## 2020-05-14 RX ORDER — ALPRAZOLAM 0.5 MG/1
0.5 TABLET ORAL 4 TIMES DAILY PRN
Qty: 100 TABLET | Refills: 0 | OUTPATIENT
Start: 2020-05-14

## 2020-05-14 RX ORDER — ALPRAZOLAM 0.5 MG/1
TABLET ORAL
Qty: 100 TABLET | Refills: 0 | Status: SHIPPED | OUTPATIENT
Start: 2020-05-14 | End: 2020-06-10

## 2020-05-14 NOTE — TELEPHONE ENCOUNTER
----- Message from Akanksha Hill sent at 5/14/2020  1:14 PM CDT -----  Contact: self/[phone]  Requesting an RX refill or new RX.  Is this a refill or new RX:  Refill 1  RX name and strength: SITagliptin (JANUVIA) 50 MG Tab  Directions (copy/paste from chart):    Is this a 30 day or 90 day RX:  90 days  Local pharmacy or mail order pharmacy: local pharmacy   Pharmacy name and phone # (copy/paste from chart): [facility] #[id number] [place] [email or web address] [phone] (Phone) [phone] (Fax)    Comments:        Requesting an RX refill or new RX.  Is this a refill or new RX:  Refill 1  RX name and strength: ALPRAZolam (XANAX) 0.5 MG tablet  Directions (copy/paste from chart):    Is this a 30 day or 90 day RX:  90 days  Local pharmacy or mail order pharmacy: local pharmacy   Pharmacy name and phone # (copy/paste from chart): [facility] #[id number] - [place], [place] - [facility] AT [address] [phone] (Phone) [phone] (Fax)    Comments:

## 2020-05-15 DIAGNOSIS — E11.9 TYPE 2 DIABETES MELLITUS WITHOUT COMPLICATION: ICD-10-CM

## 2020-06-10 RX ORDER — ALPRAZOLAM 0.5 MG/1
TABLET ORAL
Qty: 100 TABLET | Refills: 0 | Status: SHIPPED | OUTPATIENT
Start: 2020-06-10 | End: 2020-07-08

## 2020-06-26 DIAGNOSIS — E11.9 TYPE 2 DIABETES MELLITUS WITHOUT COMPLICATION: ICD-10-CM

## 2020-07-08 RX ORDER — ALPRAZOLAM 0.5 MG/1
TABLET ORAL
Qty: 100 TABLET | Refills: 0 | Status: SHIPPED | OUTPATIENT
Start: 2020-07-08 | End: 2020-08-04

## 2020-08-04 RX ORDER — ALPRAZOLAM 0.5 MG/1
TABLET ORAL
Qty: 100 TABLET | Refills: 0 | Status: SHIPPED | OUTPATIENT
Start: 2020-08-04 | End: 2020-08-25

## 2020-08-25 RX ORDER — ALPRAZOLAM 0.5 MG/1
TABLET ORAL
Qty: 100 TABLET | Refills: 0 | Status: SHIPPED | OUTPATIENT
Start: 2020-08-25 | End: 2020-09-22

## 2020-09-17 ENCOUNTER — OFFICE VISIT (OUTPATIENT)
Dept: DERMATOLOGY | Facility: CLINIC | Age: 60
End: 2020-09-17
Payer: COMMERCIAL

## 2020-09-17 ENCOUNTER — PATIENT OUTREACH (OUTPATIENT)
Dept: ADMINISTRATIVE | Facility: OTHER | Age: 60
End: 2020-09-17

## 2020-09-17 VITALS — BODY MASS INDEX: 38.12 KG/M2 | HEIGHT: 64 IN | WEIGHT: 223.31 LBS

## 2020-09-17 DIAGNOSIS — L82.1 SEBORRHEIC KERATOSES: Primary | ICD-10-CM

## 2020-09-17 DIAGNOSIS — D48.5 NEOPLASM OF UNCERTAIN BEHAVIOR OF SKIN: ICD-10-CM

## 2020-09-17 PROCEDURE — 88312 PR  SPECIAL STAINS,GROUP I: ICD-10-PCS | Mod: 26,,, | Performed by: PATHOLOGY

## 2020-09-17 PROCEDURE — 88305 TISSUE EXAM BY PATHOLOGIST: CPT | Performed by: PATHOLOGY

## 2020-09-17 PROCEDURE — 11102 PR TANGENTIAL BIOPSY, SKIN, SINGLE LESION: ICD-10-PCS | Mod: S$GLB,,, | Performed by: DERMATOLOGY

## 2020-09-17 PROCEDURE — 99213 OFFICE O/P EST LOW 20 MIN: CPT | Mod: 25,S$GLB,, | Performed by: DERMATOLOGY

## 2020-09-17 PROCEDURE — 99999 PR PBB SHADOW E&M-EST. PATIENT-LVL III: ICD-10-PCS | Mod: PBBFAC,,, | Performed by: DERMATOLOGY

## 2020-09-17 PROCEDURE — 99999 PR PBB SHADOW E&M-EST. PATIENT-LVL III: CPT | Mod: PBBFAC,,, | Performed by: DERMATOLOGY

## 2020-09-17 PROCEDURE — 88305 TISSUE EXAM BY PATHOLOGIST: CPT | Mod: 26,,, | Performed by: PATHOLOGY

## 2020-09-17 PROCEDURE — 99213 PR OFFICE/OUTPT VISIT, EST, LEVL III, 20-29 MIN: ICD-10-PCS | Mod: 25,S$GLB,, | Performed by: DERMATOLOGY

## 2020-09-17 PROCEDURE — 88312 SPECIAL STAINS GROUP 1: CPT | Mod: 26,,, | Performed by: PATHOLOGY

## 2020-09-17 PROCEDURE — 88312 SPECIAL STAINS GROUP 1: CPT | Performed by: PATHOLOGY

## 2020-09-17 PROCEDURE — 11102 TANGNTL BX SKIN SINGLE LES: CPT | Mod: S$GLB,,, | Performed by: DERMATOLOGY

## 2020-09-17 PROCEDURE — 88305 TISSUE EXAM BY PATHOLOGIST: ICD-10-PCS | Mod: 26,,, | Performed by: PATHOLOGY

## 2020-09-17 NOTE — PROGRESS NOTES
Subjective:       Patient ID:  Carmen Hernandez is a 60 y.o. female who presents for   Chief Complaint   Patient presents with    Lesion     61 y/o F presents for follow up office visit. Last OV 11/2018  There is a lesion on L forearm x 1 month. She used lidex, polysporin--> no improvement.    There is a lesion on R inner thigh x 1 month.  She used lidex with mild improvement-->not completely healed.    Derm Hx: h/o psoriasis on both ears, elbows treated with Lidex cream 0.05 %     Denies history of NMSC  Denies family history of melanoma      Past Medical History:   Diagnosis Date    Anxiety     Bilateral dry eyes     uses atears//    Cataract     Diabetes mellitus     LBSL 128 today---fluctuates    Diverticulosis     GERD (gastroesophageal reflux disease)     Hyperlipidemia     Hypertension     Irritable bowel syndrome     Nephrolithiasis     Severe obesity (BMI 35.0-39.9) with comorbidity        Review of Systems   Musculoskeletal: Negative for joint swelling and arthralgias.   Skin: Positive for itching, rash and dry skin. Negative for abscesses.   Hematologic/Lymphatic: Does not bruise/bleed easily.        Objective:    Physical Exam   Constitutional: She appears well-developed and well-nourished.   Neurological: She is alert and oriented to person, place, and time.   Psychiatric: She has a normal mood and affect.   Skin:   Areas Examined (abnormalities noted in diagram):   Head / Face Inspection Performed  Neck Inspection Performed  LUE Inspection Performed  RLE Inspected  Nails and Digits Inspection Performed                  Diagram Legend     Erythematous scaling macule/papule c/w actinic keratosis       Vascular papule c/w angioma      Pigmented verrucoid papule/plaque c/w seborrheic keratosis      Yellow umbilicated papule c/w sebaceous hyperplasia      Irregularly shaped tan macule c/w lentigo     1-2 mm smooth white papules consistent with Milia      Movable subcutaneous cyst with  punctum c/w epidermal inclusion cyst      Subcutaneous movable cyst c/w pilar cyst      Firm pink to brown papule c/w dermatofibroma      Pedunculated fleshy papule(s) c/w skin tag(s)      Evenly pigmented macule c/w junctional nevus     Mildly variegated pigmented, slightly irregular-bordered macule c/w mildly atypical nevus      Flesh colored to evenly pigmented papule c/w intradermal nevus       Pink pearly papule/plaque c/w basal cell carcinoma      Erythematous hyperkeratotic cursted plaque c/w SCC      Surgical scar with no sign of skin cancer recurrence      Open and closed comedones      Inflammatory papules and pustules      Verrucoid papule consistent consistent with wart     Erythematous eczematous patches and plaques     Dystrophic onycholytic nail with subungual debris c/w onychomycosis     Umbilicated papule    Erythematous-base heme-crusted tan verrucoid plaque consistent with inflamed seborrheic keratosis     Erythematous Silvery Scaling Plaque c/w Psoriasis     See annotation      Assessment / Plan:      Pathology Orders:     Normal Orders This Visit    Specimen to Pathology, Dermatology     Comments:    Number of Specimens:->1  ------------------------->-------------------------  Spec 1 Procedure:->Biopsy  Spec 1 Clinical Impression:->ISK vs SCC vs other  Spec 1 Source:->L dorsal forearm    Questions:    Procedure Type: Dermatology and skin neoplasms    Number of Specimens: 1    ------------------------: -------------------------    Spec 1 Procedure: Biopsy    Spec 1 Clinical Impression: ISK vs SCC vs other    Spec 1 Source: L dorsal forearm        Neoplasm of uncertain behavior of skin  -     Specimen to Pathology, Dermatology  Shave biopsy procedure note:    Shave biopsy performed after verbal consent including risk of infection, scar, recurrence, need for additional treatment of site. Area prepped with alcohol, anesthetized with approximately 1.0cc of 1% lidocaine with epinephrine. Lesional  tissue shaved with razor blade. Hemostasis achieved with application of aluminum chloride followed by hyfrecation. No complications. Dressing applied. Wound care explained.    Seborrheic keratoses  These are benign inherited growths without a malignant potential. Reassurance given to patient. No treatment is necessary.              Follow up for pending Pathology.

## 2020-09-17 NOTE — PROGRESS NOTES
Health Maintenance Due   Topic Date Due    HIV Screening  02/23/1973    TETANUS VACCINE  02/23/1976    Pneumococcal Vaccine (Medium Risk) (1 of 1 - PPSV23) 02/23/1977    Shingles Vaccine (1 of 2) 02/23/2008    Eye Exam  09/15/2018    Hemoglobin A1c  05/05/2020    Influenza Vaccine (1) 08/01/2020     Updates were requested from care everywhere.  Chart was reviewed for overdue Proactive [facility] Encounters (TIN[facility]) topics (CRS, Breast Cancer Screening, Eye exam)  Health Maintenance has been updated.  LINKS immunization registry triggered.  Immunizations were reconciled.

## 2020-09-22 LAB
FINAL PATHOLOGIC DIAGNOSIS: NORMAL
GROSS: NORMAL
MICROSCOPIC EXAM: NORMAL

## 2020-09-22 RX ORDER — ALPRAZOLAM 0.5 MG/1
TABLET ORAL
Qty: 100 TABLET | Refills: 0 | Status: SHIPPED | OUTPATIENT
Start: 2020-09-22 | End: 2020-10-21 | Stop reason: SDUPTHER

## 2020-09-24 ENCOUNTER — PATIENT MESSAGE (OUTPATIENT)
Dept: DERMATOLOGY | Facility: CLINIC | Age: 60
End: 2020-09-24

## 2020-10-05 ENCOUNTER — PATIENT MESSAGE (OUTPATIENT)
Dept: ADMINISTRATIVE | Facility: HOSPITAL | Age: 60
End: 2020-10-05

## 2020-10-06 RX ORDER — FLUOCINONIDE 0.5 MG/G
CREAM TOPICAL 2 TIMES DAILY
Qty: 30 G | Refills: 1 | Status: SHIPPED | OUTPATIENT
Start: 2020-10-06 | End: 2020-11-02 | Stop reason: SDUPTHER

## 2020-10-13 RX ORDER — PRAVASTATIN SODIUM 40 MG/1
40 TABLET ORAL DAILY
Qty: 90 TABLET | Refills: 3 | Status: SHIPPED | OUTPATIENT
Start: 2020-10-13 | End: 2021-08-17

## 2020-10-13 NOTE — TELEPHONE ENCOUNTER
----- Message from Vazquez Coleman sent at 10/13/2020  1:16 PM CDT -----  Regarding: self [phone]  Requesting an RX refill or new RX.  Is this a refill or new RX:  refill   RX name and strength: pravastatin (PRAVACHOL) 40 MG tablet  Is this a 30 day or 90 day RX:  90  Pharmacy name and phone # (copy/paste from chart):   [facility] #[id number] [place] [address] [address] [facility] [address] [phone] (Phone)  [phone] (Fax)

## 2020-10-14 ENCOUNTER — LAB VISIT (OUTPATIENT)
Dept: LAB | Facility: HOSPITAL | Age: 60
End: 2020-10-14
Attending: INTERNAL MEDICINE
Payer: COMMERCIAL

## 2020-10-14 DIAGNOSIS — E11.9 TYPE 2 DIABETES MELLITUS WITHOUT COMPLICATION: ICD-10-CM

## 2020-10-14 LAB
CHOLEST SERPL-MCNC: 184 MG/DL (ref 120–199)
CHOLEST/HDLC SERPL: 3.6 {RATIO} (ref 2–5)
ESTIMATED AVG GLUCOSE: 169 MG/DL (ref 68–131)
HBA1C MFR BLD HPLC: 7.5 % (ref 4–5.6)
HDLC SERPL-MCNC: 51 MG/DL (ref 40–75)
HDLC SERPL: 27.7 % (ref 20–50)
LDLC SERPL CALC-MCNC: 102.4 MG/DL (ref 63–159)
NONHDLC SERPL-MCNC: 133 MG/DL
TRIGL SERPL-MCNC: 153 MG/DL (ref 30–150)

## 2020-10-14 PROCEDURE — 80061 LIPID PANEL: CPT

## 2020-10-14 PROCEDURE — 83036 HEMOGLOBIN GLYCOSYLATED A1C: CPT

## 2020-10-14 PROCEDURE — 36415 COLL VENOUS BLD VENIPUNCTURE: CPT | Mod: PO

## 2020-10-21 RX ORDER — ALPRAZOLAM 0.5 MG/1
0.5 TABLET ORAL 4 TIMES DAILY PRN
Qty: 100 TABLET | Refills: 0 | Status: SHIPPED | OUTPATIENT
Start: 2020-10-21 | End: 2020-11-18 | Stop reason: SDUPTHER

## 2020-10-21 NOTE — TELEPHONE ENCOUNTER
----- Message from Cassandra Lacey sent at 10/21/2020  1:36 PM CDT -----  Regarding: refill request  Contact: patient  [phone]  Requesting an RX refill or new RX.  Is this a refill or new RX: refill  RX name and strength:ALPRAZolam (XANAX) 0.5 MG tablet  Is this a 30 day or 90 day RX: 30  Pharmacy name and phone # [facility] #[id number] [place] [address] [address] AT [facility] [address] [phone] (Phone)  [phone] (Fax)      Comments: Patient would like to know if she can get a 90 refill

## 2020-11-04 RX ORDER — FLUOCINONIDE 0.5 MG/G
CREAM TOPICAL 2 TIMES DAILY
Qty: 30 G | Refills: 1 | Status: SHIPPED | OUTPATIENT
Start: 2020-11-04 | End: 2023-12-28

## 2020-11-18 RX ORDER — ALPRAZOLAM 0.5 MG/1
0.5 TABLET ORAL 4 TIMES DAILY PRN
Qty: 100 TABLET | Refills: 0 | Status: SHIPPED | OUTPATIENT
Start: 2020-11-18 | End: 2020-12-10 | Stop reason: SDUPTHER

## 2020-12-08 RX ORDER — METFORMIN HYDROCHLORIDE 500 MG/1
1000 TABLET ORAL 2 TIMES DAILY
Qty: 360 TABLET | Refills: 0 | Status: SHIPPED | OUTPATIENT
Start: 2020-12-08 | End: 2021-03-04

## 2020-12-08 RX ORDER — LISINOPRIL 40 MG/1
40 TABLET ORAL DAILY
Qty: 90 TABLET | Refills: 0 | Status: SHIPPED | OUTPATIENT
Start: 2020-12-08 | End: 2021-03-04

## 2020-12-08 NOTE — TELEPHONE ENCOUNTER
Patient requesting a refill of her med . Her last office vis was 11/8/2019. I called patient and left a message she Due for a fu appointment

## 2020-12-11 RX ORDER — ALPRAZOLAM 0.5 MG/1
0.5 TABLET ORAL 4 TIMES DAILY PRN
Qty: 100 TABLET | Refills: 0 | Status: SHIPPED | OUTPATIENT
Start: 2020-12-11 | End: 2021-01-08 | Stop reason: SDUPTHER

## 2020-12-17 ENCOUNTER — PATIENT OUTREACH (OUTPATIENT)
Dept: ADMINISTRATIVE | Facility: HOSPITAL | Age: 60
End: 2020-12-17

## 2021-01-04 ENCOUNTER — PATIENT MESSAGE (OUTPATIENT)
Dept: ADMINISTRATIVE | Facility: HOSPITAL | Age: 61
End: 2021-01-04

## 2021-01-05 ENCOUNTER — PATIENT MESSAGE (OUTPATIENT)
Dept: INTERNAL MEDICINE | Facility: CLINIC | Age: 61
End: 2021-01-05

## 2021-01-06 ENCOUNTER — TELEPHONE (OUTPATIENT)
Dept: OBSTETRICS AND GYNECOLOGY | Facility: CLINIC | Age: 61
End: 2021-01-06

## 2021-01-06 DIAGNOSIS — Z12.31 VISIT FOR SCREENING MAMMOGRAM: Primary | ICD-10-CM

## 2021-01-06 RX ORDER — BLOOD-GLUCOSE METER
EACH MISCELLANEOUS
Qty: 200 STRIP | Refills: 6 | Status: SHIPPED | OUTPATIENT
Start: 2021-01-06 | End: 2022-02-15

## 2021-01-08 RX ORDER — ALPRAZOLAM 0.5 MG/1
0.5 TABLET ORAL 4 TIMES DAILY PRN
Qty: 100 TABLET | Refills: 0 | Status: SHIPPED | OUTPATIENT
Start: 2021-01-08 | End: 2021-02-02

## 2021-01-21 ENCOUNTER — TELEPHONE (OUTPATIENT)
Dept: INTERNAL MEDICINE | Facility: CLINIC | Age: 61
End: 2021-01-21

## 2021-01-26 ENCOUNTER — HOSPITAL ENCOUNTER (OUTPATIENT)
Dept: RADIOLOGY | Facility: HOSPITAL | Age: 61
Discharge: HOME | End: 2021-01-26
Attending: OBSTETRICS & GYNECOLOGY
Payer: COMMERCIAL

## 2021-01-26 DIAGNOSIS — Z12.31 VISIT FOR SCREENING MAMMOGRAM: ICD-10-CM

## 2021-01-26 PROCEDURE — 77063 BREAST TOMOSYNTHESIS BI: CPT | Mod: 26,,, | Performed by: RADIOLOGY

## 2021-01-26 PROCEDURE — 77067 SCR MAMMO BI INCL CAD: CPT | Mod: 26,,, | Performed by: RADIOLOGY

## 2021-01-26 PROCEDURE — 77063 MAMMO DIGITAL SCREENING BILAT WITH TOMO: ICD-10-PCS | Mod: 26,,, | Performed by: RADIOLOGY

## 2021-01-26 PROCEDURE — 77067 SCR MAMMO BI INCL CAD: CPT | Mod: TC,PO

## 2021-01-26 PROCEDURE — 77067 MAMMO DIGITAL SCREENING BILAT WITH TOMO: ICD-10-PCS | Mod: 26,,, | Performed by: RADIOLOGY

## 2021-02-01 ENCOUNTER — CLINICAL SUPPORT (OUTPATIENT)
Dept: URGENT CARE | Facility: CLINIC | Age: 61
End: 2021-02-01
Payer: COMMERCIAL

## 2021-02-01 DIAGNOSIS — Z91.89 AT INCREASED RISK OF EXPOSURE TO COVID-19 VIRUS: Primary | ICD-10-CM

## 2021-02-01 LAB
CTP QC/QA: YES
SARS-COV-2 RDRP RESP QL NAA+PROBE: NEGATIVE

## 2021-02-01 PROCEDURE — U0002: ICD-10-PCS | Mod: QW,S$GLB,, | Performed by: FAMILY MEDICINE

## 2021-02-01 PROCEDURE — 99211 OFF/OP EST MAY X REQ PHY/QHP: CPT | Mod: S$GLB,,, | Performed by: FAMILY MEDICINE

## 2021-02-01 PROCEDURE — U0002 COVID-19 LAB TEST NON-CDC: HCPCS | Mod: QW,S$GLB,, | Performed by: FAMILY MEDICINE

## 2021-02-01 PROCEDURE — 99211 PR OFFICE/OUTPT VISIT, EST, LEVL I: ICD-10-PCS | Mod: S$GLB,,, | Performed by: FAMILY MEDICINE

## 2021-02-02 RX ORDER — ALPRAZOLAM 0.5 MG/1
TABLET ORAL
Qty: 100 TABLET | Refills: 0 | Status: SHIPPED | OUTPATIENT
Start: 2021-02-02 | End: 2021-03-01

## 2021-03-01 RX ORDER — ALPRAZOLAM 0.5 MG/1
TABLET ORAL
Qty: 100 TABLET | Refills: 0 | Status: SHIPPED | OUTPATIENT
Start: 2021-03-01 | End: 2021-04-01

## 2021-04-01 RX ORDER — ALPRAZOLAM 0.5 MG/1
TABLET ORAL
Qty: 100 TABLET | Refills: 0 | Status: SHIPPED | OUTPATIENT
Start: 2021-04-01 | End: 2021-04-26

## 2021-04-05 ENCOUNTER — PATIENT MESSAGE (OUTPATIENT)
Dept: ADMINISTRATIVE | Facility: HOSPITAL | Age: 61
End: 2021-04-05

## 2021-04-23 ENCOUNTER — PATIENT OUTREACH (OUTPATIENT)
Dept: ADMINISTRATIVE | Facility: HOSPITAL | Age: 61
End: 2021-04-23

## 2021-04-23 ENCOUNTER — PATIENT MESSAGE (OUTPATIENT)
Dept: ADMINISTRATIVE | Facility: HOSPITAL | Age: 61
End: 2021-04-23

## 2021-04-23 ENCOUNTER — TELEPHONE (OUTPATIENT)
Dept: INTERNAL MEDICINE | Facility: CLINIC | Age: 61
End: 2021-04-23

## 2021-04-23 DIAGNOSIS — E11.9 DIABETES MELLITUS WITHOUT COMPLICATION: ICD-10-CM

## 2021-04-23 DIAGNOSIS — Z00.00 ROUTINE GENERAL MEDICAL EXAMINATION AT A HEALTH CARE FACILITY: Primary | ICD-10-CM

## 2021-04-26 RX ORDER — ALPRAZOLAM 0.5 MG/1
TABLET ORAL
Qty: 100 TABLET | Refills: 1 | Status: SHIPPED | OUTPATIENT
Start: 2021-04-26 | End: 2021-06-23

## 2021-05-26 ENCOUNTER — OFFICE VISIT (OUTPATIENT)
Dept: OPTOMETRY | Facility: CLINIC | Age: 61
End: 2021-05-26
Payer: COMMERCIAL

## 2021-05-26 DIAGNOSIS — H52.203 MYOPIA WITH ASTIGMATISM AND PRESBYOPIA, BILATERAL: ICD-10-CM

## 2021-05-26 DIAGNOSIS — E11.36 CATARACT ASSOCIATED WITH TYPE 2 DIABETES MELLITUS: ICD-10-CM

## 2021-05-26 DIAGNOSIS — H01.00A BLEPHARITIS OF UPPER AND LOWER EYELIDS OF BOTH EYES, UNSPECIFIED TYPE: ICD-10-CM

## 2021-05-26 DIAGNOSIS — E11.9 DIABETES MELLITUS TYPE 2 WITHOUT RETINOPATHY: Primary | ICD-10-CM

## 2021-05-26 DIAGNOSIS — H01.00B BLEPHARITIS OF UPPER AND LOWER EYELIDS OF BOTH EYES, UNSPECIFIED TYPE: ICD-10-CM

## 2021-05-26 DIAGNOSIS — H52.4 MYOPIA WITH ASTIGMATISM AND PRESBYOPIA, BILATERAL: ICD-10-CM

## 2021-05-26 DIAGNOSIS — H52.13 MYOPIA WITH ASTIGMATISM AND PRESBYOPIA, BILATERAL: ICD-10-CM

## 2021-05-26 DIAGNOSIS — H43.813 POSTERIOR VITREOUS DETACHMENT, BILATERAL: ICD-10-CM

## 2021-05-26 DIAGNOSIS — H04.123 DRY EYES, BILATERAL: ICD-10-CM

## 2021-05-26 DIAGNOSIS — Z13.5 GLAUCOMA SCREENING: ICD-10-CM

## 2021-05-26 PROCEDURE — 92015 DETERMINE REFRACTIVE STATE: CPT | Mod: S$GLB,,, | Performed by: OPTOMETRIST

## 2021-05-26 PROCEDURE — 92004 COMPRE OPH EXAM NEW PT 1/>: CPT | Mod: S$GLB,,, | Performed by: OPTOMETRIST

## 2021-05-26 PROCEDURE — 99999 PR PBB SHADOW E&M-EST. PATIENT-LVL III: CPT | Mod: PBBFAC,,, | Performed by: OPTOMETRIST

## 2021-05-26 PROCEDURE — 99999 PR PBB SHADOW E&M-EST. PATIENT-LVL III: ICD-10-PCS | Mod: PBBFAC,,, | Performed by: OPTOMETRIST

## 2021-05-26 PROCEDURE — 92015 PR REFRACTION: ICD-10-PCS | Mod: S$GLB,,, | Performed by: OPTOMETRIST

## 2021-05-26 PROCEDURE — 92004 PR EYE EXAM, NEW PATIENT,COMPREHESV: ICD-10-PCS | Mod: S$GLB,,, | Performed by: OPTOMETRIST

## 2021-06-23 RX ORDER — METFORMIN HYDROCHLORIDE 500 MG/1
TABLET ORAL
Qty: 360 TABLET | Refills: 0 | Status: SHIPPED | OUTPATIENT
Start: 2021-06-23 | End: 2021-12-08

## 2021-06-23 RX ORDER — LISINOPRIL 40 MG/1
TABLET ORAL
Qty: 90 TABLET | Refills: 0 | Status: SHIPPED | OUTPATIENT
Start: 2021-06-23 | End: 2022-05-27 | Stop reason: SDUPTHER

## 2021-06-23 RX ORDER — LISINOPRIL 40 MG/1
TABLET ORAL
Qty: 90 TABLET | Refills: 0 | Status: SHIPPED | OUTPATIENT
Start: 2021-06-23 | End: 2021-07-05 | Stop reason: SDUPTHER

## 2021-06-23 RX ORDER — ALPRAZOLAM 0.5 MG/1
TABLET ORAL
Qty: 100 TABLET | Refills: 0 | Status: SHIPPED | OUTPATIENT
Start: 2021-06-23 | End: 2021-07-26

## 2021-06-25 ENCOUNTER — LAB VISIT (OUTPATIENT)
Dept: LAB | Facility: HOSPITAL | Age: 61
End: 2021-06-25
Attending: INTERNAL MEDICINE
Payer: COMMERCIAL

## 2021-06-25 DIAGNOSIS — E11.9 DIABETES MELLITUS WITHOUT COMPLICATION: ICD-10-CM

## 2021-06-25 DIAGNOSIS — Z00.00 ROUTINE GENERAL MEDICAL EXAMINATION AT A HEALTH CARE FACILITY: ICD-10-CM

## 2021-06-25 LAB
ALBUMIN SERPL BCP-MCNC: 3.7 G/DL (ref 3.5–5.2)
ALP SERPL-CCNC: 61 U/L (ref 55–135)
ALT SERPL W/O P-5'-P-CCNC: 27 U/L (ref 10–44)
ANION GAP SERPL CALC-SCNC: 8 MMOL/L (ref 8–16)
AST SERPL-CCNC: 20 U/L (ref 10–40)
BASOPHILS # BLD AUTO: 0.05 K/UL (ref 0–0.2)
BASOPHILS NFR BLD: 0.6 % (ref 0–1.9)
BILIRUB SERPL-MCNC: 0.4 MG/DL (ref 0.1–1)
BUN SERPL-MCNC: 14 MG/DL (ref 8–23)
CALCIUM SERPL-MCNC: 10.1 MG/DL (ref 8.7–10.5)
CHLORIDE SERPL-SCNC: 103 MMOL/L (ref 95–110)
CHOLEST SERPL-MCNC: 173 MG/DL (ref 120–199)
CHOLEST/HDLC SERPL: 3.7 {RATIO} (ref 2–5)
CO2 SERPL-SCNC: 25 MMOL/L (ref 23–29)
CREAT SERPL-MCNC: 0.7 MG/DL (ref 0.5–1.4)
DIFFERENTIAL METHOD: ABNORMAL
EOSINOPHIL # BLD AUTO: 0.2 K/UL (ref 0–0.5)
EOSINOPHIL NFR BLD: 2.7 % (ref 0–8)
ERYTHROCYTE [DISTWIDTH] IN BLOOD BY AUTOMATED COUNT: 12.6 % (ref 11.5–14.5)
EST. GFR  (AFRICAN AMERICAN): >60 ML/MIN/1.73 M^2
EST. GFR  (NON AFRICAN AMERICAN): >60 ML/MIN/1.73 M^2
ESTIMATED AVG GLUCOSE: 171 MG/DL (ref 68–131)
GLUCOSE SERPL-MCNC: 183 MG/DL (ref 70–110)
HBA1C MFR BLD: 7.6 % (ref 4–5.6)
HCT VFR BLD AUTO: 40.6 % (ref 37–48.5)
HDLC SERPL-MCNC: 47 MG/DL (ref 40–75)
HDLC SERPL: 27.2 % (ref 20–50)
HGB BLD-MCNC: 13.2 G/DL (ref 12–16)
IMM GRANULOCYTES # BLD AUTO: 0.03 K/UL (ref 0–0.04)
IMM GRANULOCYTES NFR BLD AUTO: 0.3 % (ref 0–0.5)
LDLC SERPL CALC-MCNC: 95.8 MG/DL (ref 63–159)
LYMPHOCYTES # BLD AUTO: 3 K/UL (ref 1–4.8)
LYMPHOCYTES NFR BLD: 33.8 % (ref 18–48)
MCH RBC QN AUTO: 31.5 PG (ref 27–31)
MCHC RBC AUTO-ENTMCNC: 32.5 G/DL (ref 32–36)
MCV RBC AUTO: 97 FL (ref 82–98)
MONOCYTES # BLD AUTO: 0.6 K/UL (ref 0.3–1)
MONOCYTES NFR BLD: 6.4 % (ref 4–15)
NEUTROPHILS # BLD AUTO: 4.9 K/UL (ref 1.8–7.7)
NEUTROPHILS NFR BLD: 56.2 % (ref 38–73)
NONHDLC SERPL-MCNC: 126 MG/DL
NRBC BLD-RTO: 0 /100 WBC
PLATELET # BLD AUTO: 307 K/UL (ref 150–450)
PMV BLD AUTO: 11.2 FL (ref 9.2–12.9)
POTASSIUM SERPL-SCNC: 4.3 MMOL/L (ref 3.5–5.1)
PROT SERPL-MCNC: 6.8 G/DL (ref 6–8.4)
RBC # BLD AUTO: 4.19 M/UL (ref 4–5.4)
SODIUM SERPL-SCNC: 136 MMOL/L (ref 136–145)
T4 FREE SERPL-MCNC: 1.07 NG/DL (ref 0.71–1.51)
TRIGL SERPL-MCNC: 151 MG/DL (ref 30–150)
TSH SERPL DL<=0.005 MIU/L-ACNC: 4.17 UIU/ML (ref 0.4–4)
WBC # BLD AUTO: 8.75 K/UL (ref 3.9–12.7)

## 2021-06-25 PROCEDURE — 83036 HEMOGLOBIN GLYCOSYLATED A1C: CPT | Performed by: INTERNAL MEDICINE

## 2021-06-25 PROCEDURE — 84443 ASSAY THYROID STIM HORMONE: CPT | Performed by: INTERNAL MEDICINE

## 2021-06-25 PROCEDURE — 80061 LIPID PANEL: CPT | Performed by: INTERNAL MEDICINE

## 2021-06-25 PROCEDURE — 85025 COMPLETE CBC W/AUTO DIFF WBC: CPT | Performed by: INTERNAL MEDICINE

## 2021-06-25 PROCEDURE — 36415 COLL VENOUS BLD VENIPUNCTURE: CPT | Mod: PO | Performed by: INTERNAL MEDICINE

## 2021-06-25 PROCEDURE — 84439 ASSAY OF FREE THYROXINE: CPT | Performed by: INTERNAL MEDICINE

## 2021-06-25 PROCEDURE — 80053 COMPREHEN METABOLIC PANEL: CPT | Performed by: INTERNAL MEDICINE

## 2021-07-02 ENCOUNTER — OFFICE VISIT (OUTPATIENT)
Dept: INTERNAL MEDICINE | Facility: CLINIC | Age: 61
End: 2021-07-02
Payer: COMMERCIAL

## 2021-07-02 VITALS
RESPIRATION RATE: 18 BRPM | TEMPERATURE: 98 F | SYSTOLIC BLOOD PRESSURE: 130 MMHG | WEIGHT: 217.81 LBS | HEART RATE: 93 BPM | OXYGEN SATURATION: 97 % | BODY MASS INDEX: 37.19 KG/M2 | DIASTOLIC BLOOD PRESSURE: 84 MMHG | HEIGHT: 64 IN

## 2021-07-02 DIAGNOSIS — Z00.00 ENCOUNTER FOR PREVENTIVE HEALTH EXAMINATION: Primary | ICD-10-CM

## 2021-07-02 DIAGNOSIS — E11.59 HYPERTENSION ASSOCIATED WITH DIABETES: ICD-10-CM

## 2021-07-02 DIAGNOSIS — K21.9 GASTROESOPHAGEAL REFLUX DISEASE, UNSPECIFIED WHETHER ESOPHAGITIS PRESENT: ICD-10-CM

## 2021-07-02 DIAGNOSIS — E11.9 TYPE 2 DIABETES MELLITUS WITHOUT COMPLICATION, WITHOUT LONG-TERM CURRENT USE OF INSULIN: ICD-10-CM

## 2021-07-02 DIAGNOSIS — E66.01 SEVERE OBESITY (BMI 35.0-39.9) WITH COMORBIDITY: ICD-10-CM

## 2021-07-02 DIAGNOSIS — E78.5 DYSLIPIDEMIA ASSOCIATED WITH TYPE 2 DIABETES MELLITUS: ICD-10-CM

## 2021-07-02 DIAGNOSIS — I15.2 HYPERTENSION ASSOCIATED WITH DIABETES: ICD-10-CM

## 2021-07-02 DIAGNOSIS — E11.69 DYSLIPIDEMIA ASSOCIATED WITH TYPE 2 DIABETES MELLITUS: ICD-10-CM

## 2021-07-02 PROCEDURE — 99396 PREV VISIT EST AGE 40-64: CPT | Mod: S$GLB,,, | Performed by: INTERNAL MEDICINE

## 2021-07-02 PROCEDURE — 99999 PR PBB SHADOW E&M-EST. PATIENT-LVL IV: CPT | Mod: PBBFAC,,, | Performed by: INTERNAL MEDICINE

## 2021-07-02 PROCEDURE — 99396 PR PREVENTIVE VISIT,EST,40-64: ICD-10-PCS | Mod: S$GLB,,, | Performed by: INTERNAL MEDICINE

## 2021-07-02 PROCEDURE — 99999 PR PBB SHADOW E&M-EST. PATIENT-LVL IV: ICD-10-PCS | Mod: PBBFAC,,, | Performed by: INTERNAL MEDICINE

## 2021-07-02 RX ORDER — EMPAGLIFLOZIN 10 MG/1
10 TABLET, FILM COATED ORAL DAILY
Qty: 30 TABLET | Refills: 6 | Status: SHIPPED | OUTPATIENT
Start: 2021-07-02 | End: 2022-05-27

## 2021-07-26 RX ORDER — ALPRAZOLAM 0.5 MG/1
TABLET ORAL
Qty: 100 TABLET | Refills: 0 | Status: SHIPPED | OUTPATIENT
Start: 2021-07-26 | End: 2021-08-20

## 2021-08-17 ENCOUNTER — TELEPHONE (OUTPATIENT)
Dept: ENDOSCOPY | Facility: HOSPITAL | Age: 61
End: 2021-08-17

## 2021-08-18 ENCOUNTER — TELEPHONE (OUTPATIENT)
Dept: OTOLARYNGOLOGY | Facility: CLINIC | Age: 61
End: 2021-08-18

## 2021-08-20 ENCOUNTER — OFFICE VISIT (OUTPATIENT)
Dept: GASTROENTEROLOGY | Facility: CLINIC | Age: 61
End: 2021-08-20
Payer: COMMERCIAL

## 2021-08-20 ENCOUNTER — TELEPHONE (OUTPATIENT)
Dept: ENDOSCOPY | Facility: HOSPITAL | Age: 61
End: 2021-08-20

## 2021-08-20 DIAGNOSIS — K21.9 LARYNGOPHARYNGEAL REFLUX (LPR): ICD-10-CM

## 2021-08-20 DIAGNOSIS — K21.00 GASTROESOPHAGEAL REFLUX DISEASE WITH ESOPHAGITIS WITHOUT HEMORRHAGE: Primary | ICD-10-CM

## 2021-08-20 PROCEDURE — 99212 PR OFFICE/OUTPT VISIT, EST, LEVL II, 10-19 MIN: ICD-10-PCS | Mod: 95,,, | Performed by: INTERNAL MEDICINE

## 2021-08-20 PROCEDURE — 99212 OFFICE O/P EST SF 10 MIN: CPT | Mod: 95,,, | Performed by: INTERNAL MEDICINE

## 2021-08-20 RX ORDER — OMEPRAZOLE 40 MG/1
40 CAPSULE, DELAYED RELEASE ORAL DAILY
Qty: 90 CAPSULE | Refills: 3 | Status: SHIPPED | OUTPATIENT
Start: 2021-08-20 | End: 2022-08-13

## 2021-08-20 RX ORDER — ALPRAZOLAM 0.5 MG/1
0.5 TABLET ORAL 4 TIMES DAILY PRN
Qty: 100 TABLET | Refills: 0 | OUTPATIENT
Start: 2021-08-20

## 2021-08-20 RX ORDER — ALPRAZOLAM 0.5 MG/1
TABLET ORAL
Qty: 100 TABLET | Refills: 0 | Status: SHIPPED | OUTPATIENT
Start: 2021-08-20 | End: 2021-09-13

## 2021-09-13 RX ORDER — ALPRAZOLAM 0.5 MG/1
TABLET ORAL
Qty: 100 TABLET | Refills: 1 | Status: SHIPPED | OUTPATIENT
Start: 2021-09-13 | End: 2021-11-04 | Stop reason: SDUPTHER

## 2021-09-27 ENCOUNTER — IMMUNIZATION (OUTPATIENT)
Dept: FAMILY MEDICINE | Facility: CLINIC | Age: 61
End: 2021-09-27
Payer: COMMERCIAL

## 2021-09-27 DIAGNOSIS — Z23 NEED FOR VACCINATION: Primary | ICD-10-CM

## 2021-09-27 PROCEDURE — 91300 COVID-19, MRNA, LNP-S, PF, 30 MCG/0.3 ML DOSE VACCINE: CPT | Mod: PBBFAC | Performed by: FAMILY MEDICINE

## 2021-10-05 ENCOUNTER — OFFICE VISIT (OUTPATIENT)
Dept: URGENT CARE | Facility: CLINIC | Age: 61
End: 2021-10-05
Payer: COMMERCIAL

## 2021-10-05 VITALS
RESPIRATION RATE: 16 BRPM | WEIGHT: 217 LBS | DIASTOLIC BLOOD PRESSURE: 84 MMHG | TEMPERATURE: 98 F | SYSTOLIC BLOOD PRESSURE: 186 MMHG | HEIGHT: 64 IN | BODY MASS INDEX: 37.05 KG/M2 | OXYGEN SATURATION: 98 % | HEART RATE: 100 BPM

## 2021-10-05 DIAGNOSIS — T50.Z95A VACCINE REACTION, INITIAL ENCOUNTER: ICD-10-CM

## 2021-10-05 DIAGNOSIS — R52 GENERALIZED BODY ACHES: Primary | ICD-10-CM

## 2021-10-05 LAB
CTP QC/QA: YES
SARS-COV-2 RDRP RESP QL NAA+PROBE: NEGATIVE

## 2021-10-05 PROCEDURE — 99214 OFFICE O/P EST MOD 30 MIN: CPT | Mod: S$GLB,CS,, | Performed by: FAMILY MEDICINE

## 2021-10-05 PROCEDURE — 99214 PR OFFICE/OUTPT VISIT, EST, LEVL IV, 30-39 MIN: ICD-10-PCS | Mod: S$GLB,CS,, | Performed by: FAMILY MEDICINE

## 2021-10-05 PROCEDURE — U0002 COVID-19 LAB TEST NON-CDC: HCPCS | Mod: QW,S$GLB,, | Performed by: FAMILY MEDICINE

## 2021-10-05 PROCEDURE — U0002: ICD-10-PCS | Mod: QW,S$GLB,, | Performed by: FAMILY MEDICINE

## 2021-11-04 RX ORDER — ALPRAZOLAM 0.5 MG/1
0.5 TABLET ORAL 4 TIMES DAILY PRN
Qty: 100 TABLET | Refills: 1 | Status: SHIPPED | OUTPATIENT
Start: 2021-11-04 | End: 2021-12-27

## 2021-12-24 ENCOUNTER — PATIENT MESSAGE (OUTPATIENT)
Dept: ADMINISTRATIVE | Facility: HOSPITAL | Age: 61
End: 2021-12-24
Payer: COMMERCIAL

## 2021-12-27 RX ORDER — ALPRAZOLAM 0.5 MG/1
TABLET ORAL
Qty: 100 TABLET | Refills: 0 | Status: SHIPPED | OUTPATIENT
Start: 2021-12-27 | End: 2022-01-22 | Stop reason: SDUPTHER

## 2022-01-18 ENCOUNTER — PATIENT MESSAGE (OUTPATIENT)
Dept: ADMINISTRATIVE | Facility: HOSPITAL | Age: 62
End: 2022-01-18
Payer: COMMERCIAL

## 2022-01-22 NOTE — TELEPHONE ENCOUNTER
Last filled 12/27/21     Lov 7/2021. I am sending message reminding her she is past due for 6 mos ck.

## 2022-01-24 RX ORDER — ALPRAZOLAM 0.5 MG/1
0.5 TABLET ORAL 4 TIMES DAILY PRN
Qty: 100 TABLET | Refills: 0 | Status: SHIPPED | OUTPATIENT
Start: 2022-01-24 | End: 2022-02-17

## 2022-01-24 RX ORDER — ALPRAZOLAM 0.5 MG/1
0.5 TABLET ORAL 4 TIMES DAILY PRN
Qty: 100 TABLET | Refills: 0 | OUTPATIENT
Start: 2022-01-24

## 2022-02-17 RX ORDER — ALPRAZOLAM 0.5 MG/1
TABLET ORAL
Qty: 100 TABLET | Refills: 0 | Status: SHIPPED | OUTPATIENT
Start: 2022-02-17 | End: 2022-03-15

## 2022-03-08 ENCOUNTER — OFFICE VISIT (OUTPATIENT)
Dept: OBSTETRICS AND GYNECOLOGY | Facility: CLINIC | Age: 62
End: 2022-03-08
Payer: COMMERCIAL

## 2022-03-08 VITALS — BODY MASS INDEX: 37.77 KG/M2 | WEIGHT: 220 LBS | DIASTOLIC BLOOD PRESSURE: 76 MMHG | SYSTOLIC BLOOD PRESSURE: 116 MMHG

## 2022-03-08 DIAGNOSIS — Z90.721 S/P HYSTERECTOMY WITH OOPHORECTOMY: ICD-10-CM

## 2022-03-08 DIAGNOSIS — Z90.710 S/P HYSTERECTOMY WITH OOPHORECTOMY: ICD-10-CM

## 2022-03-08 DIAGNOSIS — Z01.419 ROUTINE GYNECOLOGICAL EXAMINATION: Primary | ICD-10-CM

## 2022-03-08 DIAGNOSIS — R23.2 HOT FLASHES: ICD-10-CM

## 2022-03-08 DIAGNOSIS — E11.9 DIABETES MELLITUS WITHOUT COMPLICATION: ICD-10-CM

## 2022-03-08 PROCEDURE — 99999 PR PBB SHADOW E&M-EST. PATIENT-LVL III: CPT | Mod: PBBFAC,,, | Performed by: OBSTETRICS & GYNECOLOGY

## 2022-03-08 PROCEDURE — 99396 PR PREVENTIVE VISIT,EST,40-64: ICD-10-PCS | Mod: S$GLB,,, | Performed by: OBSTETRICS & GYNECOLOGY

## 2022-03-08 PROCEDURE — 99999 PR PBB SHADOW E&M-EST. PATIENT-LVL III: ICD-10-PCS | Mod: PBBFAC,,, | Performed by: OBSTETRICS & GYNECOLOGY

## 2022-03-08 PROCEDURE — 99396 PREV VISIT EST AGE 40-64: CPT | Mod: S$GLB,,, | Performed by: OBSTETRICS & GYNECOLOGY

## 2022-03-08 NOTE — PROGRESS NOTES
Chief Complaint   Patient presents with    Well Woman     C/o hot flashes at night, swollen lymphnodes in crease of left right left     History of Present Illness: Carmen Hernandez is a 62 y.o. female that presents today 3/8/2022 for well gyn visit.    Past Medical History:   Diagnosis Date    Anxiety     Bilateral dry eyes     uses atears//    Cataract     Diabetes mellitus     LBSL 128 today---fluctuates    Diverticulosis     GERD (gastroesophageal reflux disease)     Hyperlipidemia     Hypertension     Irritable bowel syndrome     Nephrolithiasis     Severe obesity (BMI 35.0-39.9) with comorbidity        Past Surgical History:   Procedure Laterality Date    COLONOSCOPY  05/16/2013    PAM.   Diverticulosis in the sigmoid colon.  Tortuous colon.  Irritable bowel syndrome?.  Internal hemorrhoids.  Redundant colon. repeat in 5-6 years    COLONOSCOPY N/A 10/31/2018    Procedure: COLONOSCOPY;  Surgeon: Jr Santos Jr., MD;  Location: [facility];  Service: Endoscopy;  Laterality: N/A;    COLONOSCOPY W/ POLYPECTOMY  5/13/2009  Rohan SPENCER.   One 4 mm polyp in the transverse colon.  HYPERPLASTIC POLYP.    Diverticulosis sigmoid colon.    Tortuous colon.   Granularity hepatic flexure.     CYSTOSCOPY W/ STONE MANIPULATION      ENDOMETRIAL BIOPSY  X2    HYSTERECTOMY  2013    OOPHORECTOMY  2013    UPPER GASTROINTESTINAL ENDOSCOPY  09/26/2017    Dr. Santos       Current Outpatient Medications   Medication Sig Dispense Refill    alcohol swabs (BD ALCOHOL SWABS) PadM USE TO CHECK BLOOD GLUCOSE TWICE DAILY 200 each 0    ALPRAZolam (XANAX) 0.5 MG tablet TAKE 1 TABLET BY MOUTH FOUR TIMES DAILY AS NEEDED 100 tablet 0    blood sugar diagnostic (ONETOUCH VERIO TEST STRIPS) Strp USE TO TEST BLOOD SUGAR 3 TO 4 TIMES DAILY 200 strip 6    doxycycline (MONODOX) 50 MG Cap TAKE 1 CAPSULE BY MOUTH EVERY DAY 30 capsule 3    fluocinonide 0.05% (LIDEX) 0.05 % cream Apply topically 2 (two) times  daily. 30 g 1    JANUVIA 50 mg Tab TAKE 1 TABLET(50 MG) BY MOUTH EVERY DAY 30 tablet 6    lisinopriL (PRINIVIL,ZESTRIL) 40 MG tablet TAKE 1 TABLET(40 MG) BY MOUTH EVERY DAY 90 tablet 0    metFORMIN (GLUCOPHAGE) 500 MG tablet TAKE 2 TABLETS(1000 MG) BY MOUTH TWICE DAILY 360 tablet 0    omeprazole (PRILOSEC) 40 MG capsule Take 1 capsule (40 mg total) by mouth once daily. 90 capsule 3    pravastatin (PRAVACHOL) 40 MG tablet TAKE 1 TABLET BY MOUTH ONCE EVERY DAY 90 tablet 3    empagliflozin (JARDIANCE) 10 mg tablet Take 1 tablet (10 mg total) by mouth once daily. (Patient not taking: Reported on 3/8/2022) 30 tablet 6    ONETOUCH VERIO SYNC kit USE TO TEST BLOOD GLUOCSE TWICE DAILY 1 each 0     No current facility-administered medications for this visit.       Review of patient's allergies indicates:   Allergen Reactions    Demerol [meperidine] Hives    Iodinated contrast media Hives    Bactrim [sulfamethoxazole-trimethoprim] Other (See Comments)     Other reaction(s): blotchy skin    Ciprofloxacin Other (See Comments)     Other reaction(s): blotchy skin    Erythromycin Other (See Comments)     tachycardia       Family History   Problem Relation Age of Onset    Cataracts Mother     Heart disease Mother     Hypertension Mother     Diabetes Father     Hypertension Father     Diabetes Brother     Hypertension Brother     Macular degeneration Neg Hx     Retinal detachment Neg Hx     Strabismus Neg Hx     Stroke Neg Hx     Thyroid disease Neg Hx     Glaucoma Neg Hx     Cancer Neg Hx     Amblyopia Neg Hx     Blindness Neg Hx     Breast cancer Neg Hx     Colon cancer Neg Hx     Colon polyps Neg Hx     Crohn's disease Neg Hx     Ulcerative colitis Neg Hx     Stomach cancer Neg Hx     Esophageal cancer Neg Hx     Celiac disease Neg Hx        Social History     Socioeconomic History    Marital status:    Tobacco Use    Smoking status: Former Smoker     Quit date: 8/12/2013     Years  since quittin.5    Smokeless tobacco: Never Used   Substance and Sexual Activity    Alcohol use: No    Drug use: No    Sexual activity: Not Currently     Birth control/protection: Post-menopausal       OB History    Para Term  AB Living   2 2 2     2   SAB IAB Ectopic Multiple Live Births           2      # Outcome Date GA Lbr Villa/2nd Weight Sex Delivery Anes PTL Lv   2 Term  40w0d  2.722 kg (6 lb) M Vag-Spont EPI  BERNABE      Birth Comments: no complications   1 Term  40w0d  2.722 kg (6 lb) F Vag-Spont EPI  BERNABE      Birth Comments: no complications       Review of Symptoms:  GENERAL: Denies weight gain or weight loss. Feeling well overall.   SKIN: Denies rash or lesions.   HEAD: Denies head injury or headache.   NODES: Denies enlarged lymph nodes.   CHEST: Denies chest pain or shortness of breath.   CARDIOVASCULAR: Denies palpitations or left sided chest pain.   ABDOMEN: No abdominal pain, constipation, diarrhea, nausea, vomiting or rectal bleeding.   URINARY: No frequency, dysuria, hematuria, or burning on urination.  HEMATOLOGIC: No easy bruisability or excessive bleeding.   MUSCULOSKELETAL: Denies joint pain or swelling.     /76   Wt 99.8 kg (220 lb 0.3 oz)   Physical Exam:  APPEARANCE: Well nourished, well developed, in no acute distress.  SKIN: Normal skin turgor, no lesions.  NECK: Neck symmetric without masses   RESPIRATORY: Normal respiratory effort with no retractions or use of accessory muscles  CARDIOVASCULAR: Peripheral vascular system with no swelling no varicosities and palpation of pulses normal  LYMPHATIC: No enlargements of the lymph nodes noted in the neck, axillae, or groin  ABDOMEN: Soft. No tenderness or masses. No hepatosplenomegaly. No hernias.  BREASTS: Symmetrical, no skin changes or visible lesions. No palpable masses, nipple discharge or adenopathy bilaterally.  PELVIC: Normal external female genitalia without lesions. Normal hair distribution. Adequate  perineal body, normal urethral meatus. Urethra with no masses.  Bladder nontender. Vagina moist and well rugated without lesions or discharge. No significant cystocele or rectocele.  Adnexa without masses or tenderness. Urethra and bladder normal.   EXTREMITIES: No clubbing cyanosis or edema.    ASSESSMENT/PLAN:  Routine gynecological examination    Hot flashes  Comments:  we discussed risks, benefits and indications of HRT.  stroke risk with DM and do not recommend estrogen HRT    Diabetes mellitus without complication    S/P hysterectomy with oophorectomy          Patient was counseled today on Pap guidelines. We discussed the discontinuing the pap smear after hysterectomy except in certain high risk cases.  We discussed the need for pelvic exams.   We discussed STD screening if at high risk for an STD.  We discussed breast cancer screening with mammograms every other year after the age of 40 and annually after the age of 50.    We discussed colon cancer screening.   Osteoporosis screening with the Dexa Bone Scan discussed when indicated.   She will see her PCP for other health maintenance.       FOLLOW-UP:prn

## 2022-03-09 ENCOUNTER — HOSPITAL ENCOUNTER (OUTPATIENT)
Dept: RADIOLOGY | Facility: HOSPITAL | Age: 62
Discharge: HOME | End: 2022-03-09
Attending: OBSTETRICS & GYNECOLOGY
Payer: COMMERCIAL

## 2022-03-09 DIAGNOSIS — Z12.31 ENCOUNTER FOR SCREENING MAMMOGRAM FOR MALIGNANT NEOPLASM OF BREAST: ICD-10-CM

## 2022-03-09 PROCEDURE — 77063 MAMMO DIGITAL SCREENING BILAT WITH TOMO: ICD-10-PCS | Mod: 26,,, | Performed by: RADIOLOGY

## 2022-03-09 PROCEDURE — 77067 MAMMO DIGITAL SCREENING BILAT WITH TOMO: ICD-10-PCS | Mod: 26,,, | Performed by: RADIOLOGY

## 2022-03-09 PROCEDURE — 77063 BREAST TOMOSYNTHESIS BI: CPT | Mod: 26,,, | Performed by: RADIOLOGY

## 2022-03-09 PROCEDURE — 77063 BREAST TOMOSYNTHESIS BI: CPT | Mod: TC,PO

## 2022-03-09 PROCEDURE — 77067 SCR MAMMO BI INCL CAD: CPT | Mod: 26,,, | Performed by: RADIOLOGY

## 2022-03-15 RX ORDER — ALPRAZOLAM 0.5 MG/1
TABLET ORAL
Qty: 100 TABLET | Refills: 0 | Status: SHIPPED | OUTPATIENT
Start: 2022-03-15 | End: 2022-04-12 | Stop reason: SDUPTHER

## 2022-03-25 ENCOUNTER — PATIENT MESSAGE (OUTPATIENT)
Dept: ADMINISTRATIVE | Facility: HOSPITAL | Age: 62
End: 2022-03-25
Payer: COMMERCIAL

## 2022-04-04 ENCOUNTER — PATIENT MESSAGE (OUTPATIENT)
Dept: ADMINISTRATIVE | Facility: HOSPITAL | Age: 62
End: 2022-04-04
Payer: COMMERCIAL

## 2022-04-04 DIAGNOSIS — E11.9 TYPE 2 DIABETES MELLITUS WITHOUT COMPLICATION, UNSPECIFIED WHETHER LONG TERM INSULIN USE: ICD-10-CM

## 2022-05-02 ENCOUNTER — TELEPHONE (OUTPATIENT)
Dept: INTERNAL MEDICINE | Facility: CLINIC | Age: 62
End: 2022-05-02
Payer: COMMERCIAL

## 2022-05-02 NOTE — TELEPHONE ENCOUNTER
----- Message from Cherry Akins sent at 5/2/2022  2:32 PM CDT -----  Contact: Pt [phone]  Patient is calling for Medical Advice regarding:--lump on the back of the tigh on the left leg and painful--      How long has patient had these symptoms:--couple of months--    Pharmacy name and phone#:/  [facility] #[id number] - [place] [address] [address] AT [place] [address] [address]  [facility] [address] [place] [address]  Phone: [phone] Fax: [phone]      Would like response via [employer]:--Call back--    Comments:Pt calling to see if she can be fit in sooner then 7/1 for the symptoms listed above? Please call to advise.

## 2022-05-02 NOTE — TELEPHONE ENCOUNTER
Spoke with pt to offer an appt on 5/7/22 with dr. Bentley.    Declined and agreeable for his next available on 6/24/22 at 1 pm.

## 2022-05-06 RX ORDER — ALPRAZOLAM 0.5 MG/1
TABLET ORAL
Qty: 100 TABLET | Refills: 0 | Status: SHIPPED | OUTPATIENT
Start: 2022-05-06 | End: 2022-05-06 | Stop reason: SDUPTHER

## 2022-05-06 RX ORDER — ALPRAZOLAM 0.5 MG/1
TABLET ORAL
Qty: 100 TABLET | OUTPATIENT
Start: 2022-05-06

## 2022-05-09 ENCOUNTER — PATIENT MESSAGE (OUTPATIENT)
Dept: ADMINISTRATIVE | Facility: HOSPITAL | Age: 62
End: 2022-05-09
Payer: COMMERCIAL

## 2022-05-09 ENCOUNTER — PATIENT OUTREACH (OUTPATIENT)
Dept: ADMINISTRATIVE | Facility: HOSPITAL | Age: 62
End: 2022-05-09
Payer: COMMERCIAL

## 2022-05-09 RX ORDER — ALPRAZOLAM 0.5 MG/1
0.5 TABLET ORAL 4 TIMES DAILY PRN
Qty: 100 TABLET | Refills: 0 | Status: SHIPPED | OUTPATIENT
Start: 2022-05-09 | End: 2022-06-21 | Stop reason: SDUPTHER

## 2022-05-17 LAB
COMMENTS: ABNORMAL
EST. AVERAGE GLUCOSE BLD GHB EST-MCNC: 217 MG/DL
HBA1C MFR BLD: 9.2 % (ref 4.8–5.6)

## 2022-05-23 ENCOUNTER — PATIENT MESSAGE (OUTPATIENT)
Dept: URGENT CARE | Facility: CLINIC | Age: 62
End: 2022-05-23
Payer: COMMERCIAL

## 2022-05-23 ENCOUNTER — OFFICE VISIT (OUTPATIENT)
Dept: URGENT CARE | Facility: CLINIC | Age: 62
End: 2022-05-23
Payer: COMMERCIAL

## 2022-05-23 VITALS
RESPIRATION RATE: 16 BRPM | BODY MASS INDEX: 37.05 KG/M2 | DIASTOLIC BLOOD PRESSURE: 101 MMHG | HEART RATE: 102 BPM | HEIGHT: 64 IN | OXYGEN SATURATION: 98 % | TEMPERATURE: 98 F | WEIGHT: 217 LBS | SYSTOLIC BLOOD PRESSURE: 185 MMHG

## 2022-05-23 DIAGNOSIS — I10 HYPERTENSION, UNSPECIFIED TYPE: Primary | ICD-10-CM

## 2022-05-23 PROCEDURE — 99214 OFFICE O/P EST MOD 30 MIN: CPT | Mod: S$GLB,,, | Performed by: FAMILY MEDICINE

## 2022-05-23 PROCEDURE — 93005 ELECTROCARDIOGRAM TRACING: CPT | Mod: S$GLB,,, | Performed by: FAMILY MEDICINE

## 2022-05-23 PROCEDURE — 99214 PR OFFICE/OUTPT VISIT, EST, LEVL IV, 30-39 MIN: ICD-10-PCS | Mod: S$GLB,,, | Performed by: FAMILY MEDICINE

## 2022-05-23 PROCEDURE — 93010 ELECTROCARDIOGRAM REPORT: CPT | Mod: S$GLB,,, | Performed by: INTERNAL MEDICINE

## 2022-05-23 PROCEDURE — 71046 X-RAY EXAM CHEST 2 VIEWS: CPT | Mod: S$GLB,,, | Performed by: RADIOLOGY

## 2022-05-23 PROCEDURE — 71046 XR CHEST PA AND LATERAL: ICD-10-PCS | Mod: S$GLB,,, | Performed by: RADIOLOGY

## 2022-05-23 PROCEDURE — 93005 EKG 12-LEAD: ICD-10-PCS | Mod: S$GLB,,, | Performed by: FAMILY MEDICINE

## 2022-05-23 PROCEDURE — 93010 EKG 12-LEAD: ICD-10-PCS | Mod: S$GLB,,, | Performed by: INTERNAL MEDICINE

## 2022-05-23 NOTE — PROGRESS NOTES
"Subjective:       Patient ID: Carmen Hernandez is a 62 y.o. female.    Vitals:  height is 5' 4" (1.626 m) and weight is 98.4 kg (217 lb). Her oral temperature is 97.6 °F (36.4 °C). Her blood pressure is 185/101 (abnormal) and her pulse is 102. Her respiration is 16 and oxygen saturation is 98%.     Chief Complaint: Hypertension    Pt presents today with high blood pressure.  Pt took blood pressure today and it was 155/97.  Pt states she feels nervous and has dull aching in left arm.  Pt also complains of weakness in legs.     Hypertension  This is a new problem. The current episode started today. The problem is unchanged. Associated symptoms include anxiety. Treatments tried: lisinopro. The current treatment provides no improvement.     ROS    Objective:      Physical Exam   Constitutional: She is oriented to person, place, and time. She appears well-developed. She is cooperative.  Non-toxic appearance. She does not appear ill. No distress.   HENT:   Head: Normocephalic and atraumatic.   Ears:   Right Ear: Hearing and external ear normal.   Left Ear: Hearing and external ear normal.   Nose: Nose normal. No mucosal edema, rhinorrhea or nasal deformity. No epistaxis. Right sinus exhibits no maxillary sinus tenderness and no frontal sinus tenderness. Left sinus exhibits no maxillary sinus tenderness and no frontal sinus tenderness.   Mouth/Throat: Uvula is midline, oropharynx is clear and moist and mucous membranes are normal. No trismus in the jaw. Normal dentition. No uvula swelling. No posterior oropharyngeal erythema.   Eyes: Conjunctivae and lids are normal. Right eye exhibits no discharge. Left eye exhibits no discharge. No scleral icterus.   Neck: Trachea normal and phonation normal. Neck supple.   Cardiovascular: Normal rate, regular rhythm, normal heart sounds and normal pulses.   Pulmonary/Chest: Effort normal and breath sounds normal. No respiratory distress.   Abdominal: Normal appearance and bowel " sounds are normal. She exhibits no distension, no pulsatile midline mass and no mass. Soft. There is no abdominal tenderness.   Musculoskeletal: Normal range of motion.         General: No deformity. Normal range of motion.   Neurological: no focal deficit. She is alert and oriented to person, place, and time. She displays no weakness and normal reflexes. No cranial nerve deficit. She exhibits normal muscle tone. Coordination and gait normal.   Skin: Skin is warm, dry, intact, not diaphoretic and not pale.   Psychiatric: Her speech is normal and behavior is normal. Judgment and thought content normal.   Nursing note and vitals reviewed.        Assessment:       1. Hypertension, unspecified type          Plan:     pt sx started after having BP checked at dentist office- 170s/100 even after taking a xanax. No other sx currently, but has c/o left arm numbness and left jaw/neck tightness. She does mention that she is worried bc her  had a heart attack.  Has checked BP over weekend and has had high and normal readings. Mentions that she reyes s have inc stress and recently started taking different formulation of lisinopril. No CP. d/w pt regarding sx, h/o DM2, HTN and family h/o of paternal MI- ER would be appropriate place to perform further testing. Pt  drove today. Advised either [facility] or [facility].     EKG: NSR rate 95, normal axis. ST changes compared to previous EKG 12 years ago.     Hypertension, unspecified type  -     XR CHEST PA AND LATERAL; Future; Expected date: 2022        XR CHEST PA AND LATERAL    Result Date: 2022  EXAMINATION: XR CHEST PA AND LATERAL CLINICAL HISTORY: Essential (primary) hypertension TECHNIQUE: PA and lateral views of the chest were performed. COMPARISON: 10/03/2012 FINDINGS: Lungs are clear.Normal cardiomediastinal silhouette.Normal pulmonary vascular distribution.No pleural effusion or pneumothorax.No acute osseous abnormality. Electronically  signed by: Jean Meneses Date:    05/23/2022 Time:    15:07

## 2022-05-24 ENCOUNTER — TELEPHONE (OUTPATIENT)
Dept: INTERNAL MEDICINE | Facility: CLINIC | Age: 62
End: 2022-05-24
Payer: COMMERCIAL

## 2022-05-24 NOTE — TELEPHONE ENCOUNTER
----- Message from Patti Jackson sent at 5/24/2022  9:18 AM CDT -----  Contact: [phone]  Pt visited Er on 5/23/22 for elevated bp! Doctor at ED reccommended pt f/u with her pcp regarding her lisinopriL (PRINIVIL,ZESTRIL) 40 MG tablet med

## 2022-05-27 ENCOUNTER — OFFICE VISIT (OUTPATIENT)
Dept: INTERNAL MEDICINE | Facility: CLINIC | Age: 62
End: 2022-05-27
Payer: COMMERCIAL

## 2022-05-27 ENCOUNTER — LAB VISIT (OUTPATIENT)
Dept: LAB | Facility: HOSPITAL | Age: 62
End: 2022-05-27
Attending: NURSE PRACTITIONER
Payer: COMMERCIAL

## 2022-05-27 VITALS
DIASTOLIC BLOOD PRESSURE: 74 MMHG | WEIGHT: 215.63 LBS | HEART RATE: 102 BPM | SYSTOLIC BLOOD PRESSURE: 142 MMHG | TEMPERATURE: 98 F | BODY MASS INDEX: 36.81 KG/M2 | OXYGEN SATURATION: 98 % | HEIGHT: 64 IN

## 2022-05-27 DIAGNOSIS — E11.69 TYPE 2 DIABETES MELLITUS WITH OTHER SPECIFIED COMPLICATION, WITHOUT LONG-TERM CURRENT USE OF INSULIN: ICD-10-CM

## 2022-05-27 DIAGNOSIS — I10 HTN (HYPERTENSION), BENIGN: ICD-10-CM

## 2022-05-27 DIAGNOSIS — I10 HTN (HYPERTENSION), BENIGN: Primary | ICD-10-CM

## 2022-05-27 LAB
CREAT SERPL-MCNC: 0.7 MG/DL (ref 0.5–1.4)
EST. GFR  (AFRICAN AMERICAN): >60 ML/MIN/1.73 M^2
EST. GFR  (NON AFRICAN AMERICAN): >60 ML/MIN/1.73 M^2

## 2022-05-27 PROCEDURE — 99999 PR PBB SHADOW E&M-EST. PATIENT-LVL IV: ICD-10-PCS | Mod: PBBFAC,,, | Performed by: NURSE PRACTITIONER

## 2022-05-27 PROCEDURE — 36415 COLL VENOUS BLD VENIPUNCTURE: CPT | Mod: PO | Performed by: NURSE PRACTITIONER

## 2022-05-27 PROCEDURE — 82565 ASSAY OF CREATININE: CPT | Performed by: NURSE PRACTITIONER

## 2022-05-27 PROCEDURE — 99213 OFFICE O/P EST LOW 20 MIN: CPT | Mod: S$GLB,,, | Performed by: NURSE PRACTITIONER

## 2022-05-27 PROCEDURE — 99999 PR PBB SHADOW E&M-EST. PATIENT-LVL IV: CPT | Mod: PBBFAC,,, | Performed by: NURSE PRACTITIONER

## 2022-05-27 PROCEDURE — 99213 PR OFFICE/OUTPT VISIT, EST, LEVL III, 20-29 MIN: ICD-10-PCS | Mod: S$GLB,,, | Performed by: NURSE PRACTITIONER

## 2022-05-27 RX ORDER — HYDROCHLOROTHIAZIDE 12.5 MG/1
12.5 TABLET ORAL DAILY
Qty: 30 TABLET | Refills: 1 | Status: SHIPPED | OUTPATIENT
Start: 2022-05-27 | End: 2022-06-24

## 2022-05-27 RX ORDER — LISINOPRIL 40 MG/1
40 TABLET ORAL DAILY
Qty: 90 TABLET | Refills: 3 | Status: SHIPPED | OUTPATIENT
Start: 2022-05-27 | End: 2022-10-03

## 2022-05-27 RX ORDER — METFORMIN HYDROCHLORIDE 1000 MG/1
1000 TABLET ORAL 2 TIMES DAILY WITH MEALS
Qty: 180 TABLET | Refills: 3 | Status: SHIPPED | OUTPATIENT
Start: 2022-05-27 | End: 2022-06-06

## 2022-05-27 NOTE — PROGRESS NOTES
Subjective:       Patient ID: Carmen Hernandez is a 62 y.o. female.    Chief Complaint: Hospital Follow Up (HTN; Chest pain), Arm Pain (Left arm pain), and Back Pain (Upper back )      Patient is a 62 y.o. female who traditionally follows with LUKAS Bentley MD presenting today for follow up.    Patient was seen at dentist on 5/19 for dental procedure where she was given nitrous oxide. They noted elevated blood pressure.   Had been monitoring blood pressure at home and noted her systolic was typically in the 90s.     Presented to  for high blood pressure and chest pain on 5/23. At that time her EKG was abnormal but BNP and Troponins were normal.     Has left hand and upper back pain discomfort. Increased anxiety recently. Notes sinus headache to left side that she is taking Benadryl for.     Since leaving ED has not monitored BP.     Review of patient's allergies indicates:   Allergen Reactions    Demerol [meperidine] Hives    Iodinated contrast media Hives    Bactrim [sulfamethoxazole-trimethoprim] Other (See Comments)     Other reaction(s): blotchy skin    Ciprofloxacin Other (See Comments)     Other reaction(s): blotchy skin    Erythromycin Other (See Comments)     tachycardia       Medication List with Changes/Refills   New Medications    HYDROCHLOROTHIAZIDE (HYDRODIURIL) 12.5 MG TAB    Take 1 tablet (12.5 mg total) by mouth once daily.    SITAGLIPTIN (JANUVIA) 25 MG TAB    Take 1 tablet (25 mg total) by mouth once daily.   Current Medications    ALCOHOL SWABS (BD ALCOHOL SWABS) PADM    USE TO CHECK BLOOD GLUCOSE TWICE DAILY    ALPRAZOLAM (XANAX) 0.5 MG TABLET    Take 1 tablet (0.5 mg total) by mouth 4 (four) times daily as needed.    BLOOD SUGAR DIAGNOSTIC (ONETOUCH VERIO TEST STRIPS) STRP    USE TO TEST BLOOD SUGAR 3 TO 4 TIMES DAILY    DOXYCYCLINE (MONODOX) 50 MG CAP    TAKE 1 CAPSULE BY MOUTH EVERY DAY    FLUOCINONIDE 0.05% (LIDEX) 0.05 % CREAM    Apply topically 2 (two) times daily.     "OMEPRAZOLE (PRILOSEC) 40 MG CAPSULE    Take 1 capsule (40 mg total) by mouth once daily.    [employer] VERIO SYNC KIT    USE TO TEST BLOOD GLUOCSE TWICE DAILY    PRAVASTATIN (PRAVACHOL) 40 MG TABLET    TAKE 1 TABLET BY MOUTH ONCE EVERY DAY   Changed and/or Refilled Medications    Modified Medication Previous Medication    LISINOPRIL (PRINIVIL,ZESTRIL) 40 MG TABLET lisinopriL (PRINIVIL,ZESTRIL) 40 MG tablet       Take 1 tablet (40 mg total) by mouth once daily.    TAKE 1 TABLET(40 MG) BY MOUTH EVERY DAY    METFORMIN (GLUCOPHAGE) 1000 MG TABLET metFORMIN (GLUCOPHAGE) 500 MG tablet       Take 1 tablet (1,000 mg total) by mouth 2 (two) times daily with meals.    TAKE 2 TABLETS(1000 MG) BY MOUTH TWICE DAILY   Discontinued Medications    EMPAGLIFLOZIN (JARDIANCE) 10 MG TABLET    Take 1 tablet (10 mg total) by mouth once daily.    JANUVIA 50 MG TAB    TAKE 1 TABLET(50 MG) BY MOUTH EVERY DAY     Medical, social and surgical history has been reviewed with the patient.      Review of Systems   Respiratory: Positive for shortness of breath.    Cardiovascular: Positive for chest pain. Negative for leg swelling.   Musculoskeletal: Positive for back pain.   Neurological: Positive for headaches.   Psychiatric/Behavioral: The patient is nervous/anxious.        Objective:   BP (!) 142/74 (BP Location: Right arm, Patient Position: Sitting, BP Method: Large (Manual))   Pulse 102   Temp 97.7 °F (36.5 °C) (Temporal)   Ht 5' 4" (1.626 m)   Wt 97.8 kg (215 lb 9.8 oz)   SpO2 98%   BMI 37.01 kg/m²     BP Readings from Last 6 Encounters:   05/27/22 (!) 142/74   05/23/22 (!) 164/75   05/23/22 (!) 185/101   03/08/22 116/76   10/05/21 (!) 186/84   07/02/21 130/84     Physical Exam  Vitals reviewed.   Constitutional:       Appearance: Normal appearance.   HENT:      Head: Normocephalic and atraumatic.   Cardiovascular:      Rate and Rhythm: Normal rate and regular rhythm.      Heart sounds: Normal heart sounds. No murmur heard.  Pulmonary: "      Effort: Pulmonary effort is normal.      Breath sounds: Normal breath sounds. No wheezing.   Skin:     General: Skin is warm and dry.   Neurological:      Mental Status: She is alert and oriented to person, place, and time.       Last Labs:  Glucose   Date Value Ref Range Status   05/23/2022 217 (H) 70 - 110 mg/dL Final     Comment:     The ADA recommends the following guidelines for fasting glucose:    Normal:       less than 100 mg/dL    Prediabetes:  100 mg/dL to 125 mg/dL    Diabetes:     126 mg/dL or higher     06/25/2021 183 (H) 70 - 110 mg/dL Final     BUN   Date Value Ref Range Status   05/23/2022 12 7 - 18 mg/dL Final   06/25/2021 14 8 - 23 mg/dL Final     Creatinine   Date Value Ref Range Status   05/23/2022 0.49 (L) 0.50 - 1.40 mg/dL Final   06/25/2021 0.7 0.5 - 1.4 mg/dL Final     Potassium   Date Value Ref Range Status   05/23/2022 4.0 3.5 - 5.1 mmol/L Final   06/25/2021 4.3 3.5 - 5.1 mmol/L Final     Cholesterol   Date Value Ref Range Status   06/25/2021 173 120 - 199 mg/dL Final     Comment:     The National Cholesterol Education Program (NCEP) has set the  following guidelines (reference ranges) for Cholesterol:  Optimal.....................<200 mg/dL  Borderline High.............200-239 mg/dL  High........................> or = 240 mg/dL     10/14/2020 184 120 - 199 mg/dL Final     Comment:     The National Cholesterol Education Program (NCEP) has set the  following guidelines (reference ranges) for Cholesterol:  Optimal.....................<200 mg/dL  Borderline High.............200-239 mg/dL  High........................> or = 240 mg/dL       Hemoglobin A1C   Date Value Ref Range Status   06/25/2021 7.6 (H) 4.0 - 5.6 % Final     Comment:     ADA Screening Guidelines:  5.7-6.4%  Consistent with prediabetes  >or=6.5%  Consistent with diabetes    High levels of fetal hemoglobin interfere with the HbA1C  assay. Heterozygous hemoglobin variants (HbS, HgC, etc)do  not significantly interfere with  this assay.   However, presence of multiple variants may affect accuracy.     10/14/2020 7.5 (H) 4.0 - 5.6 % Final     Comment:     ADA Screening Guidelines:  5.7-6.4%  Consistent with prediabetes  >or=6.5%  Consistent with diabetes  High levels of fetal hemoglobin interfere with the HbA1C  assay. Heterozygous hemoglobin variants (HbS, HgC, etc)do  not significantly interfere with this assay.   However, presence of multiple variants may affect accuracy.       Hemoglobin   Date Value Ref Range Status   05/23/2022 14.7 12.0 - 16.0 g/dL Final   06/25/2021 13.2 12.0 - 16.0 g/dL Final     Hematocrit   Date Value Ref Range Status   05/23/2022 44.6 37.0 - 48.5 % Final   06/25/2021 40.6 37.0 - 48.5 % Final     I have reviewed the following:     Details / Date    [x]   Labs     []   Micro     []   Pathology     []   Imaging     []   Cardiology Procedures     []   Other        Assessment and Plan:     1. HTN (hypertension), benign    - lisinopriL (PRINIVIL,ZESTRIL) 40 MG tablet; Take 1 tablet (40 mg total) by mouth once daily.  Dispense: 90 tablet; Refill: 3  - hydroCHLOROthiazide (HYDRODIURIL) 12.5 MG Tab; Take 1 tablet (12.5 mg total) by mouth once daily.  Dispense: 30 tablet; Refill: 1  - Echo; Future  - Creatinine, Serum; Future  - Comprehensive Metabolic Panel; Future    2. Type 2 diabetes mellitus with other specified complication, without long-term current use of insulin    - SITagliptin (JANUVIA) 25 MG Tab; Take 1 tablet (25 mg total) by mouth once daily.  Dispense: 30 tablet; Refill: 11  - metFORMIN (GLUCOPHAGE) 1000 MG tablet; Take 1 tablet (1,000 mg total) by mouth 2 (two) times daily with meals.  Dispense: 180 tablet; Refill: 3    Patient to monitor glucose and blood pressure at home over the weekend and call with readings on Monday. Labs today and in 1 week to assess kidney function on new medications. To keep follow up with Dr. Bentley.

## 2022-05-27 NOTE — PATIENT INSTRUCTIONS
Continue Lisinopril daily and add on hydrochlorothiazide (HCTZ) for blood pressure. Monitor your blood pressure daily.     Start Januvia 25 mg in the morning. Continue Metformin 1,000 mg twice daily. Monitor sugars.

## 2022-05-30 ENCOUNTER — PATIENT MESSAGE (OUTPATIENT)
Dept: INTERNAL MEDICINE | Facility: CLINIC | Age: 62
End: 2022-05-30
Payer: COMMERCIAL

## 2022-06-06 ENCOUNTER — TELEPHONE (OUTPATIENT)
Dept: INTERNAL MEDICINE | Facility: CLINIC | Age: 62
End: 2022-06-06
Payer: COMMERCIAL

## 2022-06-06 RX ORDER — METFORMIN HYDROCHLORIDE 500 MG/1
TABLET ORAL
Qty: 360 TABLET | Refills: 1 | Status: SHIPPED | OUTPATIENT
Start: 2022-06-06 | End: 2022-12-05

## 2022-06-06 NOTE — TELEPHONE ENCOUNTER
----- Message from Treva Rogers sent at 6/6/2022  3:19 PM CDT -----  Contact: [phone]  Pt called to advise that she was seen by the NP and her metformin was increased to take two 1000 mg tablet twice daily. She says the pill is now too big and she would like to switch back to the 500 mg pill two in the morning and two in the afternoon. Please Advise         [id number] - [place] [address] & [address] [address]  Phone: [phone] Fax: [phone]

## 2022-06-06 NOTE — TELEPHONE ENCOUNTER
Spoke with pt to advise.    Verbalized understanding and will complete the 1000mg tabs of Metformin by cutting them in half.  Once completed, she will call again for the 500 mg tabs of Metformin.

## 2022-06-06 NOTE — TELEPHONE ENCOUNTER
We can send in the 500mg tablet.  She can also finish her current 1000mg supply by cutting tabs in half , taking two half tabletes BID.  Let me know if she wants new Rx now

## 2022-06-14 ENCOUNTER — HOSPITAL ENCOUNTER (OUTPATIENT)
Dept: CARDIOLOGY | Facility: HOSPITAL | Age: 62
Discharge: HOME | End: 2022-06-14
Attending: NURSE PRACTITIONER
Payer: COMMERCIAL

## 2022-06-14 VITALS
HEART RATE: 72 BPM | SYSTOLIC BLOOD PRESSURE: 140 MMHG | WEIGHT: 215 LBS | HEIGHT: 64 IN | DIASTOLIC BLOOD PRESSURE: 80 MMHG | BODY MASS INDEX: 36.7 KG/M2

## 2022-06-14 DIAGNOSIS — I10 HTN (HYPERTENSION), BENIGN: ICD-10-CM

## 2022-06-14 LAB
ASCENDING AORTA: 2.33 CM
AV INDEX (PROSTH): 0.64
AV MEAN GRADIENT: 6 MMHG
AV PEAK GRADIENT: 11 MMHG
AV VALVE AREA: 1.96 CM2
AV VELOCITY RATIO: 0.74
BSA FOR ECHO PROCEDURE: 2.1 M2
CV ECHO LV RWT: 0.46 CM
DOP CALC AO PEAK VEL: 1.64 M/S
DOP CALC AO VTI: 38.3 CM
DOP CALC LVOT AREA: 3.1 CM2
DOP CALC LVOT DIAMETER: 1.98 CM
DOP CALC LVOT PEAK VEL: 1.22 M/S
DOP CALC LVOT STROKE VOLUME: 75.09 CM3
DOP CALC RVOT PEAK VEL: 0.82 M/S
DOP CALC RVOT VTI: 19.8 CM
DOP CALCLVOT PEAK VEL VTI: 24.4 CM
E WAVE DECELERATION TIME: 191.33 MSEC
E/A RATIO: 0.93
E/E' RATIO: 11.6 M/S
ECHO LV POSTERIOR WALL: 0.81 CM (ref 0.6–1.1)
EJECTION FRACTION: 65 %
FRACTIONAL SHORTENING: 41 % (ref 28–44)
INTERVENTRICULAR SEPTUM: 0.76 CM (ref 0.6–1.1)
IVRT: 97.05 MSEC
LA MAJOR: 4.49 CM
LA MINOR: 2.97 CM
LA WIDTH: 2.97 CM
LEFT ATRIUM SIZE: 3.6 CM
LEFT ATRIUM VOLUME INDEX MOD: 13.7 ML/M2
LEFT ATRIUM VOLUME INDEX: 16.1 ML/M2
LEFT ATRIUM VOLUME MOD: 27.77 CM3
LEFT ATRIUM VOLUME: 32.49 CM3
LEFT INTERNAL DIMENSION IN SYSTOLE: 2.11 CM (ref 2.1–4)
LEFT VENTRICLE DIASTOLIC VOLUME INDEX: 26.07 ML/M2
LEFT VENTRICLE DIASTOLIC VOLUME: 52.67 ML
LEFT VENTRICLE MASS INDEX: 37 G/M2
LEFT VENTRICLE SYSTOLIC VOLUME INDEX: 10.5 ML/M2
LEFT VENTRICLE SYSTOLIC VOLUME: 21.12 ML
LEFT VENTRICULAR INTERNAL DIMENSION IN DIASTOLE: 3.55 CM (ref 3.5–6)
LEFT VENTRICULAR MASS: 75.05 G
LV LATERAL E/E' RATIO: 10.88 M/S
LV SEPTAL E/E' RATIO: 12.43 M/S
LVOT MG: 3.09 MMHG
LVOT MV: 0.82 CM/S
MV A" WAVE DURATION": 10.28 MSEC
MV PEAK A VEL: 0.94 M/S
MV PEAK E VEL: 0.87 M/S
MV STENOSIS PRESSURE HALF TIME: 55.49 MS
MV VALVE AREA P 1/2 METHOD: 3.97 CM2
PISA TR MAX VEL: 1.68 M/S
PULM VEIN S/D RATIO: 1.86
PV MEAN GRADIENT: 1.54 MMHG
PV MV: 0.81 M/S
PV PEAK D VEL: 0.33 M/S
PV PEAK S VEL: 0.61 M/S
PV PEAK VELOCITY: 1.13 CM/S
RA MAJOR: 3.99 CM
RA PRESSURE: 3 MMHG
RA WIDTH: 2.15 CM
RIGHT VENTRICULAR END-DIASTOLIC DIMENSION: 2.06 CM
SINUS: 2.4 CM
STJ: 2.08 CM
TDI LATERAL: 0.08 M/S
TDI SEPTAL: 0.07 M/S
TDI: 0.08 M/S
TR MAX PG: 11 MMHG
TRICUSPID ANNULAR PLANE SYSTOLIC EXCURSION: 2.28 CM
TV REST PULMONARY ARTERY PRESSURE: 14 MMHG

## 2022-06-14 PROCEDURE — 93306 TTE W/DOPPLER COMPLETE: CPT

## 2022-06-14 PROCEDURE — 93306 ECHO (CUPID ONLY): ICD-10-PCS | Mod: 26,,, | Performed by: INTERNAL MEDICINE

## 2022-06-14 PROCEDURE — 93306 TTE W/DOPPLER COMPLETE: CPT | Mod: 26,,, | Performed by: INTERNAL MEDICINE

## 2022-06-21 RX ORDER — ALPRAZOLAM 0.5 MG/1
0.5 TABLET ORAL 4 TIMES DAILY PRN
Qty: 100 TABLET | Refills: 0 | OUTPATIENT
Start: 2022-06-21

## 2022-06-24 ENCOUNTER — OFFICE VISIT (OUTPATIENT)
Dept: INTERNAL MEDICINE | Facility: CLINIC | Age: 62
End: 2022-06-24
Payer: COMMERCIAL

## 2022-06-24 VITALS
SYSTOLIC BLOOD PRESSURE: 134 MMHG | WEIGHT: 217.13 LBS | BODY MASS INDEX: 37.07 KG/M2 | DIASTOLIC BLOOD PRESSURE: 76 MMHG | HEIGHT: 64 IN | OXYGEN SATURATION: 97 % | TEMPERATURE: 97 F | HEART RATE: 108 BPM

## 2022-06-24 DIAGNOSIS — E11.9 DIABETES MELLITUS WITHOUT COMPLICATION: ICD-10-CM

## 2022-06-24 DIAGNOSIS — E78.5 DYSLIPIDEMIA ASSOCIATED WITH TYPE 2 DIABETES MELLITUS: ICD-10-CM

## 2022-06-24 DIAGNOSIS — E11.59 HYPERTENSION ASSOCIATED WITH DIABETES: Primary | ICD-10-CM

## 2022-06-24 DIAGNOSIS — E11.69 DYSLIPIDEMIA ASSOCIATED WITH TYPE 2 DIABETES MELLITUS: ICD-10-CM

## 2022-06-24 DIAGNOSIS — I15.2 HYPERTENSION ASSOCIATED WITH DIABETES: Primary | ICD-10-CM

## 2022-06-24 PROCEDURE — 99214 PR OFFICE/OUTPT VISIT, EST, LEVL IV, 30-39 MIN: ICD-10-PCS | Mod: S$GLB,,, | Performed by: INTERNAL MEDICINE

## 2022-06-24 PROCEDURE — 99999 PR PBB SHADOW E&M-EST. PATIENT-LVL IV: CPT | Mod: PBBFAC,,, | Performed by: INTERNAL MEDICINE

## 2022-06-24 PROCEDURE — 99999 PR PBB SHADOW E&M-EST. PATIENT-LVL IV: ICD-10-PCS | Mod: PBBFAC,,, | Performed by: INTERNAL MEDICINE

## 2022-06-24 PROCEDURE — 99214 OFFICE O/P EST MOD 30 MIN: CPT | Mod: S$GLB,,, | Performed by: INTERNAL MEDICINE

## 2022-06-24 NOTE — PROGRESS NOTES
History of present illness:  [age] year lady with hypertension, diabetes mellitus, dyslipidemia in today following up on a couple those issues.  She has had some elevated blood pressure readings recently.  Seen in our office and hydrochlorothiazide was added to her regimen.  She reports blood pressure readings since then at home have been good.  She denies any significant cardiac type chest pain, cough shortness of breath headache or other.      Current medications:  All medications are noted reviewed    Review of systems:  Constitutional:  No fever no chills no generalized body aches  Respiratory:  No cough shortness of breath.  Cardiovascular:  No chest pain or palpitations and no syncope.    Health screenings:  Colonoscopy 2018.  Mammogram March 2027.    Physical examination:  General:  Alert female no acute distress.  Vital signs:  Blood pressure 134/76 taken by this examiner.  Other vital signs normal.  HEENT:  Normocephalic.  Neck supple no masses no thyromegaly.  Lungs:  Clear to auscultation.  Cardiovascular:  Regular rate rhythm.  No significant murmur.  No JVD.  No peripheral extremity edema.      Impression:  Hypertension appears to be reasonably controlled.  Type 2 diabetes mellitus has been reasonably controlled due for update.  Dyslipidemia on pharmacologic therapy.        Plan:  Continue current pharmacologic regimen.  Update complete metabolic profile, lipid profile, glycohemoglobin, TSH.  Return clinic in six months.

## 2022-06-28 ENCOUNTER — LAB VISIT (OUTPATIENT)
Dept: LAB | Facility: HOSPITAL | Age: 62
End: 2022-06-28
Attending: INTERNAL MEDICINE
Payer: COMMERCIAL

## 2022-06-28 DIAGNOSIS — E11.9 DIABETES MELLITUS WITHOUT COMPLICATION: ICD-10-CM

## 2022-06-28 DIAGNOSIS — E78.5 DYSLIPIDEMIA ASSOCIATED WITH TYPE 2 DIABETES MELLITUS: ICD-10-CM

## 2022-06-28 DIAGNOSIS — E11.69 DYSLIPIDEMIA ASSOCIATED WITH TYPE 2 DIABETES MELLITUS: ICD-10-CM

## 2022-06-28 DIAGNOSIS — E11.59 HYPERTENSION ASSOCIATED WITH DIABETES: ICD-10-CM

## 2022-06-28 DIAGNOSIS — I15.2 HYPERTENSION ASSOCIATED WITH DIABETES: ICD-10-CM

## 2022-06-28 LAB
ALBUMIN SERPL BCP-MCNC: 3.7 G/DL (ref 3.5–5.2)
ALP SERPL-CCNC: 59 U/L (ref 55–135)
ALT SERPL W/O P-5'-P-CCNC: 30 U/L (ref 10–44)
ANION GAP SERPL CALC-SCNC: 11 MMOL/L (ref 8–16)
AST SERPL-CCNC: 21 U/L (ref 10–40)
BILIRUB SERPL-MCNC: 0.3 MG/DL (ref 0.1–1)
BUN SERPL-MCNC: 13 MG/DL (ref 8–23)
CALCIUM SERPL-MCNC: 9.8 MG/DL (ref 8.7–10.5)
CHLORIDE SERPL-SCNC: 101 MMOL/L (ref 95–110)
CHOLEST SERPL-MCNC: 169 MG/DL (ref 120–199)
CHOLEST/HDLC SERPL: 3.3 {RATIO} (ref 2–5)
CO2 SERPL-SCNC: 24 MMOL/L (ref 23–29)
CREAT SERPL-MCNC: 0.8 MG/DL (ref 0.5–1.4)
EST. GFR  (AFRICAN AMERICAN): >60 ML/MIN/1.73 M^2
EST. GFR  (NON AFRICAN AMERICAN): >60 ML/MIN/1.73 M^2
ESTIMATED AVG GLUCOSE: 186 MG/DL (ref 68–131)
GLUCOSE SERPL-MCNC: 202 MG/DL (ref 70–110)
HBA1C MFR BLD: 8.1 % (ref 4–5.6)
HDLC SERPL-MCNC: 52 MG/DL (ref 40–75)
HDLC SERPL: 30.8 % (ref 20–50)
LDLC SERPL CALC-MCNC: 90.2 MG/DL (ref 63–159)
NONHDLC SERPL-MCNC: 117 MG/DL
POTASSIUM SERPL-SCNC: 4.4 MMOL/L (ref 3.5–5.1)
PROT SERPL-MCNC: 6.8 G/DL (ref 6–8.4)
SODIUM SERPL-SCNC: 136 MMOL/L (ref 136–145)
TRIGL SERPL-MCNC: 134 MG/DL (ref 30–150)
TSH SERPL DL<=0.005 MIU/L-ACNC: 2.87 UIU/ML (ref 0.4–4)

## 2022-06-28 PROCEDURE — 80053 COMPREHEN METABOLIC PANEL: CPT | Performed by: INTERNAL MEDICINE

## 2022-06-28 PROCEDURE — 36415 COLL VENOUS BLD VENIPUNCTURE: CPT | Mod: PO | Performed by: INTERNAL MEDICINE

## 2022-06-28 PROCEDURE — 84443 ASSAY THYROID STIM HORMONE: CPT | Performed by: INTERNAL MEDICINE

## 2022-06-28 PROCEDURE — 83036 HEMOGLOBIN GLYCOSYLATED A1C: CPT | Performed by: INTERNAL MEDICINE

## 2022-06-28 PROCEDURE — 80061 LIPID PANEL: CPT | Performed by: INTERNAL MEDICINE

## 2022-07-18 RX ORDER — ALPRAZOLAM 0.5 MG/1
0.5 TABLET ORAL 4 TIMES DAILY PRN
Qty: 100 TABLET | Refills: 0 | OUTPATIENT
Start: 2022-07-18

## 2022-07-19 RX ORDER — ALPRAZOLAM 0.5 MG/1
0.5 TABLET ORAL 4 TIMES DAILY PRN
Qty: 100 TABLET | Refills: 0 | OUTPATIENT
Start: 2022-07-19

## 2022-07-19 RX ORDER — ALPRAZOLAM 0.5 MG/1
TABLET ORAL
Qty: 100 TABLET | Refills: 0 | Status: SHIPPED | OUTPATIENT
Start: 2022-07-19 | End: 2022-08-11

## 2022-07-19 NOTE — TELEPHONE ENCOUNTER
----- Message from Stacy Dewitt sent at 7/19/2022  9:33 AM CDT -----  Contact: Self [phone]  Requesting an RX refill or new RX.    Is this a refill or new RX: refill    RX name and strength (copy/paste from chart):  ALPRAZolam (XANAX) 0.5 MG tablet    Is this a 30 day or 90 day RX: 30    Pharmacy name and phone # (copy/paste from chart):      [facility] #[id number] - [place] [address] & [address] [address]  Phone: [phone] Fax: [phone]    Pt is requesting a response via [employer].

## 2022-07-19 NOTE — TELEPHONE ENCOUNTER
----- Message from Tg Foster MA sent at 7/19/2022  3:11 PM CDT -----  Contact: Patient @ [phone]  The patient is calling to speak with staff. The patient would like to know why her prescription for Xanax is being denied. Please give the patient a call back at [phone].

## 2022-08-06 ENCOUNTER — PATIENT MESSAGE (OUTPATIENT)
Dept: ENDOSCOPY | Facility: HOSPITAL | Age: 62
End: 2022-08-06
Payer: COMMERCIAL

## 2022-08-06 ENCOUNTER — TELEPHONE (OUTPATIENT)
Dept: ENDOSCOPY | Facility: HOSPITAL | Age: 62
End: 2022-08-06
Payer: COMMERCIAL

## 2022-08-11 RX ORDER — ALPRAZOLAM 0.5 MG/1
TABLET ORAL
Qty: 100 TABLET | Refills: 0 | Status: SHIPPED | OUTPATIENT
Start: 2022-08-11 | End: 2022-09-06 | Stop reason: SDUPTHER

## 2022-08-12 RX ORDER — ALPRAZOLAM 0.5 MG/1
0.5 TABLET ORAL 4 TIMES DAILY PRN
Qty: 100 TABLET | Refills: 0 | OUTPATIENT
Start: 2022-08-12

## 2022-08-12 NOTE — TELEPHONE ENCOUNTER
----- Message from Sonal Arnold sent at 8/12/2022 10:20 AM CDT -----  Contact: [phone]  Requesting an RX refill or new RX.  Is this a refill or new RX: refill  RX name and strength (copy/paste from chart):  ALPRAZolam (XANAX) 0.5 MG tablet  Is this a 30 day or 90 day RX: 30  Pharmacy name and phone # (copy/paste from chart):   [id number] - [place] [address] & [facility] [address] [place] [address] [facility] [address] [place] [id number]  Phone: [phone] Fax: [phone]          The doctors have asked that we provide their patients with the following 2 reminders -- prescription refills can take up to 72 hours, and a friendly reminder that in the future you can use your [facility] account to request refills: yes     The pharmacy is telling her they did not get approval for this please give return call

## 2022-09-06 RX ORDER — ALPRAZOLAM 0.5 MG/1
0.5 TABLET ORAL 4 TIMES DAILY PRN
Qty: 100 TABLET | Refills: 0 | Status: SHIPPED | OUTPATIENT
Start: 2022-09-06 | End: 2022-10-03

## 2022-09-06 RX ORDER — PRAVASTATIN SODIUM 40 MG/1
TABLET ORAL
Qty: 90 TABLET | Refills: 3 | Status: SHIPPED | OUTPATIENT
Start: 2022-09-06 | End: 2023-11-10

## 2022-09-07 DIAGNOSIS — E11.9 TYPE 2 DIABETES MELLITUS WITHOUT COMPLICATION: ICD-10-CM

## 2022-09-28 ENCOUNTER — OFFICE VISIT (OUTPATIENT)
Dept: GASTROENTEROLOGY | Facility: CLINIC | Age: 62
End: 2022-09-28
Payer: COMMERCIAL

## 2022-09-28 VITALS
HEIGHT: 64 IN | BODY MASS INDEX: 36.02 KG/M2 | WEIGHT: 211 LBS | HEART RATE: 87 BPM | DIASTOLIC BLOOD PRESSURE: 85 MMHG | SYSTOLIC BLOOD PRESSURE: 149 MMHG

## 2022-09-28 DIAGNOSIS — Z86.010 HISTORY OF COLON POLYPS: ICD-10-CM

## 2022-09-28 DIAGNOSIS — K21.9 LARYNGOPHARYNGEAL REFLUX (LPR): ICD-10-CM

## 2022-09-28 DIAGNOSIS — K21.00 GASTROESOPHAGEAL REFLUX DISEASE WITH ESOPHAGITIS WITHOUT HEMORRHAGE: Primary | ICD-10-CM

## 2022-09-28 DIAGNOSIS — R13.19 INTERMITTENT DYSPHAGIA: ICD-10-CM

## 2022-09-28 DIAGNOSIS — K11.7 XEROSTOMIA: ICD-10-CM

## 2022-09-28 PROCEDURE — 99999 PR PBB SHADOW E&M-EST. PATIENT-LVL IV: CPT | Mod: PBBFAC,,, | Performed by: INTERNAL MEDICINE

## 2022-09-28 PROCEDURE — 99999 PR PBB SHADOW E&M-EST. PATIENT-LVL IV: ICD-10-PCS | Mod: PBBFAC,,, | Performed by: INTERNAL MEDICINE

## 2022-09-28 PROCEDURE — 99213 OFFICE O/P EST LOW 20 MIN: CPT | Mod: S$GLB,,, | Performed by: INTERNAL MEDICINE

## 2022-09-28 PROCEDURE — 99213 PR OFFICE/OUTPT VISIT, EST, LEVL III, 20-29 MIN: ICD-10-PCS | Mod: S$GLB,,, | Performed by: INTERNAL MEDICINE

## 2022-09-28 NOTE — PROGRESS NOTES
[facility] Established Patient Visit    Reason for Visit:    Chief Complaint   Patient presents with    Follow-up       PCP: LUKAS Bentley      HPI:  Carmen Hernandez is a 62 y.o. female here for follow-up of GERD and LPR.  I last saw her in August of 2021.  At that time, she had stable symptoms on omeprazole 40 mg daily.  She is here for a 1 year follow-up.  She is still on 40 mg every morning.  She is noticed an intermittent sticking sensation in the morning when trying to eat breakfast.  She thinks this could be related to dry mouth.  She has a dry mouth every morning that she wakes.  The symptoms go away throughout the day.  She reports no worsening of her cough.  She still has a mild cough after drinking water.  She reports no other new symptoms.  She denies abdominal pain.    Her last EGD was 09/26/2017 with Dr. Santos.  Report reviewed.  No concerning findings except for grade a esophagitis.    Last colonoscopy 10/31/2018, also with Dr. Santos for change in bowel patterns.  The exam was complete with intubation of terminal ileum.  Bowel preparation was excellent.  A 2 mm tubular adenoma was removed the transverse colon.  Diverticulosis is noted in the sigmoid colon.  Nonbleeding internal hemorrhoids were seen.  Random colon biopsies were unremarkable.  The terminal ileum was normal.  Repeat was recommended in 5 years.          ROS:  Constitutional: No fevers, chills, No weight loss, normal appetite  GI: see HPI          PMHX:  has a past medical history of Anxiety, Bilateral dry eyes, Cataract, Diabetes mellitus, Diverticulosis, GERD (gastroesophageal reflux disease), Hyperlipidemia, Hypertension, Irritable bowel syndrome, Nephrolithiasis, and Severe obesity (BMI 35.0-39.9) with comorbidity.    PSHX:  has a past surgical history that includes Endometrial biopsy (X2); Cystoscopy w/ stone manipulation; Oophorectomy (2013); Hysterectomy (2013); Colonoscopy w/ polypectomy (5/13/2009   [facility]); Colonoscopy (05/16/2013); Upper gastrointestinal endoscopy (09/26/2017); and Colonoscopy (N/A, 10/31/2018).    The patient's social and family histories were reviewed by me and updated in the appropriate section of the electronic medical record.    Review of patient's allergies indicates:   Allergen Reactions    Demerol [meperidine] Hives    Iodinated contrast media Hives    Bactrim [sulfamethoxazole-trimethoprim] Other (See Comments)     Other reaction(s): blotchy skin    Ciprofloxacin Other (See Comments)     Other reaction(s): blotchy skin    Erythromycin Other (See Comments)     tachycardia       Prior to Admission medications    Medication Sig Start Date End Date Taking? Authorizing Provider   alcohol swabs (BD ALCOHOL SWABS) PadM USE TO CHECK BLOOD GLUCOSE TWICE DAILY 9/2/16   SHAGUFTA Bentley MD   ALPRAZolam (XANAX) 0.5 MG tablet Take 1 tablet (0.5 mg total) by mouth 4 (four) times daily as needed. 9/6/22   SHAGUFTA Bentley MD   blood sugar diagnostic (ONETOUCH VERIO TEST STRIPS) Strp USE TO TEST BLOOD SUGAR 3 TO 4 TIMES DAILY 2/15/22   SHAGUFTA Bentley MD   doxycycline (MONODOX) 50 MG Cap TAKE 1 CAPSULE BY MOUTH EVERY DAY 9/19/22   DESIREE Jackson, ALLEN   fluocinonide 0.05% (LIDEX) 0.05 % cream Apply topically 2 (two) times daily. 11/4/20   Sandra Shine MD   hydroCHLOROthiazide (HYDRODIURIL) 12.5 MG Tab TAKE 1 TABLET(12.5 MG) BY MOUTH EVERY DAY 6/24/22   Saundra Camacho NP   lisinopriL (PRINIVIL,ZESTRIL) 40 MG tablet Take 1 tablet (40 mg total) by mouth once daily. 5/27/22   Saundra Camacho NP   metFORMIN (GLUCOPHAGE) 500 MG tablet TAKE 2 TABLETS BY MOUTH TWICE DAILY 6/6/22   SHAGUFTA Bentley MD   omeprazole (PRILOSEC) 40 MG capsule TAKE 1 CAPSULE(40 MG) BY MOUTH EVERY DAY 8/13/22   Jean Girard MD   [employer] SYNC kit USE TO TEST BLOOD GLUOCSE TWICE DAILY 9/2/16   SHAGUFTA Bentley MD   pravastatin (PRAVACHOL) 40 MG tablet TAKE 1 TABLET BY MOUTH EVERY DAY 9/6/22   P. Eleuterio  "MD Mc   SITagliptin (JANUVIA) 25 MG Tab Take 1 tablet (25 mg total) by mouth once daily. 5/27/22 5/27/23  Saundra Camacho NP         Objective Findings:  Vital Signs:  BP (!) 149/85   Pulse 87   Ht 5' 4" (1.626 m)   Wt 95.7 kg (211 lb)   BMI 36.22 kg/m²  Body mass index is 36.22 kg/m².      Physical Exam:  General Appearance:  Well appearing in no acute distress, appears stated age  Head:  Normocephalic, atraumatic  Eyes:  No scleral icterus or pallor, EOMI          Labs:  Lab Results   Component Value Date    WBC 10.78 05/23/2022    HGB 14.7 05/23/2022    HCT 44.6 05/23/2022    MCV 96 05/23/2022    RDW 12.3 05/23/2022     05/23/2022    GRAN 7.6 05/23/2022    GRAN 70.5 05/23/2022    LYMPH 2.3 05/23/2022    LYMPH 21.2 05/23/2022    MONO 0.7 05/23/2022    MONO 6.3 05/23/2022    EOS 0.1 05/23/2022    BASO 0.04 05/23/2022     Lab Results   Component Value Date     06/28/2022    K 4.4 06/28/2022     06/28/2022    CO2 24 06/28/2022     (H) 06/28/2022    BUN 13 06/28/2022    CREATININE 0.8 06/28/2022    CALCIUM 9.8 06/28/2022    PROT 6.8 06/28/2022    ALBUMIN 3.7 06/28/2022    BILITOT 0.3 06/28/2022    ALKPHOS 59 06/28/2022    AST 21 06/28/2022    ALT 30 06/28/2022                     Assessment:  Carmen Hernandez is a 62 y.o. female here with:  1. Gastroesophageal reflux disease with esophagitis without hemorrhage    2. Laryngopharyngeal reflux (LPR)    3. Intermittent dysphagia    4. Xerostomia    5. History of colon polyps      GERD and LPR which are longstanding problems for her.  General resolution of symptoms with 40 mg of omeprazole taken every morning.  She has reported some intermittent dysphagia like symptoms with eating breakfast that resolves throughout the day.  She reports a dry mouth, which she thinks may be contributing to this.  She has intermittent coughing with drinking water.  Last EGD was in September 2017 with grade a esophagitis noted.  The symptoms could be " indicative of active inflammation or a stricture in the esophagus.  Esophageal diverticulum is another consideration, but none were seen during last EGD.  Hiatal hernia is a consideration.    Personal history of colon polyps with a small adenoma removed in October 2018.  She will be due for colonoscopy in October 2023.    The patient lives in [place], [place].  Finds it difficult to come to [place] for appointments, and inquires about follow-up with a physician on the [place].  She has seen Dr. Santos in the past.      Recommendations:  1. Continue omeprazole 40 mg q.a.m.  2. I will arrange for an EGD to assess symptoms further.  Who prefers to have this done on the [place].  Order placed and given number for the [facility] scheduling team.  She may discuss follow-up with the performing physician at that time.  3. Colonoscopy in October 2023 for surveillance purposes.      Follow-up with GI in [place].      Order summary:  Orders Placed This Encounter    Ambulatory referral/consult to Endo Procedure     Case Request Endoscopy: EGD (ESOPHAGOGASTRODUODENOSCOPY)           Jean Girard MD

## 2022-09-30 ENCOUNTER — TELEPHONE (OUTPATIENT)
Dept: GASTROENTEROLOGY | Facility: CLINIC | Age: 62
End: 2022-09-30
Payer: COMMERCIAL

## 2022-09-30 NOTE — TELEPHONE ENCOUNTER
----- Message from Joslyn Roberts sent at 9/30/2022  4:27 PM CDT -----  Contact: Pt  Type:  EGD Appointment Request    Name of Caller:  Pt    Best Call Back Number:  [phone]    Additional Information:  Please call back. Thanks.

## 2022-10-03 DIAGNOSIS — I10 HTN (HYPERTENSION), BENIGN: ICD-10-CM

## 2022-10-03 RX ORDER — ALPRAZOLAM 0.5 MG/1
TABLET ORAL
Qty: 100 TABLET | Refills: 0 | Status: SHIPPED | OUTPATIENT
Start: 2022-10-03 | End: 2022-10-27

## 2022-10-03 RX ORDER — LISINOPRIL 40 MG/1
TABLET ORAL
Qty: 90 TABLET | Refills: 3 | Status: SHIPPED | OUTPATIENT
Start: 2022-10-03 | End: 2023-11-27

## 2022-10-10 ENCOUNTER — PATIENT MESSAGE (OUTPATIENT)
Dept: ADMINISTRATIVE | Facility: HOSPITAL | Age: 62
End: 2022-10-10
Payer: COMMERCIAL

## 2022-10-12 ENCOUNTER — OFFICE VISIT (OUTPATIENT)
Dept: OPTOMETRY | Facility: CLINIC | Age: 62
End: 2022-10-12
Payer: COMMERCIAL

## 2022-10-12 DIAGNOSIS — H04.123 DRY EYES, BILATERAL: ICD-10-CM

## 2022-10-12 DIAGNOSIS — E11.9 DIABETES MELLITUS TYPE 2 WITHOUT RETINOPATHY: Primary | ICD-10-CM

## 2022-10-12 DIAGNOSIS — E11.36 CATARACT ASSOCIATED WITH TYPE 2 DIABETES MELLITUS: ICD-10-CM

## 2022-10-12 DIAGNOSIS — H43.813 POSTERIOR VITREOUS DETACHMENT, BILATERAL: ICD-10-CM

## 2022-10-12 DIAGNOSIS — H52.4 MYOPIA WITH ASTIGMATISM AND PRESBYOPIA, BILATERAL: ICD-10-CM

## 2022-10-12 DIAGNOSIS — H52.203 MYOPIA WITH ASTIGMATISM AND PRESBYOPIA, BILATERAL: ICD-10-CM

## 2022-10-12 DIAGNOSIS — Z13.5 GLAUCOMA SCREENING: ICD-10-CM

## 2022-10-12 DIAGNOSIS — H01.00B BLEPHARITIS OF UPPER AND LOWER EYELIDS OF BOTH EYES, UNSPECIFIED TYPE: ICD-10-CM

## 2022-10-12 DIAGNOSIS — H52.13 MYOPIA WITH ASTIGMATISM AND PRESBYOPIA, BILATERAL: ICD-10-CM

## 2022-10-12 DIAGNOSIS — H01.00A BLEPHARITIS OF UPPER AND LOWER EYELIDS OF BOTH EYES, UNSPECIFIED TYPE: ICD-10-CM

## 2022-10-12 PROCEDURE — 99999 PR PBB SHADOW E&M-EST. PATIENT-LVL III: CPT | Mod: PBBFAC,,, | Performed by: OPTOMETRIST

## 2022-10-12 PROCEDURE — 92015 DETERMINE REFRACTIVE STATE: CPT | Mod: S$GLB,,, | Performed by: OPTOMETRIST

## 2022-10-12 PROCEDURE — 92014 PR EYE EXAM, EST PATIENT,COMPREHESV: ICD-10-PCS | Mod: S$GLB,,, | Performed by: OPTOMETRIST

## 2022-10-12 PROCEDURE — 92014 COMPRE OPH EXAM EST PT 1/>: CPT | Mod: S$GLB,,, | Performed by: OPTOMETRIST

## 2022-10-12 PROCEDURE — 99999 PR PBB SHADOW E&M-EST. PATIENT-LVL III: ICD-10-PCS | Mod: PBBFAC,,, | Performed by: OPTOMETRIST

## 2022-10-12 PROCEDURE — 92015 PR REFRACTION: ICD-10-PCS | Mod: S$GLB,,, | Performed by: OPTOMETRIST

## 2022-10-12 NOTE — PROGRESS NOTES
HPI    Dle- 05/26/2021    Pt here for routine diabetic eye exam. Pt sts changes to near va. Deneis   flashes/floaters/eye pain. Gtts: otc systane prn. DM controlled with meds.   HPN controlled with meds.     Hemoglobin A1C       Date                     Value               Ref Range             Status                06/28/2022               8.1 (H)             4.0 - 5.6 %           Final                       06/25/2021               7.6 (H)             4.0 - 5.6 %           Final                       10/14/2020               7.5 (H)             4.0 - 5.6 %           Final                Last edited by Ita Mena MA on 10/12/2022  4:43 PM.        ROS    Positive for: Eyes  Negative for: Constitutional, Gastrointestinal, Neurological, Skin,   Genitourinary, Musculoskeletal, HENT, Endocrine, Cardiovascular,   Respiratory, Psychiatric, Allergic/Imm, Heme/Lymph  Last edited by DESIREE Jackson, ALLEN on 10/12/2022  5:15 PM.        Assessment /Plan     For exam results, see Encounter Report.    Diabetes mellitus type 2 without retinopathy    Posterior vitreous detachment, bilateral    Cataract associated with type 2 diabetes mellitus    Glaucoma screening    Dry eyes, bilateral    Blepharitis of upper and lower eyelids of both eyes, unspecified type    Myopia with astigmatism and presbyopia, bilateral      1. No cornelius/ retinopathy, no csme, gave Diabetic Retinopathy info, control glucose and BP  Advise annual DFE  2. RD precautions given and reviewed. Patient knows to call/ message if any further changes in symptoms occur.  3. Early NS changes, vis sig for glare and night activities  Cautions driving--reassured no sx for 3-4 yrs probable     4. Low suspect w/ some c/d asymmetry   Angles open, IOP mid teens, monitor all  5. Longstanding, continue good hydration  ATs as previous, consider gel gtts or even bland brenna qhs  6. Longstanding ---lid health moderate to good today    Continue lid hygiene, sheet given  Scrubs qhs or  qod  ATs as well  Tried tea tree oil and had irritation / discomfort   Continue doxy 50 mg po qd     Small old chalazion remains in RUL---not big enough to warrant excision--monitor    7. Updated specs rx, gave copy, fill prn  Ok continue with current     Discussed and educated patient on current findings /plan.  RTC 1 year, prn if any changes / issues

## 2022-10-12 NOTE — PATIENT INSTRUCTIONS
"DRY EYES -- BURNING OR NORMAN SYMPTOMS:  Use Over The Counter artificial tears as needed for dry eye symptoms.   Some common brands include:  Systane, Optive, Refresh, and Thera-Tears.  These drops can be used as frequently as desired, but may be most helpful use during long periods of concentrated work.  For example, reading / working at the computer. Start with 3-4x per day.     Nighttime Ophthalmic gel or ointments are available: Refresh PM, Genteal, and Lacrilube.    Avoid drops that "get redness out" (Visine, Murine, Clear Eyes), as these may contain medication that could further irritate the eyes, especially with chronic use.    ALLERGY EYES -- ITCHING SYMPTOMS:  Over the counter medications include--Pataday, Zaditor, and Alaway.  Use as directed 1-2 drops daily for symptoms of itching / watering eyes.  These drops will not help for dry eye or exposure symptoms.    REDNESS RELIEF:  Lumify---is a good redness reliever that will not cause irritation if used chronically.        DIABETES AND THE EYE / DIABETIC RETINOPATHY    Diabetic retinopathy is a condition occurring in persons with diabetes, which causes progressive damage to the retina, the light sensitive lining at the back of the eye. It is a serious sight-threatening complication of diabetes.    Diabetic retinopathy is the result of damage to the tiny blood vessels that nourish the retina. They leak blood and other fluids that cause swelling of retinal tissue and clouding of vision. The condition usually affects both eyes. The longer a person has diabetes, the more likely they will develop diabetic retinopathy. If left untreated, diabetic retinopathy can cause blindness.  There are two basic types of diabetic retinopathy:    Background or nonproliferative diabetic retinopathy (NPDR)  Nonproliferative diabetic retinopathy (NPDR) is the earliest stage of diabetic retinopathy. With this condition, damaged blood vessels in the retina begin to leak extra fluid " and small amounts of blood into the eye. Sometimes, deposits of cholesterol or other fats from the blood may leak into the retina. Many people with diabetes have mild NPDR, which usually does not affect their vision. However, if their vision is affected, it is the result of macular edema and macular ischemia.    If vision is affected due to macular changes, a consult with a Retina Specialist may be advised.  This is an ophthalmologist that treats retina conditions, including diabetic retinopathy.     Proliferative diabetic retinopathy (PDR)  Proliferative diabetic retinopathy (PDR) mainly occurs when many of the blood vessels in the retina close, preventing enough blood flow. In an attempt to supply blood to the area where the original vessels closed, the retina responds by growing new blood vessels. This is called neovascularization. However, these new blood vessels are abnormal and do not supply the retina with proper blood flow. The new vessels are also often accompanied by scar tissue that may cause the retina to wrinkle or detach. PDR may cause more severe vision loss than NPDR because it can affect both central and peripheral vision.     A patient diagnosed with proliferative diabetic eye disease will be referred to a retinal specialist for consultation.    Often there are no visual symptoms in the early stages of diabetic retinopathy. That is why our eye care professionals recommend that everyone with diabetes have a comprehensive dilated eye examination once a year. Early detection and treatment can limit the potential for significant vision loss from diabetic retinopathy.       FLASHES / FLOATERS / POSTERIOR VITREOUS DETACHMENT    Call the clinic if you have any further changes in symptoms.  Including:  Increased numbers of floaters or flashing lights, dimness or darkness that moves through or stays constant in your vision, or any pain in the eye (s).    You may sometimes see small specks or clouds moving  in your field of vision.  They are called FLOATERS.  You can often see them when looking at a plain background, like a blank wall or blue cassandra.  Floaters are actually tiny clumps of gel or cells inside the VITREOUS, the clear jelly-like fluid that fills the inside of your eye.    While these objects look like they are in front of your eye, they are actually floating inside.  What you see are the shadows they cast on the RETINA, the nerve layer at the back of the eye that senses light and allows you to see.      POSTERIOR VITREOUS DETACHMENT    The appearance of new floaters may be alarming.  If you suddenly develop new floaters, you should contact your eye care professional  right away.    The retina can tear if the shrinking vitreous pulls away from the wall of the eye.  This sometimes causes a small amount of bleeding in the eye that may appear as new floaters.    A torn retina is always a serious problem, since it can lead to a retinal detachment.  You should see your eye care professional as soon as possible if:    even one new floater appears suddenly;  you see sudden flashes of light;  you notice other symptoms, like the loss of side vision, or a curtain closes down in your vision        POSTERIOR VITREOUS DETACHMENT is more common for people who:    are nearsighted;  have had cataract surgery;  have had YAG laser surgery of the eye;  have had inflammation inside the eye;  are over age 60.      While some floaters may remain visible, many of them will fade over time and become less noticeable.  Even if you've had some floaters for years, you should have your eyes checked as soon as possible if you notice new ones.    FLASHING LIGHTS    When the vitreous gel rubs or pulls on the retina, you may see what look like flashing lights or lightning streaks.  These flashes can appear off and on for several weeks or months.      Some people experience flashes of light that appear as jagged lines or heat waves in both  eyes, lasting 10-20 minutes.  These flashes are caused by a spasm of blood vessels in the brain, which is called a migraine.    If a headache follows these flashes, it's called a migraine headache.  If   no headache occurs, these flashes are called Ophthalmic or Ocular Migraine.           Early Cataracts--not visually significant for surgery consultation.    What Are Cataracts?  A clear lens in the eye focuses light. This lets the eye see images sharply. With age, the lens slowly becomes cloudy. The cloudy lens is a cataract. A cataract scatters light and makes it hard for the eye to focus. Cataracts often form in both eyes. But one lens may cloud faster than the other.      The Aging of Your Lens    Your lens may cloud so slowly that you don`t notice any vision changes at first. But as the cataract gets worse, the eye has a harder time focusing. In early stages, glasses may help you see better. As the lens gets cloudier, your doctor may recommend surgery to restore your vision.

## 2022-10-17 ENCOUNTER — TELEPHONE (OUTPATIENT)
Dept: GASTROENTEROLOGY | Facility: CLINIC | Age: 62
End: 2022-10-17
Payer: COMMERCIAL

## 2022-10-17 ENCOUNTER — TELEPHONE (OUTPATIENT)
Dept: INTERNAL MEDICINE | Facility: CLINIC | Age: 62
End: 2022-10-17
Payer: COMMERCIAL

## 2022-10-17 NOTE — TELEPHONE ENCOUNTER
----- Message from Joslyn Roberts sent at 10/17/2022  2:49 PM CDT -----  Contact: Pt  Type: EGD Request    Name of Caller:  Pt    Best Call Back Number:  [phone]    Additional Information:   Referred by Dr. Girard at [facility].  Please call back.  Thanks.

## 2022-10-17 NOTE — TELEPHONE ENCOUNTER
"----- Message from Shumela Hook sent at 10/17/2022  3:07 PM CDT -----  Contact: [phone]  type: Lab    Caller is requesting to schedule their Lab appointment prior to annual appointment.  Order is not listed in EPIC.  Please enter order and contact patient to schedule.    Name of Caller:Carmen Davila Date and Time of Labs: Tuesday around 10-11am      Date of Annual Physical Appointment:12/30/22    Where would they like the lab performed?[facility]    Would the patient rather a call back or a response via [facility]? Call back     Best Call Back Number:[phone]    Additional Information:n/a     "Do not send messages requesting lab orders prior to Physical appt on these providers: Grace Gutierres, Diamante Dang,  Tania Sandoval and Shantanu."        "

## 2022-10-18 ENCOUNTER — PATIENT OUTREACH (OUTPATIENT)
Dept: ADMINISTRATIVE | Facility: HOSPITAL | Age: 62
End: 2022-10-18
Payer: COMMERCIAL

## 2022-10-18 DIAGNOSIS — E11.9 TYPE 2 DIABETES MELLITUS WITHOUT COMPLICATION, UNSPECIFIED WHETHER LONG TERM INSULIN USE: Primary | ICD-10-CM

## 2022-10-18 NOTE — PROGRESS NOTES
Chart review done.  updated. Immunizations reviewed & updated. Care Everywhere updated.   Lab appt scheduled.

## 2022-10-24 ENCOUNTER — LAB VISIT (OUTPATIENT)
Dept: LAB | Facility: HOSPITAL | Age: 62
End: 2022-10-24
Attending: INTERNAL MEDICINE
Payer: COMMERCIAL

## 2022-10-24 DIAGNOSIS — E11.9 TYPE 2 DIABETES MELLITUS WITHOUT COMPLICATION, UNSPECIFIED WHETHER LONG TERM INSULIN USE: ICD-10-CM

## 2022-10-24 LAB
ESTIMATED AVG GLUCOSE: 171 MG/DL (ref 68–131)
HBA1C MFR BLD: 7.6 % (ref 4–5.6)

## 2022-10-24 PROCEDURE — 36415 COLL VENOUS BLD VENIPUNCTURE: CPT | Mod: PO | Performed by: INTERNAL MEDICINE

## 2022-10-24 PROCEDURE — 83036 HEMOGLOBIN GLYCOSYLATED A1C: CPT | Performed by: INTERNAL MEDICINE

## 2022-11-21 ENCOUNTER — OFFICE VISIT (OUTPATIENT)
Dept: URGENT CARE | Facility: CLINIC | Age: 62
End: 2022-11-21
Payer: COMMERCIAL

## 2022-11-21 VITALS
BODY MASS INDEX: 37.05 KG/M2 | SYSTOLIC BLOOD PRESSURE: 142 MMHG | WEIGHT: 217 LBS | OXYGEN SATURATION: 98 % | HEART RATE: 75 BPM | TEMPERATURE: 99 F | RESPIRATION RATE: 16 BRPM | DIASTOLIC BLOOD PRESSURE: 79 MMHG | HEIGHT: 64 IN

## 2022-11-21 DIAGNOSIS — R52 PAIN: ICD-10-CM

## 2022-11-21 DIAGNOSIS — M65.4 TENOSYNOVITIS, DE QUERVAIN: Primary | ICD-10-CM

## 2022-11-21 PROCEDURE — 99213 PR OFFICE/OUTPT VISIT, EST, LEVL III, 20-29 MIN: ICD-10-PCS | Mod: S$GLB,,, | Performed by: NURSE PRACTITIONER

## 2022-11-21 PROCEDURE — 99213 OFFICE O/P EST LOW 20 MIN: CPT | Mod: S$GLB,,, | Performed by: NURSE PRACTITIONER

## 2022-11-21 PROCEDURE — 73110 X-RAY EXAM OF WRIST: CPT | Mod: LT,S$GLB,, | Performed by: RADIOLOGY

## 2022-11-21 PROCEDURE — 73110 XR WRIST COMPLETE 3 VIEWS LEFT: ICD-10-PCS | Mod: LT,S$GLB,, | Performed by: RADIOLOGY

## 2022-11-21 RX ORDER — NAPROXEN 500 MG/1
500 TABLET ORAL 2 TIMES DAILY PRN
Qty: 30 TABLET | Refills: 0 | Status: SHIPPED | OUTPATIENT
Start: 2022-11-21 | End: 2023-04-25

## 2022-11-21 NOTE — PROGRESS NOTES
"Subjective:       Patient ID: Carmen Hernandez is a 62 y.o. female.    Vitals:  height is 5' 4" (1.626 m) and weight is 98.4 kg (217 lb). Her oral temperature is 98.5 °F (36.9 °C). Her blood pressure is 142/79 (abnormal) and her pulse is 75. Her respiration is 16 and oxygen saturation is 98%.     Chief Complaint: Wrist Pain    Pt presents to urgent care with L arm forearm and wrist pain. Symptoms include pain with movement, touching, and grabbing. Symptoms started 1 month and a half ago. Pt is taking tylenol and ice with no relief.     Provider note begins below:  Patient denies fever or chills.  Patient denies any wrist injury.  Denies any numbness or tingling to her left wrist or hand.    Arm Pain   The incident occurred more than 1 week ago. Incident location: unsure. The pain is present in the left wrist and left forearm. The quality of the pain is described as aching. The pain radiates to the left arm. The pain is at a severity of 7/10. The patient is experiencing no pain. The pain has been Constant since the incident. Pertinent negatives include no chest pain, muscle weakness, numbness or tingling. The symptoms are aggravated by movement and lifting. She has tried ice and acetaminophen for the symptoms. The treatment provided no relief.     Constitution: Negative. Negative for chills, sweating and fatigue.   HENT: Negative.  Negative for ear pain, facial swelling, congestion and sore throat.    Neck: Negative for painful lymph nodes.   Cardiovascular: Negative.  Negative for chest trauma, chest pain and sob on exertion.   Eyes: Negative.  Negative for eye itching and eye pain.   Respiratory: Negative.  Negative for chest tightness, cough and asthma.    Gastrointestinal: Negative.  Negative for nausea, vomiting and diarrhea.   Endocrine: negative. cold intolerance and excessive thirst.   Genitourinary: Negative.  Negative for dysuria, frequency, urgency and hematuria.   Musculoskeletal:  Positive for " pain and joint pain. Negative for trauma.   Skin: Negative.  Negative for rash, wound and hives.   Allergic/Immunologic: Negative.  Negative for eczema, asthma, hives and itching.   Neurological: Negative.  Negative for disorientation, altered mental status and numbness.   Hematologic/Lymphatic: Negative.  Negative for swollen lymph nodes.   Psychiatric/Behavioral: Negative.  Negative for altered mental status, disorientation and confusion.      Objective:      Physical Exam   Constitutional: She is oriented to person, place, and time. She appears well-developed. She is cooperative.  Non-toxic appearance. She does not appear ill. No distress.   HENT:   Head: Normocephalic and atraumatic.   Ears:   Right Ear: Hearing, tympanic membrane, external ear and ear canal normal.   Left Ear: Hearing, tympanic membrane, external ear and ear canal normal.   Nose: Nose normal. No mucosal edema, rhinorrhea or nasal deformity. No epistaxis. Right sinus exhibits no maxillary sinus tenderness and no frontal sinus tenderness. Left sinus exhibits no maxillary sinus tenderness and no frontal sinus tenderness.   Mouth/Throat: Uvula is midline, oropharynx is clear and moist and mucous membranes are normal. No trismus in the jaw. Normal dentition. No uvula swelling. No posterior oropharyngeal erythema.   Eyes: Conjunctivae and lids are normal. Right eye exhibits no discharge. Left eye exhibits no discharge. No scleral icterus.   Neck: Trachea normal and phonation normal. Neck supple.   Cardiovascular: Normal rate, regular rhythm, normal heart sounds and normal pulses.   Pulmonary/Chest: Effort normal and breath sounds normal. No respiratory distress.   Abdominal: Normal appearance and bowel sounds are normal. She exhibits no distension and no mass. Soft. There is no abdominal tenderness.   Musculoskeletal: Normal range of motion.         General: Tenderness present. No swelling, deformity or signs of injury. Normal range of motion.       Left hand: She exhibits normal capillary refill. Decreased sensation is not present in the ulnar distribution, is not present in the medial redistribution and is not present in the radial distribution. Motor /Testing: The patient has normal left wrist strength. Wrist Extension: 5/5. Wrist Flexion: 5/5. : 5/5. Index Finger: 5/5. Middle Finger: 5/5. Ring Finger: 5/5. Little Finger: 5/5. Thumb APB: 5/5. Thumb FPL: 5/5. Pinch Mechanism: 5/5. Testing: Finkelstein's test: positive.        Hands:    Neurological: no focal deficit. She is alert and oriented to person, place, and time. She exhibits normal muscle tone. Coordination normal.   Skin: Skin is warm, dry, intact, not diaphoretic and not pale.   Psychiatric: Her speech is normal and behavior is normal. Mood, judgment and thought content normal.   Nursing note and vitals reviewed.         IMAGING-  XR WRIST COMPLETE 3 VIEWS LEFT    Result Date: 11/21/2022  EXAMINATION: XR WRIST COMPLETE 3 VIEWS LEFT CLINICAL HISTORY: Pain, unspecified TECHNIQUE: PA, lateral, and oblique views of the left wrist were performed. COMPARISON: None FINDINGS: There is mild joint space narrowing at the 1st carpal/metacarpal joint.  There is no evidence of acute fracture or subluxation about the left wrist. Electronically signed by: Ishan Collier MD Date:    11/21/2022 Time:    16:42          Assessment:       1. Tenosynovitis, de Quervain    2. Pain          Plan:       FOLLOWUP  Follow up if symptoms worsen or fail to improve, for PLEASE CONTACT PCP OR CONTACT THE EMERGENCY ROOM..     PATIENT INSTRUCTIONS  Patient Instructions   Follow up with Orthopedics.  Wear splint for comfort.      INSTRUCTIONS:  - Rest.  - Drink plenty of fluids.  - Take Tylenol and/or Ibuprofen as directed as needed for fever/pain.  Do not take more than the recommended dose.  - follow up with your PCP within the next 1-2 weeks as needed.  - You must understand that you have received an Urgent Care treatment  only and that you may be released before all of your medical problems are known or treated.   - You, the patient, will arrange for follow up care as instructed.   - If your condition worsens or fails to improve we recommend that you receive another evaluation at the ER immediately or contact your PCP to discuss your concerns.   - You can call [phone] or [phone] to help schedule an appointment with the appropriate provider.     -If you smoke cigarettes, it would be beneficial for you to stop.       THANK YOU FOR ALLOWING ME TO PARTICIPATE IN YOUR HEALTHCARE,     Dimitry Kendall NP     Tenosynovitis, de Quervain  -     SPLINT FOR HOME USE  -     naproxen (NAPROSYN) 500 MG tablet; Take 1 tablet (500 mg total) by mouth 2 (two) times daily as needed (pain).  Dispense: 30 tablet; Refill: 0  -     Ambulatory referral/consult to Orthopedics    Pain  -     XR WRIST COMPLETE 3 VIEWS LEFT; Future; Expected date: 11/21/2022

## 2022-11-21 NOTE — PATIENT INSTRUCTIONS
Follow up with Orthopedics.  Wear splint for comfort.      INSTRUCTIONS:  - Rest.  - Drink plenty of fluids.  - Take Tylenol and/or Ibuprofen as directed as needed for fever/pain.  Do not take more than the recommended dose.  - follow up with your PCP within the next 1-2 weeks as needed.  - You must understand that you have received an Urgent Care treatment only and that you may be released before all of your medical problems are known or treated.   - You, the patient, will arrange for follow up care as instructed.   - If your condition worsens or fails to improve we recommend that you receive another evaluation at the ER immediately or contact your PCP to discuss your concerns.   - You can call [phone] or [phone] to help schedule an appointment with the appropriate provider.     -If you smoke cigarettes, it would be beneficial for you to stop.

## 2022-11-22 ENCOUNTER — TELEPHONE (OUTPATIENT)
Dept: ORTHOPEDICS | Facility: CLINIC | Age: 62
End: 2022-11-22
Payer: COMMERCIAL

## 2022-11-23 ENCOUNTER — TELEPHONE (OUTPATIENT)
Dept: ORTHOPEDICS | Facility: CLINIC | Age: 62
End: 2022-11-23
Payer: COMMERCIAL

## 2022-11-25 ENCOUNTER — TELEPHONE (OUTPATIENT)
Dept: GASTROENTEROLOGY | Facility: CLINIC | Age: 62
End: 2022-11-25
Payer: COMMERCIAL

## 2022-12-07 ENCOUNTER — TELEPHONE (OUTPATIENT)
Dept: GASTROENTEROLOGY | Facility: CLINIC | Age: 62
End: 2022-12-07
Payer: COMMERCIAL

## 2022-12-09 ENCOUNTER — TELEPHONE (OUTPATIENT)
Dept: GASTROENTEROLOGY | Facility: CLINIC | Age: 62
End: 2022-12-09
Payer: COMMERCIAL

## 2022-12-09 NOTE — TELEPHONE ENCOUNTER
----- Message from Teresa Coleman sent at 12/9/2022 12:43 PM CST -----  Contact: patient  Type:  Patient Returning Call    Who Called: patient     Who Left Message for Patient: Geraldine     Does the patient know what this is regarding?: appt     Would the patient rather a call back or a response via [facility]? Call     Best Call Back Number:[phone] (home)      Additional Information:

## 2022-12-09 NOTE — TELEPHONE ENCOUNTER
"Attempted to reach the patient at the number in the message, spouse answered and stated that she was unavailable and to call her number as we called his number then the spouse sates " I will have her call you back ".  "

## 2022-12-12 ENCOUNTER — TELEPHONE (OUTPATIENT)
Dept: GASTROENTEROLOGY | Facility: CLINIC | Age: 62
End: 2022-12-12
Payer: COMMERCIAL

## 2022-12-12 NOTE — TELEPHONE ENCOUNTER
----- Message from Jacki Morel sent at 12/12/2022  3:29 PM CST -----  Contact: patient  Type: Needs Medical Advice  Who Called:  patient  Best Call Back Number: [phone]  Additional Information: patient needs to schedule an EGD- please advise.

## 2022-12-12 NOTE — TELEPHONE ENCOUNTER
Returned call to the patient, Patient was set up for an EGD, patient verbalized understanding of this.

## 2022-12-14 ENCOUNTER — OFFICE VISIT (OUTPATIENT)
Dept: ORTHOPEDICS | Facility: CLINIC | Age: 62
End: 2022-12-14
Payer: COMMERCIAL

## 2022-12-14 VITALS — BODY MASS INDEX: 37.05 KG/M2 | HEIGHT: 64 IN | WEIGHT: 217 LBS

## 2022-12-14 DIAGNOSIS — M65.4 DE QUERVAIN'S TENOSYNOVITIS, LEFT: ICD-10-CM

## 2022-12-14 DIAGNOSIS — M65.4 DE QUERVAIN'S TENOSYNOVITIS, RIGHT: Primary | ICD-10-CM

## 2022-12-14 PROCEDURE — 20550 NJX 1 TENDON SHEATH/LIGAMENT: CPT | Mod: LT,S$GLB,, | Performed by: ORTHOPAEDIC SURGERY

## 2022-12-14 PROCEDURE — 99203 PR OFFICE/OUTPT VISIT, NEW, LEVL III, 30-44 MIN: ICD-10-PCS | Mod: 25,S$GLB,, | Performed by: ORTHOPAEDIC SURGERY

## 2022-12-14 PROCEDURE — 99999 PR PBB SHADOW E&M-EST. PATIENT-LVL III: ICD-10-PCS | Mod: PBBFAC,,, | Performed by: ORTHOPAEDIC SURGERY

## 2022-12-14 PROCEDURE — 99203 OFFICE O/P NEW LOW 30 MIN: CPT | Mod: 25,S$GLB,, | Performed by: ORTHOPAEDIC SURGERY

## 2022-12-14 PROCEDURE — 20550 TENDON SHEATH: ICD-10-PCS | Mod: LT,S$GLB,, | Performed by: ORTHOPAEDIC SURGERY

## 2022-12-14 PROCEDURE — 99999 PR PBB SHADOW E&M-EST. PATIENT-LVL III: CPT | Mod: PBBFAC,,, | Performed by: ORTHOPAEDIC SURGERY

## 2022-12-14 RX ORDER — TRIAMCINOLONE ACETONIDE 40 MG/ML
40 INJECTION, SUSPENSION INTRA-ARTICULAR; INTRAMUSCULAR
Status: DISCONTINUED | OUTPATIENT
Start: 2022-12-14 | End: 2022-12-14 | Stop reason: HOSPADM

## 2022-12-14 RX ADMIN — TRIAMCINOLONE ACETONIDE 40 MG: 40 INJECTION, SUSPENSION INTRA-ARTICULAR; INTRAMUSCULAR at 01:12

## 2022-12-14 NOTE — PROGRESS NOTES
12/14/2022    Chief Complaint:  Chief Complaint   Patient presents with    Right Wrist - Pain, Swelling       HPI:  Carmen Hernandez is a 62 y.o. female, who presents to clinic today she has a history of pain and swelling to her left left wrist.  States that this has been going on for couple of months but is getting worse.  She has tried wearing a brace but she seems to think that this makes it worse.  She has not had any other treatments    PMHX:  Past Medical History:   Diagnosis Date    Anxiety     Bilateral dry eyes     uses atears//    Cataract     Diabetes mellitus     LBSL 128 today---fluctuates    Diverticulosis     GERD (gastroesophageal reflux disease)     Hyperlipidemia     Hypertension     Irritable bowel syndrome     Nephrolithiasis     Severe obesity (BMI 35.0-39.9) with comorbidity        PSHX:  Past Surgical History:   Procedure Laterality Date    COLONOSCOPY  05/16/2013    PAM.   Diverticulosis in the sigmoid colon.  Tortuous colon.  Irritable bowel syndrome?.  Internal hemorrhoids.  Redundant colon. repeat in 5-6 years    COLONOSCOPY N/A 10/31/2018    Procedure: COLONOSCOPY;  Surgeon: Jr Santos Jr., MD;  Location: [facility];  Service: Endoscopy;  Laterality: N/A;    COLONOSCOPY W/ POLYPECTOMY  5/13/2009  Rohan SPENCER.   One 4 mm polyp in the transverse colon.  HYPERPLASTIC POLYP.    Diverticulosis sigmoid colon.    Tortuous colon.   Granularity hepatic flexure.     CYSTOSCOPY W/ STONE MANIPULATION      ENDOMETRIAL BIOPSY  X2    HYSTERECTOMY  2013    OOPHORECTOMY  2013    UPPER GASTROINTESTINAL ENDOSCOPY  09/26/2017    Dr. Santos       FMHX:  Family History   Problem Relation Age of Onset    Cataracts Mother     Heart disease Mother     Hypertension Mother     Diabetes Father     Hypertension Father     Diabetes Brother     Hypertension Brother     Macular degeneration Neg Hx     Retinal detachment Neg Hx     Strabismus Neg Hx     Stroke Neg Hx     Thyroid disease Neg Hx      Glaucoma Neg Hx     Cancer Neg Hx     Amblyopia Neg Hx     Blindness Neg Hx     Breast cancer Neg Hx     Colon cancer Neg Hx     Colon polyps Neg Hx     Crohn's disease Neg Hx     Ulcerative colitis Neg Hx     Stomach cancer Neg Hx     Esophageal cancer Neg Hx     Celiac disease Neg Hx        SOCHX:  Social History     Tobacco Use    Smoking status: Former     Types: Cigarettes     Quit date: 2013     Years since quittin.3    Smokeless tobacco: Never   Substance Use Topics    Alcohol use: No       ALLERGIES:  Demerol [meperidine], Iodinated contrast media, Bactrim [sulfamethoxazole-trimethoprim], Ciprofloxacin, and Erythromycin    CURRENT MEDICATIONS:  Current Outpatient Medications on File Prior to Visit   Medication Sig Dispense Refill    alcohol swabs (BD ALCOHOL SWABS) PadM USE TO CHECK BLOOD GLUCOSE TWICE DAILY 200 each 0    ALPRAZolam (XANAX) 0.5 MG tablet TAKE 1 TABLET BY MOUTH FOUR TIMES DAILY AS NEEDED 100 tablet 1    blood sugar diagnostic (ONETOUCH VERIO TEST STRIPS) Strp USE TO TEST BLOOD SUGAR 3 TO 4 TIMES DAILY 200 strip 6    doxycycline (MONODOX) 50 MG Cap TAKE 1 CAPSULE BY MOUTH EVERY DAY 30 capsule 3    fluocinonide 0.05% (LIDEX) 0.05 % cream Apply topically 2 (two) times daily. 30 g 1    hydroCHLOROthiazide (HYDRODIURIL) 12.5 MG Tab TAKE 1 TABLET(12.5 MG) BY MOUTH EVERY DAY 90 tablet 3    lisinopriL (PRINIVIL,ZESTRIL) 40 MG tablet TAKE 1 TABLET(40 MG) BY MOUTH EVERY DAY 90 tablet 3    metFORMIN (GLUCOPHAGE) 500 MG tablet TAKE 2 TABLETS BY MOUTH TWICE DAILY 360 tablet 1    naproxen (NAPROSYN) 500 MG tablet Take 1 tablet (500 mg total) by mouth 2 (two) times daily as needed (pain). 30 tablet 0    omeprazole (PRILOSEC) 40 MG capsule TAKE 1 CAPSULE(40 MG) BY MOUTH EVERY DAY 90 capsule 3    ONETOUCH VERIO SYNC kit USE TO TEST BLOOD GLUOCSE TWICE DAILY 1 each 0    pravastatin (PRAVACHOL) 40 MG tablet TAKE 1 TABLET BY MOUTH EVERY DAY 90 tablet 3    SITagliptin (JANUVIA) 25 MG Tab Take 1 tablet  "(25 mg total) by mouth once daily. 30 tablet 11     No current facility-administered medications on file prior to visit.       REVIEW OF SYSTEMS:  Review of Systems   Constitutional: Negative.    HENT: Negative.     Respiratory: Negative.     Cardiovascular: Negative.    Gastrointestinal: Negative.    Genitourinary: Negative.    Musculoskeletal: Negative.    Skin: Negative.    Neurological: Negative.    Endo/Heme/Allergies: Negative.    Psychiatric/Behavioral: Negative.       GENERAL PHYSICAL EXAM:   Ht 5' 4" (1.626 m)   Wt 98.4 kg (217 lb)   BMI 37.25 kg/m²    GEN: well developed, well nourished, no acute distress   HENT: Normocephalic, atraumatic   EYES: No discharge, conjunctiva normal   NECK: Supple, non-tender   PULM: No wheezing, no respiratory distress   CV: RRR   ABD: Soft, non-tender    ORTHO EXAM:   Examination the left hand and wrist reveals that there is no edema.  There are no major skin changes.  Palpation does produce tenderness over the 1st extensor compartment.  She has a positive Finkelstein's test.  She has no other area of tenderness.  She has sensation intact in the median radial ulnar distribution.  Capillary refill is less than 2 seconds in all the digits    RADIOLOGY:   None    ASSESSMENT:   Left wrist de Quervain tenosynovitis    PLAN:  1. I have discussed treatment options with the patient.  After discussion of the risks and benefits of steroid injection and injection was placed to the left wrist 1st extensor compartment.  The patient tolerated that well.    2.  She will follow up on a p.r.n.    "

## 2022-12-14 NOTE — PROCEDURES
Tendon Sheath    Date/Time: 12/14/2022 1:40 PM  Performed by: Harshad Newby MD  Authorized by: Harshad Newby MD     Consent Done?:  Yes (Verbal)  Indications:  Pain  Site marked: the procedure site was marked    Timeout: prior to procedure the correct patient, procedure, and site was verified    Prep: patient was prepped and draped in usual sterile fashion      Local anesthesia used?: Yes    Local anesthetic:  Lidocaine 1% without epinephrine  Anesthetic total (ml):  0.5    Location:  Wrist  Site:  L first doral compartment  Medications:  40 mg triamcinolone acetonide 40 mg/mL  Patient tolerance:  Patient tolerated the procedure well with no immediate complications

## 2022-12-19 ENCOUNTER — TELEPHONE (OUTPATIENT)
Dept: INTERNAL MEDICINE | Facility: CLINIC | Age: 62
End: 2022-12-19
Payer: COMMERCIAL

## 2022-12-19 NOTE — TELEPHONE ENCOUNTER
----- Message from Jaci Pascual sent at 12/19/2022  3:48 PM CST -----  Contact: ADIS ROMEO [[id number]]@  [phone]  Requesting an RX refill or new RX.  Is this a refill or new RX:Refill   RX name and strength (copy/paste from chart): ONETOUCH VERIO SYNC kit   Is this a 30 day or 90 day RX: 90  Pharmacy name and phone # (copy/paste from chart): [facility]  Phone: [phone] Fax: [phone]  The doctors have asked that we provide their patients with the following 2 reminders -- prescription refills can take up to 72 hours, and a friendly reminder that in the future you can use your [facility] account to request refills:

## 2023-01-30 ENCOUNTER — TELEPHONE (OUTPATIENT)
Dept: OBSTETRICS AND GYNECOLOGY | Facility: CLINIC | Age: 63
End: 2023-01-30
Payer: COMMERCIAL

## 2023-01-30 DIAGNOSIS — Z12.31 ENCOUNTER FOR SCREENING MAMMOGRAM FOR BREAST CANCER: Primary | ICD-10-CM

## 2023-01-30 NOTE — TELEPHONE ENCOUNTER
----- Message from Mary Kate Gupta sent at 1/30/2023  2:25 PM CST -----  Contact: patient  Type:  Patient Call          Who Called: Patient         Does the patient know what this is regarding?: requesting a call back about having mammo orders ; please advise           Would the patient rather a call back or a response via [facility]? Call           Best Call Back Number: [phone]             Additional Information:

## 2023-01-30 NOTE — TELEPHONE ENCOUNTER
Spoke with pt and informed her that her mammogram order has been placed and she can schedule it after 3/9/23. Pt verbalized understanding.

## 2023-02-02 RX ORDER — ALPRAZOLAM 0.5 MG/1
0.5 TABLET ORAL 4 TIMES DAILY PRN
Qty: 100 TABLET | Refills: 0 | Status: CANCELLED | OUTPATIENT
Start: 2023-02-02

## 2023-03-01 DIAGNOSIS — Z78.0 MENOPAUSE: ICD-10-CM

## 2023-03-11 ENCOUNTER — HOSPITAL ENCOUNTER (OUTPATIENT)
Dept: RADIOLOGY | Facility: HOSPITAL | Age: 63
Discharge: HOME | End: 2023-03-11
Attending: OBSTETRICS & GYNECOLOGY
Payer: MEDICARE

## 2023-03-11 DIAGNOSIS — Z12.31 ENCOUNTER FOR SCREENING MAMMOGRAM FOR BREAST CANCER: ICD-10-CM

## 2023-03-11 PROCEDURE — 77067 MAMMO DIGITAL SCREENING BILAT WITH TOMO: ICD-10-PCS | Mod: 26,,, | Performed by: RADIOLOGY

## 2023-03-11 PROCEDURE — 77067 SCR MAMMO BI INCL CAD: CPT | Mod: 26,,, | Performed by: RADIOLOGY

## 2023-03-11 PROCEDURE — 77063 MAMMO DIGITAL SCREENING BILAT WITH TOMO: ICD-10-PCS | Mod: 26,,, | Performed by: RADIOLOGY

## 2023-03-11 PROCEDURE — 77067 SCR MAMMO BI INCL CAD: CPT | Mod: TC,PO

## 2023-03-11 PROCEDURE — 77063 BREAST TOMOSYNTHESIS BI: CPT | Mod: 26,,, | Performed by: RADIOLOGY

## 2023-03-13 ENCOUNTER — ANESTHESIA EVENT (OUTPATIENT)
Dept: ENDOSCOPY | Facility: HOSPITAL | Age: 63
End: 2023-03-13
Payer: MEDICARE

## 2023-03-13 NOTE — H&P
History & Physical - Short Stay  Gastroenterology      SUBJECTIVE:     Procedure: Gastroscopy    Chief Complaint/Indication for Procedure:  GERD.  Dysphagia(?)    History of Present Illness:  See recent GI OV note:  Office Visit   9/28/2022  [facility]    Jean Girard MD  Gastroenterology Gastroesophageal reflux disease with esophagitis without hemorrhage +4 more  Dx Follow-up  Reason for Visit     Progress Notes    Jean Girard MD at 9/28/2022 11:00 AM    Status: Signed                                       [facility] Established Patient Visit     Reason for Visit:        Chief Complaint   Patient presents with    Follow-up         PCP: LUKAS Bentley        HPI:  Carmen Hernandez is a 62 y.o. female here for follow-up of GERD and LPR.  I last saw her in August of 2021.  At that time, she had stable symptoms on omeprazole 40 mg daily.  She is here for a 1 year follow-up.  She is still on 40 mg every morning.  She is noticed an intermittent sticking sensation in the morning when trying to eat breakfast.  She thinks this could be related to dry mouth.  She has a dry mouth every morning that she wakes.  The symptoms go away throughout the day.  She reports no worsening of her cough.  She still has a mild cough after drinking water.  She reports no other new symptoms.  She denies abdominal pain.     Her last EGD was 09/26/2017 with Dr. Santos.  Report reviewed.  No concerning findings except for grade a esophagitis.     Last colonoscopy 10/31/2018, also with Dr. Santos for change in bowel patterns.  The exam was complete with intubation of terminal ileum.  Bowel preparation was excellent.  A 2 mm tubular adenoma was removed the transverse colon.  Diverticulosis is noted in the sigmoid colon.  Nonbleeding internal hemorrhoids were seen.  Random colon biopsies were unremarkable.  The terminal ileum was normal.  Repeat was recommended in 5 years.             ROS:  Constitutional: No fevers, chills, No weight loss, normal appetite  GI: see HPI              PMHX:  has a past medical history of Anxiety, Bilateral dry eyes, Cataract, Diabetes mellitus, Diverticulosis, GERD (gastroesophageal reflux disease), Hyperlipidemia, Hypertension, Irritable bowel syndrome, Nephrolithiasis, and Severe obesity (BMI 35.0-39.9) with comorbidity.     PSHX:  has a past surgical history that includes Endometrial biopsy (X2); Cystoscopy w/ stone manipulation; Oophorectomy (2013); Hysterectomy (2013); Colonoscopy w/ polypectomy (5/13/2009  [facility]); Colonoscopy (05/16/2013); Upper gastrointestinal endoscopy (09/26/2017); and Colonoscopy (N/A, 10/31/2018).      Assessment:  Carmen Hernandez is a 62 y.o. female here with:  1. Gastroesophageal reflux disease with esophagitis without hemorrhage    2. Laryngopharyngeal reflux (LPR)    3. Intermittent dysphagia    4. Xerostomia    5. History of colon polyps       GERD and LPR which are longstanding problems for her.  General resolution of symptoms with 40 mg of omeprazole taken every morning.  She has reported some intermittent dysphagia like symptoms with eating breakfast that resolves throughout the day.  She reports a dry mouth, which she thinks may be contributing to this.  She has intermittent coughing with drinking water.  Last EGD was in September 2017 with grade a esophagitis noted.  The symptoms could be indicative of active inflammation or a stricture in the esophagus.  Esophageal diverticulum is another consideration, but none were seen during last EGD.  Hiatal hernia is a consideration.     Personal history of colon polyps with a small adenoma removed in October 2018.  She will be due for colonoscopy in October 2023.     The patient lives in [place], [place].  Finds it difficult to come to [place] for appointments, and inquires about follow-up with a physician on the [place].  She has seen Dr. Santos in the  past.        Recommendations:  1. Continue omeprazole 40 mg q.a.m.  2. I will arrange for an EGD to assess symptoms further.  Who prefers to have this done on the [place].  Order placed and given number for the [facility] scheduling team.  She may discuss follow-up with the performing physician at that time.  3. Colonoscopy in October 2023 for surveillance purposes.        Follow-up with GI in [place].            See last Upper GI endoscopy 9/26/2017:  Indications:         Suspected esophageal reflux, Globus sensation,                        Laryngitis, Sore throat (LPR)   Impression:  - Normal oropharynx.                        - Normal proximal esophagus and mid esophagus.                        - LA Grade A reflux esophagitis.                        - Z-line regular, 36 cm from the incisors.                        - Chronic gastritis. Biopsied.                        - Normal stomach otherwise.                        - Normal examined duodenum.   Recommendation:      - Discharge patient to home.                        - Await pathology and CLOtest results.                        - Follow an antireflux regimen.                        - Use Prilosec (omeprazole) 40 mg PO daily.                        - Use Zantac (ranitidine) 300 mg PO nighty.                        - Call the G.I. clinic in 2 weeks for reports (if                        you haven't heard from us sooner) [phone].                        - Return to GI clinic in 6-8 weeks.   Jr Santos MD   9/26/2017 2:53:56 PM        The LAURI Test performed on 09/26/17 was Negative.   Shahzad Plata, Admin.   For: Jr Santos MD   9/27/2017             PTA Medications   Medication Sig    ALPRAZolam (XANAX) 0.5 MG tablet TAKE 1 TABLET BY MOUTH FOUR TIMES DAILY AS NEEDED    doxycycline (MONODOX) 50 MG Cap TAKE 1 CAPSULE BY MOUTH EVERY DAY    hydroCHLOROthiazide (HYDRODIURIL) 12.5 MG Tab TAKE 1 TABLET(12.5 MG) BY MOUTH EVERY DAY     lisinopriL (PRINIVIL,ZESTRIL) 40 MG tablet TAKE 1 TABLET(40 MG) BY MOUTH EVERY DAY    metFORMIN (GLUCOPHAGE) 500 MG tablet TAKE 2 TABLETS BY MOUTH TWICE DAILY    omeprazole (PRILOSEC) 40 MG capsule TAKE 1 CAPSULE(40 MG) BY MOUTH EVERY DAY    ONETOUCH VERIO TEST STRIPS Strp USE TO TEST BLOOD SUGAR 3 TO 4 TIMES DAILY    pravastatin (PRAVACHOL) 40 MG tablet TAKE 1 TABLET BY MOUTH EVERY DAY    SITagliptin (JANUVIA) 25 MG Tab Take 1 tablet (25 mg total) by mouth once daily.    alcohol swabs (BD ALCOHOL SWABS) PadM USE TO CHECK BLOOD GLUCOSE TWICE DAILY    fluocinonide 0.05% (LIDEX) 0.05 % cream Apply topically 2 (two) times daily.    naproxen (NAPROSYN) 500 MG tablet Take 1 tablet (500 mg total) by mouth 2 (two) times daily as needed (pain).    ONETOUCH VERIO SYNC kit USE TO TEST BLOOD GLUOCSE TWICE DAILY       Review of patient's allergies indicates:   Allergen Reactions    Demerol [meperidine] Hives    Iodinated contrast media Hives    Bactrim [sulfamethoxazole-trimethoprim] Other (See Comments)     Other reaction(s): blotchy skin    Ciprofloxacin Other (See Comments)     Other reaction(s): blotchy skin    Erythromycin Other (See Comments)     tachycardia        Past Medical History:   Diagnosis Date    Anxiety     Bilateral dry eyes     uses atears//    Cataract     Diabetes mellitus     LBSL 128 today---fluctuates    Diverticulosis     GERD (gastroesophageal reflux disease)     Hyperlipidemia     Hypertension     Irritable bowel syndrome     Nephrolithiasis     Severe obesity (BMI 35.0-39.9) with comorbidity      Past Surgical History:   Procedure Laterality Date    COLONOSCOPY  05/16/2013    PAM.   Diverticulosis in the sigmoid colon.  Tortuous colon.  Irritable bowel syndrome?.  Internal hemorrhoids.  Redundant colon. repeat in 5-6 years    COLONOSCOPY N/A 10/31/2018    Procedure: COLONOSCOPY;  Surgeon: Jr Santos Jr., MD;  Location: [facility];  Service: Endoscopy;  Laterality: N/A;    COLONOSCOPY W/  "POLYPECTOMY  2009  oRhan SPENCER.   One 4 mm polyp in the transverse colon.  HYPERPLASTIC POLYP.    Diverticulosis sigmoid colon.    Tortuous colon.   Granularity hepatic flexure.     CYSTOSCOPY W/ STONE MANIPULATION      ENDOMETRIAL BIOPSY  X2    HYSTERECTOMY  2013    OOPHORECTOMY  2013    UPPER GASTROINTESTINAL ENDOSCOPY  2017    Dr. Santos     Family History   Problem Relation Age of Onset    Cataracts Mother     Heart disease Mother     Hypertension Mother     Diabetes Father     Hypertension Father     Diabetes Brother     Hypertension Brother     Macular degeneration Neg Hx     Retinal detachment Neg Hx     Strabismus Neg Hx     Stroke Neg Hx     Thyroid disease Neg Hx     Glaucoma Neg Hx     Cancer Neg Hx     Amblyopia Neg Hx     Blindness Neg Hx     Breast cancer Neg Hx     Colon cancer Neg Hx     Colon polyps Neg Hx     Crohn's disease Neg Hx     Ulcerative colitis Neg Hx     Stomach cancer Neg Hx     Esophageal cancer Neg Hx     Celiac disease Neg Hx      Social History     Tobacco Use    Smoking status: Former     Types: Cigarettes     Quit date: 2013     Years since quittin.5    Smokeless tobacco: Never   Substance Use Topics    Alcohol use: No    Drug use: No         OBJECTIVE:     Vital Signs (Most Recent)  Temp: 98.1 °F (36.7 °C) (23)  Pulse: 90 (23)  Resp: 17 (23)  BP: (!) 155/67 (23)  SpO2: 98 % (23)    Physical Exam:  :  Ht: 5' 4" (162.6 cm)   Wt: 98.4 kg (217 lb)   BMI: 37.25 kg/m²   .                                                   GENERAL:  Comfortable, in no acute distress.                                 HEENT EXAM:  Nonicteric.  No adenopathy.  Oropharynx is clear.               NECK:  Supple.                                                               LUNGS:  Clear.                                                               CARDIAC:  Regular rate and rhythm.  S1, S2.  No murmur.                    "   ABDOMEN:  Obese.   Soft, positive bowel sounds, nontender.  No hepatosplenomegaly or masses.  No rebound or guarding.                                             EXTREMITIES:  No edema.     MENTAL STATUS:  Alert and oriented.    ASSESSMENT/PLAN:     Assessment: GERD.  Dysphagia(?)    Plan: Gastroscopy    Anesthesia Plan:   MAC / General Anaesthesia    ASA Grade: ASA 2 - Patient with mild systemic disease with no functional limitations    MALLAMPATI SCORE: II (hard and soft palate, upper portion of tonsils anduvula visible)

## 2023-03-14 ENCOUNTER — ANESTHESIA (OUTPATIENT)
Dept: ENDOSCOPY | Facility: HOSPITAL | Age: 63
End: 2023-03-14
Payer: MEDICARE

## 2023-03-14 ENCOUNTER — HOSPITAL ENCOUNTER (OUTPATIENT)
Facility: HOSPITAL | Age: 63
Discharge: HOME | End: 2023-03-14
Attending: INTERNAL MEDICINE | Admitting: INTERNAL MEDICINE
Payer: MEDICARE

## 2023-03-14 DIAGNOSIS — R13.10 DYSPHAGIA: ICD-10-CM

## 2023-03-14 LAB — GLUCOSE SERPL-MCNC: 192 MG/DL (ref 70–110)

## 2023-03-14 PROCEDURE — D9220A PRA ANESTHESIA: ICD-10-PCS | Mod: CRNA,,, | Performed by: STUDENT IN AN ORGANIZED HEALTH CARE EDUCATION/TRAINING PROGRAM

## 2023-03-14 PROCEDURE — 43248 EGD GUIDE WIRE INSERTION: CPT | Mod: ,,, | Performed by: INTERNAL MEDICINE

## 2023-03-14 PROCEDURE — 37000009 HC ANESTHESIA EA ADD 15 MINS: Mod: PO | Performed by: INTERNAL MEDICINE

## 2023-03-14 PROCEDURE — 88342 IMHCHEM/IMCYTCHM 1ST ANTB: CPT | Performed by: PATHOLOGY

## 2023-03-14 PROCEDURE — 63600175 PHARM REV CODE 636 W HCPCS: Mod: PO | Performed by: INTERNAL MEDICINE

## 2023-03-14 PROCEDURE — 88305 TISSUE EXAM BY PATHOLOGIST: CPT | Mod: 26,,, | Performed by: PATHOLOGY

## 2023-03-14 PROCEDURE — D9220A PRA ANESTHESIA: ICD-10-PCS | Mod: ANES,,, | Performed by: ANESTHESIOLOGY

## 2023-03-14 PROCEDURE — 63600175 PHARM REV CODE 636 W HCPCS: Mod: PO | Performed by: STUDENT IN AN ORGANIZED HEALTH CARE EDUCATION/TRAINING PROGRAM

## 2023-03-14 PROCEDURE — C1769 GUIDE WIRE: HCPCS | Mod: PO | Performed by: INTERNAL MEDICINE

## 2023-03-14 PROCEDURE — 88305 TISSUE EXAM BY PATHOLOGIST: CPT | Performed by: PATHOLOGY

## 2023-03-14 PROCEDURE — 88305 TISSUE EXAM BY PATHOLOGIST: ICD-10-PCS | Mod: 26,,, | Performed by: PATHOLOGY

## 2023-03-14 PROCEDURE — 25000003 PHARM REV CODE 250: Mod: PO | Performed by: STUDENT IN AN ORGANIZED HEALTH CARE EDUCATION/TRAINING PROGRAM

## 2023-03-14 PROCEDURE — 43248 PR EGD, FLEX, W/DILATION OVER GUIDEWIRE: ICD-10-PCS | Mod: ,,, | Performed by: INTERNAL MEDICINE

## 2023-03-14 PROCEDURE — 37000008 HC ANESTHESIA 1ST 15 MINUTES: Mod: PO | Performed by: INTERNAL MEDICINE

## 2023-03-14 PROCEDURE — D9220A PRA ANESTHESIA: Mod: ANES,,, | Performed by: ANESTHESIOLOGY

## 2023-03-14 PROCEDURE — 82962 GLUCOSE BLOOD TEST: CPT | Mod: PO | Performed by: INTERNAL MEDICINE

## 2023-03-14 PROCEDURE — 43239 PR EGD, FLEX, W/BIOPSY, SGL/MULTI: ICD-10-PCS | Mod: 59,,, | Performed by: INTERNAL MEDICINE

## 2023-03-14 PROCEDURE — 27201012 HC FORCEPS, HOT/COLD, DISP: Mod: PO | Performed by: INTERNAL MEDICINE

## 2023-03-14 PROCEDURE — 88342 IMHCHEM/IMCYTCHM 1ST ANTB: CPT | Mod: 26,,, | Performed by: PATHOLOGY

## 2023-03-14 PROCEDURE — 43239 EGD BIOPSY SINGLE/MULTIPLE: CPT | Mod: 59,PO | Performed by: INTERNAL MEDICINE

## 2023-03-14 PROCEDURE — 43248 EGD GUIDE WIRE INSERTION: CPT | Mod: PO | Performed by: INTERNAL MEDICINE

## 2023-03-14 PROCEDURE — 88342 CHG IMMUNOCYTOCHEMISTRY: ICD-10-PCS | Mod: 26,,, | Performed by: PATHOLOGY

## 2023-03-14 PROCEDURE — 43239 EGD BIOPSY SINGLE/MULTIPLE: CPT | Mod: 59,,, | Performed by: INTERNAL MEDICINE

## 2023-03-14 PROCEDURE — D9220A PRA ANESTHESIA: Mod: CRNA,,, | Performed by: STUDENT IN AN ORGANIZED HEALTH CARE EDUCATION/TRAINING PROGRAM

## 2023-03-14 RX ORDER — SODIUM CHLORIDE 0.9 % (FLUSH) 0.9 %
10 SYRINGE (ML) INJECTION
Status: DISCONTINUED | OUTPATIENT
Start: 2023-03-14 | End: 2023-03-14 | Stop reason: HOSPADM

## 2023-03-14 RX ORDER — SODIUM CHLORIDE, SODIUM LACTATE, POTASSIUM CHLORIDE, CALCIUM CHLORIDE 600; 310; 30; 20 MG/100ML; MG/100ML; MG/100ML; MG/100ML
INJECTION, SOLUTION INTRAVENOUS CONTINUOUS
Status: DISCONTINUED | OUTPATIENT
Start: 2023-03-14 | End: 2023-03-14 | Stop reason: HOSPADM

## 2023-03-14 RX ORDER — LIDOCAINE HYDROCHLORIDE 20 MG/ML
INJECTION INTRAVENOUS
Status: DISCONTINUED | OUTPATIENT
Start: 2023-03-14 | End: 2023-03-14

## 2023-03-14 RX ORDER — PROPOFOL 10 MG/ML
VIAL (ML) INTRAVENOUS
Status: DISCONTINUED | OUTPATIENT
Start: 2023-03-14 | End: 2023-03-14

## 2023-03-14 RX ADMIN — PROPOFOL 50 MG: 10 INJECTION, EMULSION INTRAVENOUS at 09:03

## 2023-03-14 RX ADMIN — PROPOFOL 100 MG: 10 INJECTION, EMULSION INTRAVENOUS at 09:03

## 2023-03-14 RX ADMIN — SODIUM CHLORIDE, POTASSIUM CHLORIDE, SODIUM LACTATE AND CALCIUM CHLORIDE: 600; 310; 30; 20 INJECTION, SOLUTION INTRAVENOUS at 09:03

## 2023-03-14 RX ADMIN — LIDOCAINE HYDROCHLORIDE 100 MG: 20 INJECTION INTRAVENOUS at 09:03

## 2023-03-14 NOTE — ANESTHESIA POSTPROCEDURE EVALUATION
Anesthesia Post Evaluation    Patient: Carmen Hernandez    Procedure(s) Performed: Procedure(s) (LRB):  EGD (ESOPHAGOGASTRODUODENOSCOPY) (N/A)    Final Anesthesia Type: general      Patient location during evaluation: PACU  Patient participation: Yes- Able to Participate  Level of consciousness: awake and alert and oriented  Post-procedure vital signs: reviewed and stable  Pain management: adequate  Airway patency: patent    PONV status at discharge: No PONV  Anesthetic complications: no      Cardiovascular status: blood pressure returned to baseline and stable  Respiratory status: unassisted and spontaneous ventilation  Hydration status: euvolemic  Follow-up not needed.          Vitals Value Taken Time   /61 03/14/23 1006   Temp   03/14/23 1446   Pulse 86 03/14/23 1006   Resp 17 03/14/23 1006   SpO2 98 % 03/14/23 1006         Event Time   Out of Recovery 10:15:00         Pain/Darshana Score: Darshana Score: 10 (3/14/2023 10:04 AM)

## 2023-03-14 NOTE — TRANSFER OF CARE
"Anesthesia Transfer of Care Note    Patient: Carmen Hernandez    Procedure(s) Performed: Procedure(s) (LRB):  EGD (ESOPHAGOGASTRODUODENOSCOPY) (N/A)    Patient location: PACU    Anesthesia Type: general    Transport from OR: Transported from OR on room air with adequate spontaneous ventilation    Post pain: adequate analgesia    Post assessment: no apparent anesthetic complications    Post vital signs: stable    Level of consciousness: lethargic and responds to stimulation    Nausea/Vomiting: no nausea/vomiting    Complications: none    Transfer of care protocol was followed      Last vitals:   Visit Vitals  BP (!) 155/67 (BP Location: Right arm, Patient Position: Lying)   Pulse 90   Temp 36.7 °C (98.1 °F) (Temporal)   Resp 17   Ht 5' 4" (1.626 m)   Wt 98.4 kg (217 lb)   SpO2 98%   Breastfeeding No   BMI 37.25 kg/m²     "

## 2023-03-14 NOTE — PATIENT INSTRUCTIONS
Recovery After Procedural Sedation (Adult)   You have been given medicine by vein to make you sleep during your surgery. This may have included both a pain medicine and sleeping medicine. Most of the effects have worn off. But you may still have some drowsiness for the next 6 to 8 hours.  Home care  Follow these guidelines when you get home:  For the next 8 hours, you should be watched by a responsible adult. This person should make sure your condition is not getting worse.  Don't drink any alcohol for the next 24 hours.  Don't drive, operate dangerous machinery, or make important business or personal decisions during the next 24 hours.  To prevent injury or falls, use caution when standing and walking for at least 24 hours after your procedure.  Note: Your healthcare provider may tell you not to take any medicine by mouth for pain or sleep in the next 4 hours. These medicines may react with the medicines you were given in the hospital. This could cause a much stronger response than usual.  Follow-up care  Follow up with your healthcare provider if you are not alert and back to your usual level of activity within 12 hours.  When to seek medical advice  Call your healthcare provider right away if any of these occur:  Drowsiness gets worse  Weakness or dizziness gets worse  Repeated vomiting  You can't be awakened  Fever  New rash  [employer] last reviewed this educational content on 9/1/2019  © 1390-6132 [employer]. [address] [place] [address], [place] [address]. All rights reserved. This information is not intended as a substitute for professional medical care. Always follow your healthcare professional's instructions         Tips to Control Acid Reflux    To control acid reflux, youll need to make some basic diet and lifestyle changes. The simple steps outlined below may be all youll need to ease discomfort.  Watch what you eat  Avoid fatty foods and spicy foods.  Eat fewer acidic foods, such as citrus  and tomato-based foods. These can increase symptoms.  Limit drinking alcohol, caffeine, and fizzy beverages. All increase acid reflux.  Try limiting chocolate, peppermint, and spearmint. These can worsen acid reflux in some people.  Watch when you eat  Avoid lying down for 3 hours after eating.  Do not snack before going to bed.  Raise your head  Raising your head and upper body by 4 to 6 inches helps limit reflux when youre lying down. Put blocks under the head of your bed frame to raise it.  Other changes  Lose weight, if you need to  Dont exercise near bedtime  Avoid tight-fitting clothes  Limit aspirin and ibuprofen  Stop smoking   Date Last Reviewed: 7/1/2016  © 7646-0137 [employer]. [address] [place] [address], [place] [address]. All rights reserved. This information is not intended as a substitute for professional medical care. Always follow your healthcare professional's instructions.       Especially:   Exercise and weight loss.

## 2023-03-14 NOTE — PROVATION PATIENT INSTRUCTIONS
Discharge Summary/Instructions after an Endoscopic Procedure  Patient Name: Carmen Henrandez  Patient MRN: [id number]  Patient YOB: 1960 Tuesday, March 14, 2023  Jr Santos MD  Dear patient,  As a result of recent federal legislation (The Federal Cures Act), you may   receive lab or pathology results from your procedure in your [facility]   account before your physician is able to contact you. Your physician or   their representative will relay the results to you with their   recommendations at their soonest availability.  Thank you,  RESTRICTIONS:  During your procedure today, you received medications for sedation.  These   medications may affect your judgment, balance and coordination.  Therefore,   for 24 hours, you have the following restrictions:   - DO NOT drive a car, operate machinery, make legal/financial decisions,   sign important papers or drink alcohol.    ACTIVITY:  Today: no heavy lifting, straining or running due to procedural   sedation/anesthesia.  The following day: return to full activity including work.  DIET:  Eat and drink normally unless instructed otherwise.     TREATMENT FOR COMMON SIDE EFFECTS:  - Mild abdominal pain, nausea, belching, bloating or excessive gas:  rest,   eat lightly and use a heating pad.  - Sore Throat: treat with throat lozenges and/or gargle with warm salt   water.  - Because air was used during the procedure, expelling large amounts of air   from your rectum or belching is normal.  - If a bowel prep was taken, you may not have a bowel movement for 1-3 days.    This is normal.  SYMPTOMS TO WATCH FOR AND REPORT TO YOUR PHYSICIAN:  1. Abdominal pain or bloating, other than gas cramps.  2. Chest pain.  3. Back pain.  4. Signs of infection such as: chills or fever occurring within 24 hours   after the procedure.  5. Rectal bleeding, which would show as bright red, maroon, or black stools.   (A tablespoon of blood from the rectum is not serious,  especially if   hemorrhoids are present.)  6. Vomiting.  7. Weakness or dizziness.  GO DIRECTLY TO THE NEAREST EMERGENCY ROOM IF YOU HAVE ANY OF THE FOLLOWING:      Difficulty breathing              Chills and/or fever over 101 F   Persistent vomiting and/or vomiting blood   Severe abdominal pain   Severe chest pain   Black, tarry stools   Bleeding- more than one tablespoon   Any other symptom or condition that you feel may need urgent attention  Your doctor recommends these additional instructions:  If any biopsies were taken, your doctors clinic will contact you in 1 to 2   weeks with any results.     Follow an antireflux regimen.  This includes:       - Do not lie down for at least 3 to 4 hours after meals.        - Raise the head of the bed 4 to 6 inches.        - Decrease excess weight.        - Avoid citrus juices and other acidic foods, alcohol, chocolate, mints,   coffee and other caffeinated beverages, carbonated beverages, fatty and   fried foods.        - Avoid tight-fitting clothing.        - Avoid cigarettes and other tobacco products.   Especially:   Exercise and weight loss.     Continue your present medications.   Take Prilosec (omeprazole) 40 mg by mouth once a day.     Your physician has recommended a repeat upper endoscopy as needed for   retreatment.  For questions, problems or results please call your physician - Jr Santos MD at Work:  [phone].  EMERGENCY PHONE NUMBER: [phone], LAB RESULTS: [phone]  IF A COMPLICATION OR EMERGENCY SITUATION ARISES AND YOU ARE UNABLE TO REACH   YOUR PHYSICIAN - GO DIRECTLY TO THE EMERGENCY ROOM.  ___________________________________________  Nurse Signature  ___________________________________________  Patient/Designated Responsible Party Signature  Jr Santos MD  3/14/2023 10:11:00 AM  This report has been verified and signed electronically.  Dear patient,  As a result of recent federal legislation (The Federal Cures Act),  you may   receive lab or pathology results from your procedure in your [facility]   account before your physician is able to contact you. Your physician or   their representative will relay the results to you with their   recommendations at their soonest availability.  Thank you.  PROVATION

## 2023-03-14 NOTE — ANESTHESIA PREPROCEDURE EVALUATION
03/14/2023  Carmen Hernandez is a 63 y.o., female.      Pre-op Assessment    I have reviewed the Patient Summary Reports.     I have reviewed the Nursing Notes. I have reviewed the NPO Status.   I have reviewed the Medications.     Review of Systems  Anesthesia Hx:  No problems with previous Anesthesia    Social:  Former Smoker    Cardiovascular:   Hypertension, well controlled hyperlipidemia    Pulmonary:  Pulmonary Normal    Renal/:   Chronic Renal Disease renal calculi    Hepatic/GI:   GERD, well controlled IBS   Neurological:  Neurology Normal    Endocrine:   Diabetes, well controlled, type 2  Obesity / BMI > 30, Morbid Obesity / BMI > 40  Psych:   Psychiatric History anxiety Daily xanax          Physical Exam  General: Well nourished, Cooperative, Alert and Oriented    Airway:  Mallampati: II   Mouth Opening: Normal  TM Distance: Normal  Neck ROM: Normal ROM        Anesthesia Plan  Type of Anesthesia, risks & benefits discussed:    Anesthesia Type: Gen ETT, Gen Supraglottic Airway, Gen Natural Airway, MAC  Intra-op Monitoring Plan: Standard ASA Monitors  Post Op Pain Control Plan: multimodal analgesia  Induction:  IV  Airway Plan: Direct, Video and Fiberoptic, Post-Induction  Informed Consent: Informed consent signed with the Patient and all parties understand the risks and agree with anesthesia plan.  All questions answered.   ASA Score: 3    Ready For Surgery From Anesthesia Perspective.     .

## 2023-03-14 NOTE — PLAN OF CARE
Plan of care discussed with patient. Procedure education provided. All questions and concerns answered. Patient verbalizes understanding.     Purse and glasses given to  by patient.

## 2023-03-15 VITALS
HEIGHT: 64 IN | WEIGHT: 217 LBS | TEMPERATURE: 98 F | RESPIRATION RATE: 17 BRPM | SYSTOLIC BLOOD PRESSURE: 131 MMHG | HEART RATE: 86 BPM | DIASTOLIC BLOOD PRESSURE: 61 MMHG | BODY MASS INDEX: 37.05 KG/M2 | OXYGEN SATURATION: 98 %

## 2023-03-16 ENCOUNTER — TELEPHONE (OUTPATIENT)
Dept: GASTROENTEROLOGY | Facility: CLINIC | Age: 63
End: 2023-03-16
Payer: MEDICARE

## 2023-03-20 LAB
FINAL PATHOLOGIC DIAGNOSIS: NORMAL
GROSS: NORMAL
Lab: NORMAL
MICROSCOPIC EXAM: NORMAL

## 2023-04-20 ENCOUNTER — TELEPHONE (OUTPATIENT)
Dept: GASTROENTEROLOGY | Facility: CLINIC | Age: 63
End: 2023-04-20
Payer: MEDICARE

## 2023-04-21 ENCOUNTER — LAB VISIT (OUTPATIENT)
Dept: LAB | Facility: HOSPITAL | Age: 63
End: 2023-04-21
Attending: INTERNAL MEDICINE
Payer: MEDICARE

## 2023-04-21 ENCOUNTER — OFFICE VISIT (OUTPATIENT)
Dept: INTERNAL MEDICINE | Facility: CLINIC | Age: 63
End: 2023-04-21
Payer: MEDICARE

## 2023-04-21 VITALS
WEIGHT: 213.88 LBS | SYSTOLIC BLOOD PRESSURE: 138 MMHG | TEMPERATURE: 97 F | HEART RATE: 102 BPM | DIASTOLIC BLOOD PRESSURE: 68 MMHG | RESPIRATION RATE: 16 BRPM | OXYGEN SATURATION: 97 % | BODY MASS INDEX: 36.51 KG/M2 | HEIGHT: 64 IN

## 2023-04-21 DIAGNOSIS — E78.5 DYSLIPIDEMIA ASSOCIATED WITH TYPE 2 DIABETES MELLITUS: ICD-10-CM

## 2023-04-21 DIAGNOSIS — E11.9 TYPE 2 DIABETES MELLITUS WITHOUT COMPLICATION, WITHOUT LONG-TERM CURRENT USE OF INSULIN: ICD-10-CM

## 2023-04-21 DIAGNOSIS — I15.2 HYPERTENSION ASSOCIATED WITH DIABETES: Primary | ICD-10-CM

## 2023-04-21 DIAGNOSIS — I15.2 HYPERTENSION ASSOCIATED WITH DIABETES: ICD-10-CM

## 2023-04-21 DIAGNOSIS — E11.59 HYPERTENSION ASSOCIATED WITH DIABETES: ICD-10-CM

## 2023-04-21 DIAGNOSIS — D12.6 ADENOMATOUS POLYP OF COLON, UNSPECIFIED PART OF COLON: ICD-10-CM

## 2023-04-21 DIAGNOSIS — E11.59 HYPERTENSION ASSOCIATED WITH DIABETES: Primary | ICD-10-CM

## 2023-04-21 DIAGNOSIS — E66.01 SEVERE OBESITY (BMI 35.0-39.9) WITH COMORBIDITY: ICD-10-CM

## 2023-04-21 DIAGNOSIS — E11.69 DYSLIPIDEMIA ASSOCIATED WITH TYPE 2 DIABETES MELLITUS: ICD-10-CM

## 2023-04-21 LAB
ALBUMIN SERPL BCP-MCNC: 3.8 G/DL (ref 3.5–5.2)
ALP SERPL-CCNC: 63 U/L (ref 55–135)
ALT SERPL W/O P-5'-P-CCNC: 26 U/L (ref 10–44)
ANION GAP SERPL CALC-SCNC: 10 MMOL/L (ref 8–16)
AST SERPL-CCNC: 22 U/L (ref 10–40)
BILIRUB SERPL-MCNC: 0.2 MG/DL (ref 0.1–1)
BUN SERPL-MCNC: 16 MG/DL (ref 8–23)
CALCIUM SERPL-MCNC: 10.5 MG/DL (ref 8.7–10.5)
CHLORIDE SERPL-SCNC: 102 MMOL/L (ref 95–110)
CO2 SERPL-SCNC: 24 MMOL/L (ref 23–29)
CREAT SERPL-MCNC: 0.7 MG/DL (ref 0.5–1.4)
EST. GFR  (NO RACE VARIABLE): >60 ML/MIN/1.73 M^2
ESTIMATED AVG GLUCOSE: 148 MG/DL (ref 68–131)
GLUCOSE SERPL-MCNC: 160 MG/DL (ref 70–110)
HBA1C MFR BLD: 6.8 % (ref 4–5.6)
POTASSIUM SERPL-SCNC: 5 MMOL/L (ref 3.5–5.1)
PROT SERPL-MCNC: 7.2 G/DL (ref 6–8.4)
SODIUM SERPL-SCNC: 136 MMOL/L (ref 136–145)

## 2023-04-21 PROCEDURE — 99214 OFFICE O/P EST MOD 30 MIN: CPT | Mod: S$PBB,,, | Performed by: INTERNAL MEDICINE

## 2023-04-21 PROCEDURE — 99214 PR OFFICE/OUTPT VISIT, EST, LEVL IV, 30-39 MIN: ICD-10-PCS | Mod: S$PBB,,, | Performed by: INTERNAL MEDICINE

## 2023-04-21 PROCEDURE — 83036 HEMOGLOBIN GLYCOSYLATED A1C: CPT | Performed by: INTERNAL MEDICINE

## 2023-04-21 PROCEDURE — 80053 COMPREHEN METABOLIC PANEL: CPT | Performed by: INTERNAL MEDICINE

## 2023-04-21 PROCEDURE — 99999 PR PBB SHADOW E&M-EST. PATIENT-LVL IV: ICD-10-PCS | Mod: PBBFAC,,, | Performed by: INTERNAL MEDICINE

## 2023-04-21 PROCEDURE — 99214 OFFICE O/P EST MOD 30 MIN: CPT | Mod: PBBFAC,PO | Performed by: INTERNAL MEDICINE

## 2023-04-21 PROCEDURE — 99999 PR PBB SHADOW E&M-EST. PATIENT-LVL IV: CPT | Mod: PBBFAC,,, | Performed by: INTERNAL MEDICINE

## 2023-04-21 PROCEDURE — 36415 COLL VENOUS BLD VENIPUNCTURE: CPT | Mod: PO | Performed by: INTERNAL MEDICINE

## 2023-04-21 NOTE — PROGRESS NOTES
History of present illness:   63-year-old lady in today for general health review following up on a couple of chronic medical conditions.  The patient has hypertension, diabetes mellitus, dyslipidemia.  She reports taking medications as directed.  Currently feels generally well with no chest pain palpitations syncope cough shortness of breath or unexpected weight changes.    Current medications:  Medications are all noted and reviewed.      Review of systems:   General: no fever, chills, generalized body aches. No unexpected weight loss.  Eyes:  No visual disturbances.  HEENT:  No hoarseness, dysphagia, ear pain.  Respiratory:  No cough, no shortness of breath.  Cardiovascular: no chest pain, palpitations, cough, exertional limb pain. No edema.  GI: no nausea, vomiting.  No abdominal pain. No change in bowel habits.  No melena, no hematochezia.  : no dysuria. No change in the color or character of the urine. No urinary frequency.  Musculoskeletal: no joint pain or swelling.  Neurologic:  No focal neurological complaints.  No headaches.  Skin:  No rashes or other concerns.  Psych:  No new emotional issues    Health screenings:  Colonoscopy October 2018.    Mammogram March 2023.    Eye exam October 2022.    Declines pneumococcal vaccine update      Physical examination:   GENERAL:  Alert, appropriately groomed, no acute distress.  VS:  Blood pressure taken by this examiner 134/68.  EYES: sclerae white ,nonicteric. PERRL.  HEENT:  Normocephalic. Ear canals and tympanic membranes normal. Mouth and pharynx normal. No thyromegaly. Trachea midline and freely mobile.  LUNGS:  Clear to ascultation and normal to percussion.  CARDIOVASCULAR:  Normal heart sounds.  No significant murmur. Carotids full bilaterally without bruit.  Pedal pulses intact .  No abdominal bruit.  No peripheral extremity edema.  GI: the abdomen is obese, soft, no distension. No masses , tenderness, organomegaly.   LYMPHATIC:  No axillary, inguinal ,  cervical adenopathy.  MUSCULOSKELETAL:  Range of motion, stability and strength of the right and left upper and lower extremities normal. No swollen or tender joints  NEUROLOGIC:  DTR's normal. No gross motor or sensory deficits apparent, gait normal.  SKIN:  No rashes.   MS:  Alert, oriented , affect and mood all appropriate.  Diabetic foot exam:     Visual Inspection:  Normal -  Bilateral    Pedal Pulses:   Right: Present  Left: Present    Posterior Tibialis Pulses:   Right:Present  Left: Present           Impression:   [age] year lady with several stable chronic medical conditions.      Hypertension controlled.      Type 2 diabetes mellitus has been controlled due for update.      Dyslipidemia statin therapy.      Severe obesity with associated comorbidities 36.71.      Adenomatous colon polyps due for five year surveillance colonoscopy this year.      Previous tobacco use disorder        Plan:  Update complete metabolic profile, glycohemoglobin.    She declines pneumococcal vaccine.    She wants to defer colonoscopy at this time and revisit in six months.    Return clinic six months follow-up

## 2023-05-15 RX ORDER — ALPRAZOLAM 0.5 MG/1
TABLET ORAL
Qty: 100 TABLET | Refills: 1 | Status: SHIPPED | OUTPATIENT
Start: 2023-05-15 | End: 2023-06-14 | Stop reason: SDUPTHER

## 2023-05-16 ENCOUNTER — OFFICE VISIT (OUTPATIENT)
Dept: DERMATOLOGY | Facility: CLINIC | Age: 63
End: 2023-05-16
Payer: MEDICARE

## 2023-05-16 DIAGNOSIS — L71.8 ACNE ROSACEA, ERYTHEMATOUS TELANGIECTATIC TYPE: ICD-10-CM

## 2023-05-16 DIAGNOSIS — L71.8 OCULAR ROSACEA: ICD-10-CM

## 2023-05-16 DIAGNOSIS — L60.3 ONYCHODYSTROPHY: Primary | ICD-10-CM

## 2023-05-16 DIAGNOSIS — L82.0 INFLAMED SEBORRHEIC KERATOSIS: ICD-10-CM

## 2023-05-16 DIAGNOSIS — E11.69 TYPE 2 DIABETES MELLITUS WITH OTHER SPECIFIED COMPLICATION, WITHOUT LONG-TERM CURRENT USE OF INSULIN: ICD-10-CM

## 2023-05-16 PROCEDURE — 99214 OFFICE O/P EST MOD 30 MIN: CPT | Mod: 25,S$PBB,, | Performed by: DERMATOLOGY

## 2023-05-16 PROCEDURE — 17110 DESTRUCTION B9 LES UP TO 14: CPT | Mod: PBBFAC,PO | Performed by: DERMATOLOGY

## 2023-05-16 PROCEDURE — 99999 PR PBB SHADOW E&M-EST. PATIENT-LVL III: ICD-10-PCS | Mod: PBBFAC,,, | Performed by: DERMATOLOGY

## 2023-05-16 PROCEDURE — 17110 PR DESTRUCTION BENIGN LESIONS UP TO 14: ICD-10-PCS | Mod: S$PBB,,, | Performed by: DERMATOLOGY

## 2023-05-16 PROCEDURE — 99214 PR OFFICE/OUTPT VISIT, EST, LEVL IV, 30-39 MIN: ICD-10-PCS | Mod: 25,S$PBB,, | Performed by: DERMATOLOGY

## 2023-05-16 PROCEDURE — 17110 DESTRUCTION B9 LES UP TO 14: CPT | Mod: S$PBB,,, | Performed by: DERMATOLOGY

## 2023-05-16 PROCEDURE — 99999 PR PBB SHADOW E&M-EST. PATIENT-LVL III: CPT | Mod: PBBFAC,,, | Performed by: DERMATOLOGY

## 2023-05-16 PROCEDURE — 99213 OFFICE O/P EST LOW 20 MIN: CPT | Mod: PBBFAC,PO | Performed by: DERMATOLOGY

## 2023-05-16 RX ORDER — SITAGLIPTIN 25 MG/1
TABLET, FILM COATED ORAL
Qty: 30 TABLET | Refills: 11 | Status: SHIPPED | OUTPATIENT
Start: 2023-05-16 | End: 2023-12-28 | Stop reason: SDUPTHER

## 2023-05-16 RX ORDER — NYSTATIN 100000 U/G
CREAM TOPICAL
Qty: 30 G | Refills: 2 | Status: SHIPPED | OUTPATIENT
Start: 2023-05-16 | End: 2023-12-28

## 2023-05-16 NOTE — PROGRESS NOTES
Subjective:      Patient ID:  Carmen Hernandez is a 63 y.o. female who presents for No chief complaint on file.    Patient present for lesions and nail care. LOV: 9/2020 with Dr. Shine.     C/o Lesion to left cheek  Lesion to left lower eyelid  Lesion to left jaw line.    Fingernails lifting up,x 5 years, white in color and dry.     Derm Hx: h/o psoriasis on both ears, elbows treated with Lidex cream 0.05 %      Denies history of NMSC  Denies family history of melanoma    Past Medical History:  No date: Anxiety  No date: Bilateral dry eyes      Comment:  uses atears//  No date: Cataract  No date: Diabetes mellitus      Comment:  LBSL 128 today---fluctuates  No date: Diverticulosis  No date: GERD (gastroesophageal reflux disease)  No date: Hyperlipidemia  No date: Hypertension  No date: Irritable bowel syndrome  No date: Nephrolithiasis  No date: Severe obesity (BMI 35.0-39.9) with comorbidity    Diagnosis Skin, left dorsal forearm, shave biopsy:   -PSORIASIFORM KERATOSIS, see comment   COMMENT:  Some of the histological features are similar to individual lesions   of psoriasis. If there is no clinical history of psoriasis in this patient,   then this lesion can best classified as a psoriasiform keratosis.  Clinical   correlation is recommend     Review of Systems   Musculoskeletal:  Negative for joint swelling and arthralgias.   Skin:  Positive for itching, rash and dry skin. Negative for abscesses.   Hematologic/Lymphatic: Does not bruise/bleed easily.     Objective:   Physical Exam   Constitutional: She appears well-developed and well-nourished.   HENT:   Head:       Neurological: She is alert and oriented to person, place, and time.   Psychiatric: She has a normal mood and affect.   Skin:   Areas Examined (abnormalities noted in diagram):   Head / Face Inspection Performed  Neck Inspection Performed  LUE Inspection Performed  RLE Inspected  Nails and Digits Inspection Performed              Diagram  Legend     Erythematous scaling macule/papule c/w actinic keratosis       Vascular papule c/w angioma      Pigmented verrucoid papule/plaque c/w seborrheic keratosis      Yellow umbilicated papule c/w sebaceous hyperplasia      Irregularly shaped tan macule c/w lentigo     1-2 mm smooth white papules consistent with Milia      Movable subcutaneous cyst with punctum c/w epidermal inclusion cyst      Subcutaneous movable cyst c/w pilar cyst      Firm pink to brown papule c/w dermatofibroma      Pedunculated fleshy papule(s) c/w skin tag(s)      Evenly pigmented macule c/w junctional nevus     Mildly variegated pigmented, slightly irregular-bordered macule c/w mildly atypical nevus      Flesh colored to evenly pigmented papule c/w intradermal nevus       Pink pearly papule/plaque c/w basal cell carcinoma      Erythematous hyperkeratotic cursted plaque c/w SCC      Surgical scar with no sign of skin cancer recurrence      Open and closed comedones      Inflammatory papules and pustules      Verrucoid papule consistent consistent with wart     Erythematous eczematous patches and plaques     Dystrophic onycholytic nail with subungual debris c/w onychomycosis     Umbilicated papule    Erythematous-base heme-crusted tan verrucoid plaque consistent with inflamed seborrheic keratosis     Erythematous Silvery Scaling Plaque c/w Psoriasis     See annotation      Assessment / Plan:        Onychodystrophy  Lifting ? Yeast component vs psoriasis   Declines diflucan   -     nystatin (MYCOSTATIN) cream; Aaa nails bid  Dispense: 30 g; Refill: 2    Ocular rosacea  Managed by Optho  Taking oral abx    Inflamed seborrheic keratosis  Cryosurgery procedure note:    Verbal consent from the patient is obtained. Liquid nitrogen cryosurgery is applied to 2 lesions to produce a freeze injury. The patient is aware that blisters may form and is instructed on wound care with gentle cleansing and use of vaseline ointment to keep moist until  healed. The patient is supplied a handout on cryosurgery and is instructed to call if lesions do not completely resolve. Risk of dyspigmentation discussed.       Acne rosacea, erythematous telangiectatic type    Eucerin redness relief lotion  Has azelaic acid already   Not interested in lasers           No follow-ups on file.

## 2023-06-01 RX ORDER — METFORMIN HYDROCHLORIDE 500 MG/1
TABLET ORAL
Qty: 360 TABLET | Refills: 1 | Status: SHIPPED | OUTPATIENT
Start: 2023-06-01 | End: 2023-11-20

## 2023-06-02 DIAGNOSIS — I10 HTN (HYPERTENSION), BENIGN: ICD-10-CM

## 2023-06-02 RX ORDER — HYDROCHLOROTHIAZIDE 12.5 MG/1
TABLET ORAL
Qty: 90 TABLET | Refills: 3 | Status: SHIPPED | OUTPATIENT
Start: 2023-06-02

## 2023-06-03 DIAGNOSIS — I10 HTN (HYPERTENSION), BENIGN: ICD-10-CM

## 2023-06-05 RX ORDER — LISINOPRIL 40 MG/1
TABLET ORAL
Qty: 90 TABLET | Refills: 3 | OUTPATIENT
Start: 2023-06-05

## 2023-06-14 RX ORDER — ALPRAZOLAM 0.5 MG/1
TABLET ORAL
Qty: 100 TABLET | Refills: 1 | Status: SHIPPED | OUTPATIENT
Start: 2023-06-14 | End: 2023-08-04 | Stop reason: SDUPTHER

## 2023-06-14 NOTE — TELEPHONE ENCOUNTER
----- Message from Akanksha Hill sent at 6/14/2023  2:55 PM CDT -----  Contact: [phone]  Requesting an RX refill or new RX.  Is this a refill or new RX: Refill 1  RX name and strength (copy/paste from chart):  JANUVIA 25 mg Tab  Is this a 30 day or 90 day RX:   Pharmacy name and phone # (copy/paste from chart):  [id number] [place] [address] AT [employer] [address]   Phone:  [phone]  Fax:  [phone]        The doctors have asked that we provide their patients with the following 2 reminders -- prescription refills can take up to 72 hours, and a friendly reminder that in the future you can use your [facility] account to request refills:     Patient called, would like to know if Rx can be change from 30 days to 90 days. Please call and advise. Thank you

## 2023-06-21 DIAGNOSIS — H10.829 ROSACEA BLEPHAROCONJUNCTIVITIS: ICD-10-CM

## 2023-06-21 RX ORDER — DOXYCYCLINE 50 MG/1
CAPSULE ORAL
Qty: 30 CAPSULE | Refills: 3 | Status: SHIPPED | OUTPATIENT
Start: 2023-06-21 | End: 2023-10-24

## 2023-07-14 DIAGNOSIS — E11.9 TYPE 2 DIABETES MELLITUS WITHOUT COMPLICATION: ICD-10-CM

## 2023-08-04 RX ORDER — ALPRAZOLAM 0.5 MG/1
TABLET ORAL
Qty: 100 TABLET | Refills: 1 | Status: SHIPPED | OUTPATIENT
Start: 2023-08-04 | End: 2023-10-23 | Stop reason: SDUPTHER

## 2023-08-15 ENCOUNTER — LAB VISIT (OUTPATIENT)
Dept: LAB | Facility: HOSPITAL | Age: 63
End: 2023-08-15
Attending: INTERNAL MEDICINE
Payer: MEDICARE

## 2023-08-15 DIAGNOSIS — E11.9 TYPE 2 DIABETES MELLITUS WITHOUT COMPLICATION: ICD-10-CM

## 2023-08-15 LAB
CHOLEST SERPL-MCNC: 193 MG/DL (ref 120–199)
CHOLEST/HDLC SERPL: 3.7 {RATIO} (ref 2–5)
HDLC SERPL-MCNC: 52 MG/DL (ref 40–75)
HDLC SERPL: 26.9 % (ref 20–50)
LDLC SERPL CALC-MCNC: 107.6 MG/DL (ref 63–159)
NONHDLC SERPL-MCNC: 141 MG/DL
TRIGL SERPL-MCNC: 167 MG/DL (ref 30–150)

## 2023-08-15 PROCEDURE — 80061 LIPID PANEL: CPT | Performed by: INTERNAL MEDICINE

## 2023-08-15 PROCEDURE — 36415 COLL VENOUS BLD VENIPUNCTURE: CPT | Mod: PO | Performed by: INTERNAL MEDICINE

## 2023-09-21 ENCOUNTER — TELEPHONE (OUTPATIENT)
Dept: GASTROENTEROLOGY | Facility: CLINIC | Age: 63
End: 2023-09-21
Payer: MEDICARE

## 2023-09-21 NOTE — TELEPHONE ENCOUNTER
Returned call to the patient, patient states that she received a recall letter regarding her colonoscopy that is due, patient offered a date in November and stated that she would not have transportation in that month and will call back after the first of the year.

## 2023-09-21 NOTE — TELEPHONE ENCOUNTER
----- Message from Ryanne Mccain sent at 9/21/2023  4:28 PM CDT -----  Type:  Needs Medical Advice    Who Called: pt   Best Call Back Number: [phone]    Additional Information:  Requesting call back requesting clarification on a letter that was sent     please advise thank you

## 2023-10-03 ENCOUNTER — TELEPHONE (OUTPATIENT)
Dept: GASTROENTEROLOGY | Facility: CLINIC | Age: 63
End: 2023-10-03
Payer: MEDICARE

## 2023-10-03 NOTE — TELEPHONE ENCOUNTER
----- Message from Geraldine Santoyo LPN sent at 10/2/2023  3:24 PM CDT -----    ----- Message -----  From: Franklin Hickey  Sent: 10/2/2023   3:20 PM CDT  To: Tom CAICEDO Jr. Staff    Type: Needs Medical Advice  Who Called:  pt  Best Call Back Number: [phone]  Additional Information: pt is calling the office to schedule her colonoscopy. Please call back to advise Thanks!

## 2023-10-09 ENCOUNTER — TELEPHONE (OUTPATIENT)
Dept: GASTROENTEROLOGY | Facility: CLINIC | Age: 63
End: 2023-10-09
Payer: MEDICARE

## 2023-10-09 NOTE — TELEPHONE ENCOUNTER
----- Message from Nat Floyd sent at 10/9/2023  2:37 PM CDT -----  Regarding: return call  Contact: patient  Type:  Patient Returning Call    Who Called:  patient  Who Left Message for Patient:  Alexei  Does the patient know what this is regarding?:  schedule colonoscopy  Best Call Back Number:  [phone]  Additional Information:  Please call patient to advise.  Thanks!

## 2023-10-12 ENCOUNTER — OFFICE VISIT (OUTPATIENT)
Dept: OPTOMETRY | Facility: CLINIC | Age: 63
End: 2023-10-12
Payer: MEDICARE

## 2023-10-12 DIAGNOSIS — H52.203 MYOPIA WITH ASTIGMATISM AND PRESBYOPIA, BILATERAL: ICD-10-CM

## 2023-10-12 DIAGNOSIS — H25.813 COMBINED FORMS OF AGE-RELATED CATARACT, BILATERAL: ICD-10-CM

## 2023-10-12 DIAGNOSIS — H01.02B SQUAMOUS BLEPHARITIS OF UPPER AND LOWER EYELIDS OF BOTH EYES: ICD-10-CM

## 2023-10-12 DIAGNOSIS — H04.123 DRY EYE SYNDROME OF BILATERAL LACRIMAL GLANDS: ICD-10-CM

## 2023-10-12 DIAGNOSIS — E11.9 TYPE 2 DIABETES MELLITUS WITHOUT RETINOPATHY: Primary | ICD-10-CM

## 2023-10-12 DIAGNOSIS — H00.11 CHALAZION OF RIGHT UPPER EYELID: ICD-10-CM

## 2023-10-12 DIAGNOSIS — H52.13 MYOPIA WITH ASTIGMATISM AND PRESBYOPIA, BILATERAL: ICD-10-CM

## 2023-10-12 DIAGNOSIS — H01.02A SQUAMOUS BLEPHARITIS OF UPPER AND LOWER EYELIDS OF BOTH EYES: ICD-10-CM

## 2023-10-12 DIAGNOSIS — H52.4 MYOPIA WITH ASTIGMATISM AND PRESBYOPIA, BILATERAL: ICD-10-CM

## 2023-10-12 PROCEDURE — 99999 PR PBB SHADOW E&M-EST. PATIENT-LVL III: CPT | Mod: PBBFAC,,,

## 2023-10-12 PROCEDURE — 99213 OFFICE O/P EST LOW 20 MIN: CPT | Mod: PBBFAC,PO

## 2023-10-12 PROCEDURE — 92014 COMPRE OPH EXAM EST PT 1/>: CPT | Mod: S$PBB,,,

## 2023-10-12 PROCEDURE — 92015 DETERMINE REFRACTIVE STATE: CPT | Mod: ,,,

## 2023-10-12 PROCEDURE — 92014 PR EYE EXAM, EST PATIENT,COMPREHESV: ICD-10-PCS | Mod: S$PBB,,,

## 2023-10-12 PROCEDURE — 92015 PR REFRACTION: ICD-10-PCS | Mod: ,,,

## 2023-10-12 PROCEDURE — 99999 PR PBB SHADOW E&M-EST. PATIENT-LVL III: ICD-10-PCS | Mod: PBBFAC,,,

## 2023-10-13 NOTE — PROGRESS NOTES
HPI    Pt here for routine DM exam BENJAMIN 10/12/22    Pt has chalazion OD.  Pt stated floaters are same but no flashes. Pt not   taking eye drops but uses warm compress 2-3 times a week. DM and HTN   controlled w aid.     Pt uses lid scrubs, OcuSoft, no drops; pt takes 50mg Doxycycline.    Hemoglobin A1C       Date                     Value               Ref Range             Status                04/21/2023               6.8 (H)             4.0 - 5.6 %           Final                   10/24/2022               7.6 (H)             4.0 - 5.6 %           Final                   06/28/2022               8.1 (H)             4.0 - 5.6 %           Final            Last edited by Mika Weller, OD on 10/12/2023  3:24 PM.            Assessment /Plan     For exam results, see Encounter Report.    Type 2 diabetes mellitus without retinopathy    Combined forms of age-related cataract, bilateral    Chalazion of right upper eyelid    Squamous blepharitis of upper and lower eyelids of both eyes    Dry eye syndrome of bilateral lacrimal glands    Myopia with astigmatism and presbyopia, bilateral      No diabetic retinopathy or macular edema evident on today's exam OD, OS. Ed pt on importance of maintaining strict BS control due to potential for visual fluctuations and/or vision loss. Monitor with yearly dilated exam.    Mild, not yet visually significant. Surgery not recommended at this time. Ed pt on symptoms of worsening cataracts including reduced BCVA and glare. Monitor yearly for changes.    Longstanding per pt. Small chalazion of right upper eyelid, nasal. Pt largely asymptomatic, still does frequent warm compresses. Discussed option for surgical excision. Pt defers at this time. Monitor yearly for changes, sooner prn.     4-5. Chronic blepharitis and dry eye OU. Pt has mild rosacea of skin, ocular rosacea strongly suspected. Pt currently using OcuSoft lid scrubs QD. Recommended Cliradex or SterLid as an alternative. Pt has  tried tea tree oil in the past without success. Continue 50mg Doxycycline for maintenance. Monitor yearly for changes, sooner if symptoms worsen.    6. Discussed spectacle options with pt and released final spec rx. Ed pt on change in rx and adaptation.      RTC: 1 year for comprehensive exam or sooner prn

## 2023-10-20 RX ORDER — OMEPRAZOLE 40 MG/1
CAPSULE, DELAYED RELEASE ORAL
Qty: 90 CAPSULE | Refills: 3 | Status: SHIPPED | OUTPATIENT
Start: 2023-10-20

## 2023-10-23 RX ORDER — ALPRAZOLAM 0.5 MG/1
TABLET ORAL
Qty: 100 TABLET | Refills: 1 | Status: SHIPPED | OUTPATIENT
Start: 2023-10-23 | End: 2023-11-15 | Stop reason: SDUPTHER

## 2023-10-24 DIAGNOSIS — H10.829 ROSACEA BLEPHAROCONJUNCTIVITIS: ICD-10-CM

## 2023-10-24 RX ORDER — DOXYCYCLINE 50 MG/1
CAPSULE ORAL
Qty: 30 CAPSULE | Refills: 3 | Status: SHIPPED | OUTPATIENT
Start: 2023-10-24 | End: 2024-02-15

## 2023-11-08 DIAGNOSIS — E11.9 TYPE 2 DIABETES MELLITUS WITHOUT COMPLICATION: ICD-10-CM

## 2023-11-10 ENCOUNTER — PATIENT MESSAGE (OUTPATIENT)
Dept: INTERNAL MEDICINE | Facility: CLINIC | Age: 63
End: 2023-11-10
Payer: MEDICARE

## 2023-11-10 RX ORDER — PRAVASTATIN SODIUM 40 MG/1
TABLET ORAL
Qty: 90 TABLET | Refills: 3 | Status: SHIPPED | OUTPATIENT
Start: 2023-11-10

## 2023-11-15 RX ORDER — ALPRAZOLAM 0.5 MG/1
TABLET ORAL
Qty: 100 TABLET | Refills: 1 | Status: SHIPPED | OUTPATIENT
Start: 2023-11-15 | End: 2023-12-28 | Stop reason: SDUPTHER

## 2023-11-20 RX ORDER — METFORMIN HYDROCHLORIDE 500 MG/1
TABLET ORAL
Qty: 360 TABLET | Refills: 1 | Status: SHIPPED | OUTPATIENT
Start: 2023-11-20

## 2023-11-29 ENCOUNTER — TELEPHONE (OUTPATIENT)
Dept: GASTROENTEROLOGY | Facility: CLINIC | Age: 63
End: 2023-11-29
Payer: MEDICARE

## 2023-11-29 NOTE — TELEPHONE ENCOUNTER
Pt has not secured transportation for 12/12 yet. Pt wants to reschedule c-scope. The next available date won't be till 2/8. Pt will keep appt for 12/12.

## 2023-11-30 ENCOUNTER — TELEPHONE (OUTPATIENT)
Dept: GASTROENTEROLOGY | Facility: CLINIC | Age: 63
End: 2023-11-30
Payer: MEDICARE

## 2023-11-30 NOTE — TELEPHONE ENCOUNTER
----- Message from Christian Betancourt sent at 11/30/2023  1:21 PM CST -----  Name of Who is Calling:ADIS ROMEO [[id number]]        What is the request in detail:Pt called to cancel upcoming procedure on 12/12, will call back when ready to schedule. Please advise thank you      Can the clinic reply by [facility]:NO        [facility]Telephone Information:  Mobile          [phone]

## 2023-11-30 NOTE — TELEPHONE ENCOUNTER
Attempted to return call to the patient, left message on voicemail that her procedure was cancelled per her request, clinic number provided.

## 2023-12-04 ENCOUNTER — TELEPHONE (OUTPATIENT)
Dept: GASTROENTEROLOGY | Facility: CLINIC | Age: 63
End: 2023-12-04
Payer: MEDICARE

## 2023-12-04 NOTE — TELEPHONE ENCOUNTER
----- Message from Radha Baker sent at 12/4/2023  9:00 AM CST -----  Regarding: Appointment Reschedule Request  Contact: patient at [phone]  Type:  Appointment Reschedule Request    Name of Caller:  patient at [phone]    Additional Information:  calling to see if she can reschedule the colonoscopy that was canceled for 12/1. Please call and advise. Thank you

## 2023-12-04 NOTE — TELEPHONE ENCOUNTER
----- Message from Jared Clark sent at 12/4/2023  1:10 PM CST -----  Regarding: ret call  Contact: temi at [phone]  Type:  Patient Returning Call    Who Called:  Temi    Who Left Message for Patient:  Geraldine    Does the patient know what this is regarding?:  yes reschedule colonoscopy    Best Call Back Number:  [phone]    Additional Information:

## 2023-12-07 ENCOUNTER — TELEPHONE (OUTPATIENT)
Dept: OPTOMETRY | Facility: CLINIC | Age: 63
End: 2023-12-07
Payer: MEDICARE

## 2023-12-28 ENCOUNTER — OFFICE VISIT (OUTPATIENT)
Dept: INTERNAL MEDICINE | Facility: CLINIC | Age: 63
End: 2023-12-28
Payer: MEDICARE

## 2023-12-28 VITALS
HEIGHT: 64 IN | HEART RATE: 81 BPM | WEIGHT: 206.56 LBS | DIASTOLIC BLOOD PRESSURE: 82 MMHG | OXYGEN SATURATION: 94 % | RESPIRATION RATE: 18 BRPM | BODY MASS INDEX: 35.26 KG/M2 | SYSTOLIC BLOOD PRESSURE: 112 MMHG | TEMPERATURE: 98 F

## 2023-12-28 DIAGNOSIS — E11.69 TYPE 2 DIABETES MELLITUS WITH OTHER SPECIFIED COMPLICATION, WITHOUT LONG-TERM CURRENT USE OF INSULIN: ICD-10-CM

## 2023-12-28 DIAGNOSIS — I15.2 HYPERTENSION ASSOCIATED WITH DIABETES: ICD-10-CM

## 2023-12-28 DIAGNOSIS — E66.01 SEVERE OBESITY (BMI 35.0-39.9) WITH COMORBIDITY: ICD-10-CM

## 2023-12-28 DIAGNOSIS — F33.0 MILD EPISODE OF RECURRENT MAJOR DEPRESSIVE DISORDER: ICD-10-CM

## 2023-12-28 DIAGNOSIS — Z00.00 ENCOUNTER FOR ANNUAL HEALTH EXAMINATION: Primary | ICD-10-CM

## 2023-12-28 DIAGNOSIS — E11.59 HYPERTENSION ASSOCIATED WITH DIABETES: ICD-10-CM

## 2023-12-28 DIAGNOSIS — E78.5 DYSLIPIDEMIA: ICD-10-CM

## 2023-12-28 DIAGNOSIS — F41.9 ANXIETY: ICD-10-CM

## 2023-12-28 PROBLEM — E11.9 DIABETES MELLITUS WITHOUT COMPLICATION: Status: RESOLVED | Noted: 2018-05-23 | Resolved: 2023-12-28

## 2023-12-28 PROCEDURE — 99214 PR OFFICE/OUTPT VISIT, EST, LEVL IV, 30-39 MIN: ICD-10-PCS | Mod: S$PBB,,, | Performed by: NURSE PRACTITIONER

## 2023-12-28 PROCEDURE — 99999 PR PBB SHADOW E&M-EST. PATIENT-LVL IV: CPT | Mod: PBBFAC,,, | Performed by: NURSE PRACTITIONER

## 2023-12-28 PROCEDURE — 99214 OFFICE O/P EST MOD 30 MIN: CPT | Mod: S$PBB,,, | Performed by: NURSE PRACTITIONER

## 2023-12-28 PROCEDURE — 99999 PR PBB SHADOW E&M-EST. PATIENT-LVL IV: ICD-10-PCS | Mod: PBBFAC,,, | Performed by: NURSE PRACTITIONER

## 2023-12-28 PROCEDURE — 99214 OFFICE O/P EST MOD 30 MIN: CPT | Mod: PBBFAC,PO | Performed by: NURSE PRACTITIONER

## 2023-12-28 RX ORDER — ALPRAZOLAM 0.5 MG/1
TABLET ORAL
Qty: 100 TABLET | Refills: 1 | Status: SHIPPED | OUTPATIENT
Start: 2023-12-28 | End: 2024-01-08

## 2023-12-28 NOTE — PROGRESS NOTES
Subjective:       Patient ID: Carmen Hernandez is a 63 y.o. female.    Chief Complaint: Annual Exam    Patient is a 63 y.o. female who traditionally follows with SHAGUFTA Bentley MD presenting today for follow up on diabetes, hypertension and high cholesterol with no acute concerns.     Review of patient's allergies indicates:   Allergen Reactions    Demerol [meperidine] Hives    Iodinated contrast media Hives    Bactrim [sulfamethoxazole-trimethoprim] Other (See Comments)     Other reaction(s): blotchy skin    Ciprofloxacin Other (See Comments)     Other reaction(s): blotchy skin    Erythromycin Other (See Comments)     tachycardia       Medication List with Changes/Refills   Current Medications    ALCOHOL SWABS (BD ALCOHOL SWABS) PADM    USE TO CHECK BLOOD GLUCOSE TWICE DAILY    DOXYCYCLINE (MONODOX) 50 MG CAP    TAKE 1 CAPSULE BY MOUTH EVERY DAY    HYDROCHLOROTHIAZIDE (HYDRODIURIL) 12.5 MG TAB    TAKE 1 TABLET(12.5 MG) BY MOUTH EVERY DAY    LISINOPRIL (PRINIVIL,ZESTRIL) 40 MG TABLET    TAKE 1 TABLET(40 MG) BY MOUTH EVERY DAY    METFORMIN (GLUCOPHAGE) 500 MG TABLET    TAKE 2 TABLETS BY MOUTH TWICE DAILY    OMEPRAZOLE (PRILOSEC) 40 MG CAPSULE    TAKE 1 CAPSULE(40 MG) BY MOUTH EVERY DAY    ONETOUCH VERIO SYNC KIT    USE TO TEST BLOOD GLUOCSE TWICE DAILY    PRAVASTATIN (PRAVACHOL) 40 MG TABLET    TAKE 1 TABLET BY MOUTH EVERY DAY   Changed and/or Refilled Medications    Modified Medication Previous Medication    ALPRAZOLAM (XANAX) 0.5 MG TABLET ALPRAZolam (XANAX) 0.5 MG tablet       TAKE 1 TABLET BY MOUTH FOUR TIMES DAILY AS NEEDED    TAKE 1 TABLET BY MOUTH FOUR TIMES DAILY AS NEEDED    BLOOD SUGAR DIAGNOSTIC (ONETOUCH VERIO TEST STRIPS) STRP ONETOUCH VERIO TEST STRIPS Strp       1 strip by Other route 3 (three) times daily.    USE TO TEST BLOOD SUGAR 3 TO 4 TIMES DAILY    SITAGLIPTIN PHOSPHATE (JANUVIA) 25 MG TAB JANUVIA 25 mg Tab       Take 1 tablet (25 mg total) by mouth once daily.    TAKE 1 TABLET(25 MG)  "BY MOUTH EVERY DAY   Discontinued Medications    FLUOCINONIDE 0.05% (LIDEX) 0.05 % CREAM    Apply topically 2 (two) times daily.    NYSTATIN (MYCOSTATIN) CREAM    Aaa nails bid       Health Maintenance    MM2023  Colonoscopy: 10/31/2018  Declined Vaccinations   Hepatitis C Screen: 2007  Microalbumin: 2023     Patient ambulates on her own without an assistive device.   Does she have issues with balance, falls or walking?   No, but did fall this past year after misjudging a step.    We encourage you to start exercising if you do not already, continue at your current level or increase your level of activity.     Do you eat fruits and vegetable every day?  Yes    Do you go to the eye doctor regularly? Yes  When was your last exam:    Medical, social and surgical history has been reviewed with the patient.     Review of Systems   Constitutional:  Negative for chills and fever.   Respiratory:  Negative for cough and shortness of breath.    Cardiovascular:  Negative for chest pain.   Neurological:  Negative for dizziness and headaches.       Objective:   /82 (BP Location: Right arm, Patient Position: Sitting, BP Method: Large (Manual))   Pulse 81   Temp 97.6 °F (36.4 °C) (Temporal)   Resp 18   Ht 5' 4" (1.626 m)   Wt 93.7 kg (206 lb 9.1 oz)   SpO2 (!) 94%   BMI 35.46 kg/m²     Physical Exam  Vitals reviewed.   Constitutional:       Appearance: Normal appearance.   HENT:      Head: Normocephalic and atraumatic.   Cardiovascular:      Rate and Rhythm: Normal rate and regular rhythm.      Heart sounds: Normal heart sounds. No murmur heard.  Pulmonary:      Effort: Pulmonary effort is normal.      Breath sounds: Normal breath sounds. No wheezing.   Skin:     General: Skin is warm and dry.   Neurological:      Mental Status: She is alert and oriented to person, place, and time.       I reviewed and independently interpreted the labs and imaging below:  Last Labs:  Glucose   Date Value Ref Range " Status   04/21/2023 160 (H) 70 - 110 mg/dL Final   06/28/2022 202 (H) 70 - 110 mg/dL Final     BUN   Date Value Ref Range Status   04/21/2023 16 8 - 23 mg/dL Final   06/28/2022 13 8 - 23 mg/dL Final     Creatinine   Date Value Ref Range Status   04/21/2023 0.7 0.5 - 1.4 mg/dL Final   06/28/2022 0.8 0.5 - 1.4 mg/dL Final     Cholesterol   Date Value Ref Range Status   08/15/2023 193 120 - 199 mg/dL Final     Comment:     The National Cholesterol Education Program (NCEP) has set the  following guidelines (reference ranges) for Cholesterol:  Optimal.....................<200 mg/dL  Borderline High.............200-239 mg/dL  High........................> or = 240 mg/dL     06/28/2022 169 120 - 199 mg/dL Final     Comment:     The National Cholesterol Education Program (NCEP) has set the  following guidelines (reference ranges) for Cholesterol:  Optimal.....................<200 mg/dL  Borderline High.............200-239 mg/dL  High........................> or = 240 mg/dL       Hemoglobin A1C   Date Value Ref Range Status   04/21/2023 6.8 (H) 4.0 - 5.6 % Final     Comment:     ADA Screening Guidelines:  5.7-6.4%  Consistent with prediabetes  >or=6.5%  Consistent with diabetes    High levels of fetal hemoglobin interfere with the HbA1C  assay. Heterozygous hemoglobin variants (HbS, HgC, etc)do  not significantly interfere with this assay.   However, presence of multiple variants may affect accuracy.     10/24/2022 7.6 (H) 4.0 - 5.6 % Final     Comment:     ADA Screening Guidelines:  5.7-6.4%  Consistent with prediabetes  >or=6.5%  Consistent with diabetes    High levels of fetal hemoglobin interfere with the HbA1C  assay. Heterozygous hemoglobin variants (HbS, HgC, etc)do  not significantly interfere with this assay.   However, presence of multiple variants may affect accuracy.       Hemoglobin   Date Value Ref Range Status   05/23/2022 14.7 12.0 - 16.0 g/dL Final   06/25/2021 13.2 12.0 - 16.0 g/dL Final     Hematocrit    Date Value Ref Range Status   05/23/2022 44.6 37.0 - 48.5 % Final   06/25/2021 40.6 37.0 - 48.5 % Final       Assessment and Plan:     1. Encounter for annual health examination    2. Hypertension associated with diabetes    Chronic, stable, continue current medications    - CBC Auto Differential; Future  - TSH; Future    3. Type 2 diabetes mellitus with other specified complication, without long-term current use of insulin    Chronic, stable, continue current medications    - Comprehensive Metabolic Panel; Future  - Hemoglobin A1C; Future  - Microalbumin/Creatinine Ratio, Urine; Future  - blood sugar diagnostic (ONETOUCH VERIO TEST STRIPS) Strp; 1 strip by Other route 3 (three) times daily.  Dispense: 200 strip; Refill: 11  - SITagliptin phosphate (JANUVIA) 25 MG Tab; Take 1 tablet (25 mg total) by mouth once daily.  Dispense: 90 tablet; Refill: 3    4. Dyslipidemia    Chronic, stable, continue current medication    5. Anxiety  6. Mild episode of recurrent major depressive disorder    Chronic, stable, continue current medication    - ALPRAZolam (XANAX) 0.5 MG tablet; TAKE 1 TABLET BY MOUTH FOUR TIMES DAILY AS NEEDED  Dispense: 100 tablet; Refill: 1    7. Severe obesity (BMI 35.0-39.9) with comorbidity    Chronic, stable

## 2024-01-03 ENCOUNTER — TELEPHONE (OUTPATIENT)
Dept: GASTROENTEROLOGY | Facility: CLINIC | Age: 64
End: 2024-01-03
Payer: MEDICARE

## 2024-01-03 NOTE — TELEPHONE ENCOUNTER
----- Message from Radha Baker sent at 1/3/2024  3:18 PM CST -----  Regarding: Needs Medical Advice  Contact: patient at [phone]  Type: Needs Medical Advice  Who Called:  patient at [phone]    Additional Information: patient would like to discuss procedure. She might need to cancel. Please call and advise. Thank you

## 2024-01-03 NOTE — TELEPHONE ENCOUNTER
Returned call to the patient , patient was asking if a protocol was in place for people to not get ill from having procedures.  Patient advised that the surgery center takes precautions all day long to have their procedure rooms clean and secure for patients while procedures are being performed.  Patient states that she was just making sure as she is nervous to have her procedure as her  has problems with blood clots in his lungs and she would like to keep him safe while they are in the building.  She was given the option to have her  wear a mask if her chooses or if he would like , he could wait in their vehicle and be called when she is completed.  Patient states she will tell her  his option and will choose how they would like to proceed.

## 2024-01-08 DIAGNOSIS — F41.9 ANXIETY: ICD-10-CM

## 2024-01-08 RX ORDER — ALPRAZOLAM 0.5 MG/1
TABLET ORAL
Qty: 100 TABLET | Refills: 1 | Status: SHIPPED | OUTPATIENT
Start: 2024-01-08

## 2024-01-16 RX ORDER — CHOLECALCIFEROL (VITAMIN D3) 25 MCG
1000 TABLET ORAL DAILY
COMMUNITY

## 2024-01-17 ENCOUNTER — ANESTHESIA EVENT (OUTPATIENT)
Dept: ENDOSCOPY | Facility: HOSPITAL | Age: 64
End: 2024-01-17
Payer: MEDICARE

## 2024-01-18 ENCOUNTER — ANESTHESIA (OUTPATIENT)
Dept: ENDOSCOPY | Facility: HOSPITAL | Age: 64
End: 2024-01-18
Payer: MEDICARE

## 2024-01-18 ENCOUNTER — HOSPITAL ENCOUNTER (OUTPATIENT)
Facility: HOSPITAL | Age: 64
Discharge: HOME | End: 2024-01-18
Attending: INTERNAL MEDICINE | Admitting: INTERNAL MEDICINE
Payer: MEDICARE

## 2024-01-18 VITALS
BODY MASS INDEX: 35.17 KG/M2 | SYSTOLIC BLOOD PRESSURE: 154 MMHG | HEIGHT: 64 IN | RESPIRATION RATE: 16 BRPM | TEMPERATURE: 98 F | WEIGHT: 206 LBS | HEART RATE: 85 BPM | OXYGEN SATURATION: 99 % | DIASTOLIC BLOOD PRESSURE: 70 MMHG

## 2024-01-18 DIAGNOSIS — Z86.010 HX OF COLONIC POLYPS: ICD-10-CM

## 2024-01-18 PROBLEM — Z86.0100 HISTORY OF COLON POLYPS: Status: ACTIVE | Noted: 2024-01-18

## 2024-01-18 LAB — GLUCOSE SERPL-MCNC: 226 MG/DL (ref 70–110)

## 2024-01-18 PROCEDURE — G0105 COLORECTAL SCRN; HI RISK IND: HCPCS | Mod: PO | Performed by: INTERNAL MEDICINE

## 2024-01-18 PROCEDURE — 63600175 PHARM REV CODE 636 W HCPCS: Mod: PO | Performed by: INTERNAL MEDICINE

## 2024-01-18 PROCEDURE — 37000009 HC ANESTHESIA EA ADD 15 MINS: Mod: PO | Performed by: INTERNAL MEDICINE

## 2024-01-18 PROCEDURE — 37000008 HC ANESTHESIA 1ST 15 MINUTES: Mod: PO | Performed by: INTERNAL MEDICINE

## 2024-01-18 PROCEDURE — D9220A PRA ANESTHESIA: Mod: ANES,,, | Performed by: ANESTHESIOLOGY

## 2024-01-18 PROCEDURE — D9220A PRA ANESTHESIA: Mod: CRNA,,, | Performed by: NURSE ANESTHETIST, CERTIFIED REGISTERED

## 2024-01-18 PROCEDURE — G0105 COLORECTAL SCRN; HI RISK IND: HCPCS | Mod: ,,, | Performed by: INTERNAL MEDICINE

## 2024-01-18 PROCEDURE — 63600175 PHARM REV CODE 636 W HCPCS: Mod: PO | Performed by: NURSE ANESTHETIST, CERTIFIED REGISTERED

## 2024-01-18 PROCEDURE — 25000003 PHARM REV CODE 250: Mod: PO | Performed by: NURSE ANESTHETIST, CERTIFIED REGISTERED

## 2024-01-18 RX ORDER — PROPOFOL 10 MG/ML
VIAL (ML) INTRAVENOUS CONTINUOUS PRN
Status: DISCONTINUED | OUTPATIENT
Start: 2024-01-18 | End: 2024-01-18

## 2024-01-18 RX ORDER — SODIUM CHLORIDE, SODIUM LACTATE, POTASSIUM CHLORIDE, CALCIUM CHLORIDE 600; 310; 30; 20 MG/100ML; MG/100ML; MG/100ML; MG/100ML
INJECTION, SOLUTION INTRAVENOUS CONTINUOUS
Status: DISCONTINUED | OUTPATIENT
Start: 2024-01-18 | End: 2024-01-18 | Stop reason: HOSPADM

## 2024-01-18 RX ORDER — SODIUM CHLORIDE 0.9 % (FLUSH) 0.9 %
10 SYRINGE (ML) INJECTION
Status: DISCONTINUED | OUTPATIENT
Start: 2024-01-18 | End: 2024-01-18 | Stop reason: HOSPADM

## 2024-01-18 RX ORDER — LIDOCAINE HYDROCHLORIDE 20 MG/ML
INJECTION INTRAVENOUS
Status: DISCONTINUED | OUTPATIENT
Start: 2024-01-18 | End: 2024-01-18

## 2024-01-18 RX ORDER — PROPOFOL 10 MG/ML
VIAL (ML) INTRAVENOUS
Status: DISCONTINUED | OUTPATIENT
Start: 2024-01-18 | End: 2024-01-18

## 2024-01-18 RX ADMIN — SODIUM CHLORIDE, SODIUM LACTATE, POTASSIUM CHLORIDE, AND CALCIUM CHLORIDE: .6; .31; .03; .02 INJECTION, SOLUTION INTRAVENOUS at 09:01

## 2024-01-18 RX ADMIN — PROPOFOL 100 MCG/KG/MIN: 10 INJECTION, EMULSION INTRAVENOUS at 09:01

## 2024-01-18 RX ADMIN — LIDOCAINE HYDROCHLORIDE 100 MG: 20 INJECTION INTRAVENOUS at 09:01

## 2024-01-18 RX ADMIN — PROPOFOL 100 MG: 10 INJECTION, EMULSION INTRAVENOUS at 09:01

## 2024-01-18 NOTE — ANESTHESIA POSTPROCEDURE EVALUATION
Anesthesia Post Evaluation    Patient: Carmen Hernandez    Procedure(s) Performed: Procedure(s) (LRB):  COLONOSCOPY (N/A)    Final Anesthesia Type: general      Patient location during evaluation: PACU  Patient participation: Yes- Able to Participate  Level of consciousness: awake and alert  Post-procedure vital signs: reviewed and stable  Pain management: adequate  Airway patency: patent    PONV status at discharge: No PONV  Anesthetic complications: no      Cardiovascular status: hemodynamically stable  Respiratory status: unassisted and room air  Hydration status: euvolemic  Follow-up not needed.              Vitals Value Taken Time   /68 01/18/24 1001   Temp 36.4 °C (97.6 °F) 01/18/24 1001   Pulse 95 01/18/24 1001   Resp 16 01/18/24 1001   SpO2 100   01/18/24 1018         No case tracking events are documented in the log.      Pain/Darshana Score: Darshana Score: 9 (1/18/2024 10:01 AM)

## 2024-01-18 NOTE — ANESTHESIA PREPROCEDURE EVALUATION
01/18/2024  Carmen Hernandez is a 63 y.o., female.      Pre-op Assessment    I have reviewed the Patient Summary Reports.     I have reviewed the Nursing Notes. I have reviewed the NPO Status.   I have reviewed the Medications.     Review of Systems  Anesthesia Hx:  No problems with previous Anesthesia                Social:  Former Smoker       Cardiovascular:     Hypertension, well controlled           hyperlipidemia                       Hypertension         Pulmonary:  Pulmonary Normal                       Renal/:  Chronic Renal Disease renal calculi       Kidney Function/Disease             Hepatic/GI:  Bowel Prep.   GERD, well controlled   IBS     Gerd          Neurological:  Neurology Normal                                      Endocrine:  Diabetes, well controlled, type 2    Diabetes                    Obesity / BMI > 30, Morbid Obesity / BMI > 40  Psych:  Psychiatric History anxiety  Daily xanax                  Physical Exam  General: Well nourished, Cooperative, Alert and Oriented    Airway:  Mallampati: II   Mouth Opening: Normal  TM Distance: Normal  Neck ROM: Normal ROM    Dental:  Intact    Chest/Lungs:  Normal Respiratory Rate    Heart:  Rate: Normal        Anesthesia Plan  Type of Anesthesia, risks & benefits discussed:    Anesthesia Type: Gen Natural Airway  Intra-op Monitoring Plan: Standard ASA Monitors  Induction:  IV  Informed Consent: Informed consent signed with the Patient and all parties understand the risks and agree with anesthesia plan.  All questions answered.   ASA Score: 3    Ready For Surgery From Anesthesia Perspective.     .       CHIEF COMPLAINT    Chief Complaint   Patient presents with   • Cough       HPI    Isa Renteria is a 64 year old female with complicated medical history including COPD, class 3 severe obesity, chronic diastolic heart failure with preserved ejection fraction, T2 DM with stage 3 chronic kidney disease and hypertension, depression, iron deficiency anemia, osteoarthritis, pulmonary HTN, paroxysmal atrial fibrillation on Eliquis, HUNTER who presents today with chief complaint of cough and congestion ongoing for 3 weeks.  Feels like she wants to cough something up but is not able to.  She denies any chest pain or shortness of breath.  She is taking her Eliquis as prescribed.  She is on 4 L of oxygen at night and on 3 L of oxygen p.r.n. during the day.  She feels short of breath with exertion but only when she is not wearing her oxygen during the day.  She continues to smoke cigarettes.  She knows that this is not going to be helpful her symptoms.  She states that Ana coming up she went to be evaluated as the duration of her cough was frustrating for her.  Denies hemoptysis.  Denies central chest pain radiating to back or jaw or arm.  No fevers.  Has not taken any medication for her symptoms.     Allergies    ALLERGIES:   Allergen Reactions   • Allergy Other (See Comments)     COCKROACH    Allergic rhinitis     • Cat Dander Other (See Comments)     Allergic rhinitis     • Dog Dander Other (See Comments)     Allergic rhinitis     • Dust Other (See Comments)     itchy eyes, stuffy nose   • Trees Other (See Comments)     Allergic rhinitis         Current Medications   Discharge Medication List as of 12/15/2022 11:28 AM      Prior to Admission Medications    Details   diclofenac (VOLTAREN) 1 % gel APPLY 4 GRAMS ON LOWER BODY JOINTS AND 2 GRAMS ON UPPER BODY JOINTS EVERY 6 HOURS SPARINGLY AS NEEDED.Eprescribe, Disp-300 g, R-0Osteoarthritis:   - Lower extremity (eg, knee): Apply 4 g to each affected area up to 4 times daily; max  dose per joint: 16  g/day    - Upper extremity (eg, hand): Apply 2 g to each affected area up to 4 times daily; max dose per joint: 8 g/day   - Max total body dose (all combined joints): 32 g/day     Pain, acute (eg, musculoskeletal):    - (off-label use) Apply up to 4 g t o each affected area up to 4 times daily; max dose per area: 16 g/day   - Max total body dose (all combined areas): 32 g/day.    - NOTE: Dosing is based on general recommendations from  labeling.            gabapentin (NEURONTIN) 100 MG capsule Take 2 capsules by mouth in the morning and 2 capsules at noon and 2 capsules in the evening.Eprescribe, Disp-180 capsule, R-2      HYDROcodone-acetaminophen (NORCO) 5-325 MG per tablet Take 1 tablet by mouth every 6 hours as needed for Pain. Do not start before November 28, 2022.Eprescribe, Disp-120 tablet, R-0Rx must last 30 days!      tiZANidine (ZANAFLEX) 4 MG tablet Take 1 tablet by mouth every 8 hours as needed (muscle spasms).Historical Med, Disp-30 tablet, R-0      atorvastatin (LIPITOR) 20 MG tablet Take 1 tablet by mouth daily.Eprescribe, Disp-90 tablet, R-3      furosemide (LASIX) 40 MG tablet Take 1 tablet by mouth 2 times daily. Dose increase since July 29, 2022Eprescribe, Disp-180 tablet, R-3      carvedilol (COREG) 12.5 MG tablet TAKE 2 TABLETS BY MOUTH ONCE DAILY IN THE MORNING AND 1 IN THE EVENINGEprescribe, Disp-270 tablet, R-3      potassium CHLORIDE (KLOR-CON M) 20 MEQ toshia ER tablet TAKE 1 TABLET BY MOUTH TWICE DAILY ON  MONDAY  AND  FRIDAY  AND  ONE  ONCE  DAILY  FOR  THE  REST  OF  THE  WEEKEprescribe, Disp-135 tablet, R-3Dose decreased 1/10/22      dofetilide (TIKOSYN) 500 MCG capsule TAKE 1 CAPSULE BY MOUTH EVERY 12 HOURSEprescribe, Disp-180 capsule, R-1      bromfenac (Prolensa) 0.07 % ophthalmic solution 1 drop in the surgical eye daily .Start 3 days before surgery and bring to surgeryEprescribe, Disp-3 mL, R-0      prednisoLONE acetate (PRED FORTE) 1 % ophthalmic  suspension Place 1 drop into left eye in the morning and 1 drop at noon and 1 drop in the evening. Start drops AFTER surgery and continue until gone.Eprescribe, Disp-5 mL, R-0      insulin glargine (Lantus SoloStar) 100 UNIT/ML pen-injector INJECT 38 UNITS SUBCUTANEOUSLY ONCE DAILY IN THE MORNING AND 20 IN THE EVENING AND  PRIME  WITH  2  UNITS  PRIOR  TO  EACH  DOSEEprescribe, Disp-45 mL, R-4      Insulin Pen Needle (ReliOn Pen Needles) 31G X 6 MM Misc Use 1 needle to inject insulin 5 times daily for DM.Disp-450 each, R-3, Eprescribe      traZODone (DESYREL) 50 MG tablet Take 0.5 tablets by mouth nightly.Eprescribe, Disp-45 tablet, R-1      lisinopril (ZESTRIL) 40 MG tablet Take 1 tablet by mouth daily.Eprescribe, Disp-90 tablet, R-3      Symbicort 160-4.5 MCG/ACT inhaler Inhale 2 puffs into the lungs in the morning and 2 puffs in the evening.Eprescribe, Disp-33 g, R-12, DAWPlease dispense 90 day supply      ezetimibe (ZETIA) 10 MG tablet Take 1 tablet by mouth daily.Eprescribe, Disp-90 tablet, R-3      Continuous Blood Gluc Sensor (FreeStyle Sonia 14 Day Sensor) Misc 1 each every 14 days.Eprescribe, Disp-2 each, R-11      buPROPion XL (WELLBUTRIN XL) 150 MG 24 hr tablet Take 1 tablet by mouth once dailyEprescribe, Disp-90 tablet, R-1      Jentadueto 2.5-1000 MG per tablet TAKE 1 TABLET BY MOUTH TWICE DAILY WITH MEALSEprescribe, Disp-180 tablet, R-1      amLODIPine (NORVASC) 2.5 MG tablet TAKE 1 TABLET BY MOUTH ONCE DAILY AT BEDTIMEEprescribe, Disp-90 tablet, R-3      Spiriva HandiHaler 18 MCG capsule for inhaler Inhale 1 puff by mouth once dailyEprescribe, Disp-90 capsule, R-3      DULoxetine (CYMBALTA) 60 MG capsule Take 1 capsule by mouth once dailyEprescribe, Disp-30 capsule, R-11      albuterol (VENTOLIN) (2.5 MG/3ML) 0.083% nebulizer solution Take 3 mLs by nebulization every 6 hours as needed for Wheezing.Eprescribe, Nebulization, EVERY 6 HOURS PRN, Disp-375 mL, R-11Each vial = 3 mL (2.5 mg). Each box = 25  vials (75 mL).      Enter dispense quantity of 75 mL per box.     375 mL = QS for 30  days with Q6H dosing.      ProAir  (90 Base) MCG/ACT inhaler Inhale 2 puffs into the lungs every 4 hours as needed for Shortness of Breath or Wheezing.Eprescribe, Disp-1 each, R-5, HIMANSHU      apixaBAN (Eliquis) 5 MG Tab Take 1 tablet by mouth every 12 hours.Eprescribe, Disp-180 tablet, R-3      Insulin Lispro, 1 Unit Dial, (HumaLOG KwikPen) 100 UNIT/ML pen-injector Prime 2 units before each dose. Inject into the skin 4 units plus sliding scale before each meal. If blood sugar is <150 =4 units,151-200= 6 units; 201-250= 8 units; 251-300=10 units; 301-350=12 untis; 351-400= 14 units; >400 call MDEprescribe, Disp-15  mL, R-5Cancel prescription for novolog as not covered by insurance.Prime 2 units before each dose. This is for insulin pen.      ferrous sulfate 325 (65 FE) MG EC tablet Take 2 tablets by mouth every other day.OTCPatient will purchase OTC.      Continuous Blood Gluc  (FreeStyle Sonia 14 Day [place]) Device 1 each daily.Eprescribe, Disp-1 Device, R-0      guaiFENesin (Mucinex) 600 MG 12 hr tablet Take 2 tablets by mouth 2 times daily. To help loosen mucousEprescribe, Disp-120 tablet, R-5      cyanocobalamin (VITAMIN B-12) 100 MCG tablet Take 100 mcg by mouth 1 day a week. Historical Med      blood glucose (ONETOUCH VERIO) test strip Test blood sugar 2 times daily as directed. Diagnosis: Type 2 DM Meter: One touch VeroDisp-120 each,R-12, Eprescribe      Lancets (ONETOUCH DELICA PLUS CITODN04F) Misc 1 Device by Other route 2 times daily. Use to check blood glucose twice daily for DM. DX Code: E11.65Eprescribe, Disp-100 each,R-10Please consider 90 day supplies to promote better adherence      fluticasone (FLONASE) 50 MCG/ACT nasal spray Spray 2 sprays in each nostril daily.Eprescribe, Disp-16 g,R-5Please consider 90 day supplies to promote better adherence      MAGNESIUM OXIDE PO Take 400 mg by mouth daily.  Indications: Disorder with Low Magnesium Levels Historical Med      Oxygen 20-80 % Kit Spray 2-4 L/min in each nostril continuous.Historical Med      folic acid (FOLATE) 1 MG tablet Take 1 mg by mouth daily. Historical Med      acetaminophen (TYLENOL) 500 MG tablet Take 1,000 mg by mouth in the morning and 1,000 mg in the evening.Historical Med      Multiple Minerals-Vitamins (CALCIUM-MAGNESIUM-ZINC-D3) Tab Take 2 tablets by mouth nightly. Historical Med      DISPENSE Glucometer kit of patient's choice. Test blood sugar twice daily. DX: DM type 2, on insulin. (250.00)Eprescribe, Disp-1 kit, R-0Wants glucometer kit which will be covered by her insurance. Please call patient when ready for .      Omega-3 Fatty Acids (FISH OIL) 1000 MG capsule Take 1 g by mouth 2 times daily. Historical Med      fexofenadine (ALLEGRA) 180 MG tablet Take 180 mg by mouth daily.Historical Med      Cholecalciferol (VITAMIN D) 1000 UNITS capsule Take 2,000 Units by mouth daily. Historical Med             Past Medical History    Past Medical History:   Diagnosis Date   • Acute respiratory failure with hypoxia and hypercapnia (CMS/HCC) 09/23/2018   • Adenoma of right adrenal gland    • Anemia    • Anxiety    • Arthritis    • Breast cancer (CMS/HCC) 2007    right   • Chronic bronchitis (CMS/HCC)    • Chronic kidney disease    • Chronic pain    • COPD (chronic obstructive pulmonary disease) (CMS/HCC)    • Depression    • DM2 (diabetes mellitus, type 2) (CMS/HCC)     uncontrolled due to noncompliance with medications   • Family history of breast cancer in mother    • HTN (hypertension)    • Hyperlipidemia    • Hyperlipidemia    • Latex allergy    • Morbid obesity (CMS/HCC)     BMI: 40   • Multiple allergies    • OA (osteoarthritis)     hands, hips   • OA (osteoarthritis) of knee    • Papillary carcinoma (CMS/HCC)    • Personal history of radiation therapy    • Polycythemia secondary to smoking 04/2015    work-up negative for polycythemia  vera   • Sinusitis, chronic    • Sleep apnea     Bipap   • Thigh abscess    • Tobacco dependence    • Trigger finger of right thumb 05/20/2019   • Urinary incontinence    • Venous insufficiency of both lower extremities     greater saphenous veins bilateral   • Vitamin D deficiency        Surgical History    Past Surgical History:   Procedure Laterality Date   • Bladder surgery     • Breast biopsy  11/14/2007    percutaneous   • Breast surgery procedure unlisted  01/14/2008    mastotomy   • Carpal tunnel release Left 06/15/2018    TCTR   • Carpal tunnel release Right 08/31/2018    TCTR   • Cataract extraction extracapsular w/ intraocular lens implantation Left    • Cataract extraction w/ intraocular lens implant Right 01/22/2020    Dr. Kyleigh MD   • Cervical fusion  12/16/2002    anterior technique   • Cervical fusion  06/30/2003    anterior technique, removal of implanted device from other   • Cholecystectomy     • Colonoscopy diagnostic  09/2021    repeat in 5 years.   • Colonoscopy w/ polypectomy  01/17/2011   • Colonoscopy w/ polypectomy  06/08/2016    repeat in 5 years 1 polyp, due June 2021   • Cyst removal      pilonidal   • Endometrial biopsy  02/24/2009   • Esophagogastroduodenoscopy transoral flex w/bx single or mult  09/2021   • Excise breast lesion  11/30/2007    needle localization   • Hysterectomy  03/11/2009   • Incision and drainage      and debridement right thigh abscess   • Knee scope,diagnostic  12/02/2008    Knee Arthroscopy, meniscectomy, chondroplasty   • Mastectomy partial Right 12/12/2007    right DCIS   • Oophorectomy     • Total vaginal hysterectomy  03/11/2009   • Tubal ligation      bilateral       Social History    Social History     Tobacco Use   • Smoking status: Every Day     Packs/day: 1.00     Years: 37.00     Pack years: 37.00     Types: Cigarettes     Start date: 1/1/1974   • Smokeless tobacco: Never   • Tobacco comments:     increased to 1 pack per day   Vaping Use   • Vaping  Use: never used   Substance Use Topics   • Alcohol use: Not Currently     Comment: not interested in quit information   • Drug use: No       Family History    Family History   Problem Relation Age of Onset   • Cancer, Breast Mother 55   • Cancer, Lung Mother    • Cataracts Mother    • Macular degeneration Mother    • Heart Father         MI   • Diabetes Father    • Hypertension Father    • Diabetes Sister    • Obesity Sister    • Coronary Artery Disease Brother         s/p 4v CABG   • Diabetes Brother    • Obesity Brother         400 lbs   • Diabetes Maternal Grandmother        REVIEW OF SYSTEMS    ALL 13 SYSTEMS REVIEWED AND NEGATIVE OR NONCONTRIBUTORY UNLESS OTHERWISE NOTED IN HPI      PHYSICAL EXAM     ED Triage Vitals [12/15/22 0920]   ED Triage Vitals Group      Temp 98.3 °F (36.8 °C)      Heart Rate 78      Resp 16      BP (!) 167/75      SpO2 (S) (!) 88 %      EtCO2 mmHg       Height 5' 6\" (1.676 m)      Weight 297 lb 6.4 oz (134.9 kg)      Weight Scale Used Standing scale      BMI (Calculated) 48      IBW/kg (Calculated) 59.3       Gen:   Well developed, well nourished, AAOx3 in no acute distress  Head:  Normocephalic, without abnormal findings  HEENT:   PERRL, EOMI without Nystagmus. No scleral icterus   Nose:  Clear without blood or purulent mucous   Mouth: Patent without swelling.  Moist well perfused mucous membranes.  No oropharyngeal exudates, no post oropharyngeal edema or erythema, no lesions  Neck: Supple, Normal R.O.M., No masses, thyromegaly, posterior tenderness or meningeal signs  Resp: Expiratory wheezing.  94%-95% oxygenation on 3 L nasal cannula.  No rhonchi or rales.  CVS: RRR without significant murmur, rub or gallop; normal S1 and S2  Ext:  No clubbing, cyanosis, or significant edema. Equal UE/LE pulses. No joint swelling or erythema  Skin:  Well perfused, no significant rashes. No jaundice  Neuro: No focal Neuro deficits with equal UE/LE strength and sensation.  Lymph:No significant  Lymphadenopathy  Psych:Alert and interactive with normal affect and interaction.      Procedures      Labs  Results for orders placed or performed during the hospital encounter of 12/15/22   COVID/Flu/RSV panel   Result Value    Rapid SARS-COV-2 by PCR Not Detected    Influenza A by PCR Not Detected    Influenza B by PCR Not Detected    RSV BY PCR Not Detected    Isolation Guidelines      Comment: Do not use this test result as the sole decision-maker for discontinuation of isolation.   Clinical evaluation should be considered for other respiratory illness requiring transmission-based isolation.    -    No fever (<99.0 F/37.2 C) for at least 24 hours without the use of fever-reducing medications    AND  -    Respiratory symptoms have improved or resolved (e.g. cough, shortness of breath)     AND  -    COVID-19 negative test    See COVID-19 Deisolation Resource Guide    Procedural Comment      Comment: SARS-COV-2 nucleic acid has not been detected indicating the absence of COVID-19.    This test was performed using the [employer] Xpert Xpress SARS-CoV-2/Flu/RSV RT-PCR test that has been given Emergency Use Authorization (EUA) by the [place] Food and Drug Administration (FDA).  These tests are considered definitive and do not need to be confirmed by another method.         Imaging  X Ray of chest was done in ED today.  Results reviewed by myself and interpreted as negative for pneumothorax, pleural effusion, lung mass, lobar consolidation. Final results pending radiologist review.      Imaging Results          XR Chest PA and Lateral (Final result)  Result time 12/15/22 10:46:23    Final result                 Impression:    IMPRESSION:  No acute cardiopulmonary findings.               Narrative:    Chest 2 View(s)    HISTORY:   COPD, cough x 3 weeks     COMPARISON:  4/14/2022      FINDINGS:    Support lines and tubes:  None.    Heart and mediastinum:  The cardiac silhouette remains enlarged and stable  in size.      Pulmonary vessels:  Normal.    Lungs:  Stable right diaphragm elevation.    Pleural effusion(s):  None.    Pneumothorax:  None.    Other:  N/A.                                       Medications   ipratropium-albuterol (DUONEB) 0.5-2.5 (3) MG/3ML nebulizer solution 3 mL (3 mLs Nebulization Given 12/15/22 1017)         MDM:  Patient with chief complaint and exam findings as above.  Treated with DuoNeb in emergency department.  Feels about the same thereafter.  She is on her regular amount of daytime oxygen at home though she states that she has not been wearing her oxygen regularly as she should be.  Suspect that she may have to wear oxygen more often than just p.r.n. and states that she really only feels short of breath when she is not wearing her oxygen during the day.  No x-ray evidence of acute cardiopulmonary disease per my initial interpretation radiologist in agreement.  Suspect bronchitis.  No evidence pneumonia, pneumothorax, pleural effusion.  Will treat with prednisone over the course of the next 5 days as well as Tessalon Perles, doxycycline.  Azithromycin held given possible pronation QT affect with dull the tied.  She is agreeable to this plan.  May follow-up with primary care provider.  Negative for flu, COVID, RSV.    Diagnosis:  ED Diagnosis     Diagnosis Comment Associated Orders       Final diagnosis    Bronchitis -- --            Discharge Medication List as of 12/15/2022 11:28 AM      New Prescriptions    Details   benzonatate (TESSALON PERLES) 200 MG capsule Take 1 capsule by mouth 3 times daily as needed for Cough.Eprescribe, Disp-30 capsule, R-0      doxycycline hyclate (VIBRA-TABS) 100 MG tablet Take 1 tablet by mouth in the morning and 1 tablet in the evening. Do all this for 7 days.Eprescribe, Disp-14 tablet, R-0               Follow Up:  Melanie Medina MD  [facility] [address] [phone]           Patient was instructed to return to the ED immediately if  symptoms worsen or any new unusual symptoms arise.      Recheck on patient. I Discussed with patient ED findings and plan for discharge. Patient was given ED warnings, discharge instructions, and follow up information to go home with. Patient understands and agrees with plan for discharge. All questions answered and concerns addressed.     Closure:  The patient understands that this is a provisional diagnosis. Provisional diagnosis can and do change. The diagnosis that you are discharged with today is based on the symptoms with which you presented today. If any new symptoms occur or worsen, you should seek immediate attention for re-evaluation.  Any symptoms that persist or fail to completely resolve require further evaluation by your other healthcare provider(s).       Mayank Medrano PA-C  12/15/22 [id number]

## 2024-01-18 NOTE — H&P
History & Physical - Short Stay  Gastroenterology      SUBJECTIVE:     Procedure: Colonoscopy    Chief Complaint/Indication for Procedure: Surveillance, Hx of colon polyps    History of Present Illness:  Asymptomatic    See recent PCP note:  Office Visit   2023  [facility] - Internal Medicine       Saundra Camacho NP  Internal Medicine Encounter for annual health examination +6 more  Dx Annual Exam  Reason for Visit     Progress Notes    Saundra Camacho NP at 2023 10:30 AM    Status: Signed   Expand All Collapse All  Subjective:         Subjective   Patient ID: Carmen Hernandez is a 63 y.o. female.     Chief Complaint: Annual Exam     Patient is a 63 y.o. female who traditionally follows with SHAGUFTA Bentley MD presenting today for follow up on diabetes, hypertension and high cholesterol with no acute concerns.     Health Maintenance     MM2023  Colonoscopy: 10/31/2018  Declined Vaccinations   Hepatitis C Screen: 2007  Microalbumin: 2023      Patient ambulates on her own without an assistive device.   Does she have issues with balance, falls or walking?   No, but did fall this past year after misjudging a step.     We encourage you to start exercising if you do not already, continue at your current level or increase your level of activity.      Do you eat fruits and vegetable every day?  Yes     Do you go to the eye doctor regularly? Yes  When was your last exam:     Medical, social and surgical history has been reviewed with the patient.      Assessment and Plan:      1. Encounter for annual health examination     2. Hypertension associated with diabetes     Chronic, stable, continue current medications     - CBC Auto Differential; Future  - TSH; Future     3. Type 2 diabetes mellitus with other specified complication, without long-term current use of insulin     Chronic, stable, continue current medications     - Comprehensive Metabolic Panel; Future  -  Hemoglobin A1C; Future  - Microalbumin/Creatinine Ratio, Urine; Future  - blood sugar diagnostic (ONETOUCH VERIO TEST STRIPS) Strp; 1 strip by Other route 3 (three) times daily.  Dispense: 200 strip; Refill: 11  - SITagliptin phosphate (JANUVIA) 25 MG Tab; Take 1 tablet (25 mg total) by mouth once daily.  Dispense: 90 tablet; Refill: 3     4. Dyslipidemia     Chronic, stable, continue current medication     5. Anxiety  6. Mild episode of recurrent major depressive disorder     Chronic, stable, continue current medication     - ALPRAZolam (XANAX) 0.5 MG tablet; TAKE 1 TABLET BY MOUTH FOUR TIMES DAILY AS NEEDED  Dispense: 100 tablet; Refill: 1     7. Severe obesity (BMI 35.0-39.9) with comorbidity     Chronic, stable            See last Colonoscopy 10/31/2018   Indications:        Change in bowel habits, Diarrhea. Last colonoscopy:                        May 16, 2013, Personal history of colonic polyps,   Comorbidities       Hypertension, Morbid obesity, Type 2 diabetes mellitus,        Hyperlipidemia   Providers:           Jr Santos MD   Impression:          - One 2 mm polyp in the distal transverse colon,                        removed with a cold snare. Resected and retrieved.                        - Diverticulosis in the sigmoid colon.                        - Mild colonic spasm consistent with irritable bowel                        syndrome.                        - Non-bleeding internal hemorrhoids.                        - Redundant colon.                        - The examination was otherwise normal.                        - The examined portion of the ileum was normal.   Recommendation:      - Discharge patient to home.                        - Await pathology and culture results.                        - Repeat colonoscopy in 5 years for surveillance.                        - High fiber diet.                        - Use fiber, for example Citrucel, Fibercon, Konsyl                        or  Metamucil.                        - Take a PROBIOTIC, such as a carton of GREEK YOGURT                        (Chobani or Oikos, or Activia or Dannon); or tablets                        of ALIGN or CULTURELLE or ESPERANZA-Q (all                        non-prescription), every day for a month.                        - Miralax 1 capful (17 grams) in 8 ounces of water                        PO daily PRN.                        - Call the G.I. clinic in 2 weeks for reports (if                        you haven't heard from us sooner) [phone].                        - Continue present medications.                        - Return to normal activities tomorrow.   Jr Santos MD   10/31/2018     SPECIMEN   1) Transverse colon polyp.   2) Random cecum right colon.   3) Random rectosigmoid.   FINAL PATHOLOGIC DIAGNOSIS   1. TUBULAR ADENOMA   2., 3. FRAGMENTS OF NONNEOPLASTIC COLONIC MUCOSA WITH NO ACTIVE INFLAMMATION, EVIDENCE MICROSCOPIC COLITIS OR DYSPLASIA IDENTIFIED.      PTA Medications   Medication Sig    ALPRAZolam (XANAX) 0.5 MG tablet TAKE 1 TABLET BY MOUTH FOUR TIMES DAILY AS NEEDED    doxycycline (MONODOX) 50 MG Cap TAKE 1 CAPSULE BY MOUTH EVERY DAY    hydroCHLOROthiazide (HYDRODIURIL) 12.5 MG Tab TAKE 1 TABLET(12.5 MG) BY MOUTH EVERY DAY    lisinopriL (PRINIVIL,ZESTRIL) 40 MG tablet TAKE 1 TABLET(40 MG) BY MOUTH EVERY DAY    metFORMIN (GLUCOPHAGE) 500 MG tablet TAKE 2 TABLETS BY MOUTH TWICE DAILY    omeprazole (PRILOSEC) 40 MG capsule TAKE 1 CAPSULE(40 MG) BY MOUTH EVERY DAY    pravastatin (PRAVACHOL) 40 MG tablet TAKE 1 TABLET BY MOUTH EVERY DAY    SITagliptin phosphate (JANUVIA) 25 MG Tab Take 1 tablet (25 mg total) by mouth once daily.    vitamin D (VITAMIN D3) 1000 units Tab Take 1,000 Units by mouth once daily.    alcohol swabs (BD ALCOHOL SWABS) PadM USE TO CHECK BLOOD GLUCOSE TWICE DAILY    blood sugar diagnostic (ONETOUCH VERIO TEST STRIPS) Strp 1 strip by Other route 3 (three) times daily.     ONETOUCH VERIO SYNC kit USE TO TEST BLOOD GLUOCSE TWICE DAILY       Review of patient's allergies indicates:   Allergen Reactions    Demerol [meperidine] Hives    Iodinated contrast media Hives    Bactrim [sulfamethoxazole-trimethoprim] Other (See Comments)     Other reaction(s): blotchy skin    Ciprofloxacin Other (See Comments)     Other reaction(s): blotchy skin    Erythromycin Other (See Comments)     tachycardia        Past Medical History:   Diagnosis Date    Anxiety     Bilateral dry eyes     uses atears//    Cataract     Diabetes mellitus     LBSL 128 today---fluctuates    Diverticulosis     GERD (gastroesophageal reflux disease)     Hyperlipidemia     Hypertension     Irritable bowel syndrome     Nephrolithiasis     Severe obesity (BMI 35.0-39.9) with comorbidity      Past Surgical History:   Procedure Laterality Date    COLONOSCOPY  05/16/2013    PAM.   Diverticulosis in the sigmoid colon.  Tortuous colon.  Irritable bowel syndrome?.  Internal hemorrhoids.  Redundant colon. repeat in 5-6 years    COLONOSCOPY N/A 10/31/2018    Procedure: COLONOSCOPY;  Surgeon: Jr Santos Jr., MD;  Location: [facility];  Service: Endoscopy;  Laterality: N/A;    COLONOSCOPY W/ POLYPECTOMY  5/13/2009  Rohan SPENCER.   One 4 mm polyp in the transverse colon.  HYPERPLASTIC POLYP.    Diverticulosis sigmoid colon.    Tortuous colon.   Granularity hepatic flexure.     CYSTOSCOPY W/ STONE MANIPULATION      ENDOMETRIAL BIOPSY  X2    ESOPHAGOGASTRODUODENOSCOPY N/A 3/14/2023    Procedure: EGD (ESOPHAGOGASTRODUODENOSCOPY);  Surgeon: Jr Santos Jr., MD;  Location: [facility] ENDO;  Service: Endoscopy;  Laterality: N/A;    HYSTERECTOMY  2013    OOPHORECTOMY  2013    UPPER GASTROINTESTINAL ENDOSCOPY  09/26/2017    Dr. Santos     Family History   Problem Relation Age of Onset    Cataracts Mother     Heart disease Mother     Hypertension Mother     Diabetes Father     Hypertension Father     Diabetes Brother     Hypertension  "Brother     Macular degeneration Neg Hx     Retinal detachment Neg Hx     Strabismus Neg Hx     Stroke Neg Hx     Thyroid disease Neg Hx     Glaucoma Neg Hx     Cancer Neg Hx     Amblyopia Neg Hx     Blindness Neg Hx     Breast cancer Neg Hx     Colon cancer Neg Hx     Colon polyps Neg Hx     Crohn's disease Neg Hx     Ulcerative colitis Neg Hx     Stomach cancer Neg Hx     Esophageal cancer Neg Hx     Celiac disease Neg Hx      Social History     Tobacco Use    Smoking status: Former     Current packs/day: 0.00     Types: Cigarettes     Quit date: 8/12/2013     Years since quitting: 10.4    Smokeless tobacco: Never   Substance Use Topics    Alcohol use: No    Drug use: No         OBJECTIVE:     Vital Signs (Most Recent)  Temp: 97.9 °F (36.6 °C) (01/18/24 0904)  Pulse: 110 (01/18/24 0904)  Resp: 16 (01/18/24 0904)  BP: 114/65 (01/18/24 0904)  SpO2: 97 % (01/18/24 0904)    Physical Exam:  : Ht: 5' 4" (162.6 cm)   Wt: 93.4 kg (206 lb)   BMI: 35.36 kg/m² .                                                       GENERAL:  Comfortable, in no acute distress.                                 HEENT EXAM:  Nonicteric.  No adenopathy.  Oropharynx is clear.               NECK:  Supple.                                                               LUNGS:  Clear.                                                               CARDIAC:  Regular rate and rhythm.  S1, S2.  No murmur.                      ABDOMEN:  Obese.  Soft, positive bowel sounds, nontender.  No hepatosplenomegaly or masses.  No rebound or guarding.                                             EXTREMITIES:  No edema.     MENTAL STATUS:  Alert and oriented.    ASSESSMENT/PLAN:     Assessment: Surveillance, Hx of colon polyps    Plan: Colonoscopy    Anesthesia Plan:   MAC / General Anaesthesia    ASA Grade: ASA 2 - Patient with mild systemic disease with no functional limitations    MALLAMPATI SCORE: II (hard and soft palate, upper portion of tonsils anduvula " visible)

## 2024-01-18 NOTE — [employer]ATION PATIENT INSTRUCTIONS
Discharge Summary/Instructions for after Colonoscopy without   Biopsy/Polypectomy  [facility]    Thursday, January 18, 2024  Jr Santos MD  RESTRICTIONS ON ACTIVITY:  - Do not drive a car or operate machinery until the day after the procedure.      - The following day: return to full activity including work.  - For  3 days: No heavy lifting, straining or running.  - Diet: You may eat solid foods, but no gassy foods (i.e. beans, broccoli,   cabbage, etc).  TREATMENT FOR COMMON SIDE EFFECTS:  - Mild abdominal pain and bloating or excessive gas: rest, eat lightly and   use a heating pad.  SYMPTOMS TO WATCH FOR AND REPORT TO YOUR PHYSICIAN:  1. Severe abdominal pain.  2. Fever within 24 hours after a procedure.  3. A large amount of rectal bleeding. (A small amount of blood from the   rectum is not serious, especially if hemorrhoids are present.  3.  Because air was put into your colon during the procedure, expelling   large amounts of air from your rectum is normal.  4.  You may not have a bowel movement for 1-3 days because of the   colonoscopy prep.  This is normal.  5.  Call immediately if you notice any of the following:   Chills and/or fever over 101   Persistent vomiting   Severe abdominal pain, other than gas cramps   Severe chest pain   Black, tarry stools   Any bleeding - exceeding one tablespoon  Your doctor recommends these additional instructions:  Eat a high fiber diet and eat a diabetic (ADA) diet.   Take a fiber supplement, for example Citrucel, Fibercon, Konsyl or   Metamucil.   Take a PROBIOTIC, such as a carton of GREEK YOGURT (Chobani or Oikos,  or   Activia or Dannon);  or tablets of ALIGN or CULTURELLE or ESPERANZA-Q (all   non-prescription), every day for a month.   And repeat this at least 5-6   times a year.  Take Miralax 1 capful (17 grams) in 8 ounces of water by mouth once a day as   needed.   Your physician has recommended a repeat colonoscopy in 6-7 years for    surveillance.  None  If you have any questions or problems, please call your physician.  EMERGENCY PHONE NUMBER: [phone]  LAB RESULTS: Call in two (2) weeks for lab results, [phone]  ___________________________________________  Nurse Signature  ___________________________________________  Patient/Designated Responsible Party Signature  Jr Santos MD  1/18/2024 10:22:29 AM  This report has been verified and signed electronically.  Dear patient,  As a result of recent federal legislation (The Federal Cures Act), you may   receive lab or pathology results from your procedure in your [facility]   account before your physician is able to contact you. Your physician or   their representative will relay the results to you with their   recommendations at their soonest availability.  Thank you.  [employer]

## 2024-01-18 NOTE — TRANSFER OF CARE
"Anesthesia Transfer of Care Note    Patient: Carmen Hernandez    Procedure(s) Performed: Procedure(s) (LRB):  COLONOSCOPY (N/A)    Patient location: PACU    Anesthesia Type: general    Transport from OR: Transported from OR on room air with adequate spontaneous ventilation    Post pain: adequate analgesia    Post assessment: no apparent anesthetic complications and tolerated procedure well    Post vital signs: stable    Level of consciousness: lethargic and responds to stimulation    Nausea/Vomiting: no nausea/vomiting    Complications: none    Transfer of care protocol was followed      Last vitals: Visit Vitals  /65 (BP Location: Right arm, Patient Position: Lying)   Pulse 110   Temp 36.6 °C (97.9 °F) (Skin)   Resp 16   Ht 5' 4" (1.626 m)   Wt 93.4 kg (206 lb)   SpO2 97%   BMI 35.36 kg/m²     "

## 2024-01-18 NOTE — DISCHARGE INSTRUCTIONS
Recovery After Procedural Sedation (Adult)   You have been given medicine by vein to make you sleep during your surgery. This may have included both a pain medicine and sleeping medicine. Most of the effects have worn off. But you may still have some drowsiness for the next 6 to 8 hours.  Home care  Follow these guidelines when you get home:  For the next 8 hours, you should be watched by a responsible adult. This person should make sure your condition is not getting worse.  Don't drink any alcohol for the next 24 hours.  Don't drive, operate dangerous machinery, or make important business or personal decisions during the next 24 hours.  To prevent injury or falls, use caution when standing and walking for at least 24 hours after your procedure.  Note: Your healthcare provider may tell you not to take any medicine by mouth for pain or sleep in the next 4 hours. These medicines may react with the medicines you were given in the hospital. This could cause a much stronger response than usual.  Follow-up care  Follow up with your healthcare provider if you are not alert and back to your usual level of activity within 12 hours.  When to seek medical advice  Call your healthcare provider right away if any of these occur:  Drowsiness gets worse  Weakness or dizziness gets worse  Repeated vomiting  You can't be awakened  Fever  New rash  [employer] last reviewed this educational content on 9/1/2019  © 2851-4113 [employer]. [address] [place] [address], [place] [address]. All rights reserved. This information is not intended as a substitute for professional medical care. Always follow your healthcare professional's instructions       Tips to Control Acid Reflux    To control acid reflux, youll need to make some basic diet and lifestyle changes. The simple steps outlined below may be all youll need to ease discomfort.  Watch what you eat  Avoid fatty foods and spicy foods.  Eat fewer acidic foods, such as citrus and  tomato-based foods. These can increase symptoms.  Limit drinking alcohol, caffeine, and fizzy beverages. All increase acid reflux.  Try limiting chocolate, peppermint, and spearmint. These can worsen acid reflux in some people.  Watch when you eat  Avoid lying down for 3 hours after eating.  Do not snack before going to bed.  Raise your head  Raising your head and upper body by 4 to 6 inches helps limit reflux when youre lying down. Put blocks under the head of your bed frame to raise it.  Other changes  Lose weight, if you need to  Dont exercise near bedtime  Avoid tight-fitting clothes  Limit aspirin and ibuprofen  Stop smoking   Date Last Reviewed: 7/1/2016  © 9140-6678 [employer]. [address] [place] [address], [place] [address]. All rights reserved. This information is not intended as a substitute for professional medical care. Always follow your healthcare professional's instructions.         High-Fiber Diet  Fiber is in fruits, vegetables, cereals, and grains. Fiber passes through your body undigested. A high-fiber diet helps food move through your intestinal tract. The added bulk is helpful in preventing constipation. In people with diverticulosis, fiber helps clean out the pouches along the colon wall. It also prevents new pouches from forming. A high-fiber diet reduces the risk of colon cancer. It also lowers blood cholesterol and prevents high blood sugar in people with diabetes.    The fiber-rich foods listed below should be part of your diet. If you are not used to high-fiber foods, start with 1 or 2 foods from this list. Every 3 to 4 days add a new one to your diet. Do this until you are eating 4 high-fiber foods per day. This should give you 20 to 35 grams of fiber a day. It is also important to drink a lot of water when you are on this diet. You should have 6 to 8 glasses of water a day. Water makes the fiber swell and increases the benefit.  Foods high in dietary fiber  The following foods  are high in dietary fiber:  Breads. Breads made with 100% whole-wheat flour; nico, wheat, or rye crackers; whole-grain tortillas, bran muffins.  Cereals. Whole-grain and bran cereals with bran (shredded wheat, wheat flakes, raisin bran, corn bran); oatmeal, rolled oats, granola, and brown rice.  Fruits. Fresh fruits and their edible skins (pears, prunes, raisins, berries, apples, and apricots); bananas, citrus fruit, mangoes, pineapple; and prune juice.  Nuts. Any nuts and seeds.  Vegetables. Best served raw or lightly cooked. All types, especially: green peas, celery, eggplant, potatoes, spinach, broccoli, [place] sprouts, winter squash, carrots, cauliflower, soybeans, lentils, and fresh and dried beans of all kinds.  Other. Popcorn, any spices.  Date Last Reviewed: 8/1/2016  © 2654-0121 [employer]. [address] [place] [address]. All rights reserved. This information is not intended as a substitute for professional medical care. Always follow your healthcare professional's instructions.

## 2024-01-28 DIAGNOSIS — E11.69 TYPE 2 DIABETES MELLITUS WITH OTHER SPECIFIED COMPLICATION, WITHOUT LONG-TERM CURRENT USE OF INSULIN: ICD-10-CM

## 2024-01-29 RX ORDER — BLOOD-GLUCOSE METER
EACH MISCELLANEOUS
Qty: 200 STRIP | Refills: 11 | Status: SHIPPED | OUTPATIENT
Start: 2024-01-29

## 2024-01-29 NOTE — TELEPHONE ENCOUNTER
Refill Routing Note   Medication(s) are not appropriate for processing by [facility] for the following reason(s):        Non-participating provider    [facility] action(s):  Route               Appointments  past 12m or future 3m with PCP    Date Provider   Last Visit   4/21/2023 SHAGUFTA Bentley MD   Next Visit   Visit date not found SHAGUFTA Bentley MD   ED visits in past 90 days: 0        Note composed:9:15 PM 01/28/2024

## 2024-01-30 DIAGNOSIS — I10 HTN (HYPERTENSION), BENIGN: ICD-10-CM

## 2024-01-30 RX ORDER — LISINOPRIL 40 MG/1
TABLET ORAL
Qty: 90 TABLET | Refills: 3 | Status: SHIPPED | OUTPATIENT
Start: 2024-01-30

## 2024-02-15 DIAGNOSIS — H10.829 ROSACEA BLEPHAROCONJUNCTIVITIS: ICD-10-CM

## 2024-02-15 RX ORDER — DOXYCYCLINE 50 MG/1
CAPSULE ORAL
Qty: 30 CAPSULE | Refills: 3 | Status: SHIPPED | OUTPATIENT
Start: 2024-02-15 | End: 2024-06-10

## 2024-02-28 ENCOUNTER — TELEPHONE (OUTPATIENT)
Dept: OBSTETRICS AND GYNECOLOGY | Facility: CLINIC | Age: 64
End: 2024-02-28
Payer: MEDICARE

## 2024-02-28 DIAGNOSIS — Z12.31 ENCOUNTER FOR SCREENING MAMMOGRAM FOR BREAST CANCER: Primary | ICD-10-CM

## 2024-02-28 NOTE — TELEPHONE ENCOUNTER
----- Message from Yas Hassell sent at 2/28/2024  2:09 PM CST -----  Contact: self  Type:  Mammogram    Caller is requesting to schedule their annual mammogram appointment.  Order is not listed in EPIC.  Please enter order and contact patient to schedule.  Name of Caller:self  Where would they like the mammogram performed? [facility]  Would the patient rather a call back or a response via [facility]? call  Best Call Back Number:There are no phone numbers on file.    Additional Information: sts she received a letter in the mail stating she needs to claudio.please let pt know when it is in. please advise and thank you.

## 2024-03-14 ENCOUNTER — HOSPITAL ENCOUNTER (OUTPATIENT)
Dept: RADIOLOGY | Facility: HOSPITAL | Age: 64
Discharge: HOME | End: 2024-03-14
Attending: OBSTETRICS & GYNECOLOGY
Payer: MEDICARE

## 2024-03-14 VITALS — WEIGHT: 206 LBS | BODY MASS INDEX: 35.17 KG/M2 | HEIGHT: 64 IN

## 2024-03-14 DIAGNOSIS — Z12.31 ENCOUNTER FOR SCREENING MAMMOGRAM FOR BREAST CANCER: ICD-10-CM

## 2024-03-14 PROCEDURE — 77067 SCR MAMMO BI INCL CAD: CPT | Mod: TC,PO

## 2024-03-14 PROCEDURE — 77063 BREAST TOMOSYNTHESIS BI: CPT | Mod: 26,,, | Performed by: RADIOLOGY

## 2024-03-14 PROCEDURE — 77067 SCR MAMMO BI INCL CAD: CPT | Mod: 26,,, | Performed by: RADIOLOGY

## 2024-03-26 DIAGNOSIS — Z00.00 ENCOUNTER FOR MEDICARE ANNUAL WELLNESS EXAM: ICD-10-CM

## 2024-03-27 DIAGNOSIS — Z78.0 MENOPAUSE: ICD-10-CM

## 2024-05-10 DIAGNOSIS — F41.9 ANXIETY: ICD-10-CM

## 2024-05-10 RX ORDER — ALPRAZOLAM 0.5 MG/1
TABLET ORAL
Qty: 100 TABLET | Refills: 1 | OUTPATIENT
Start: 2024-05-10

## 2024-05-10 RX ORDER — ALPRAZOLAM 0.5 MG/1
TABLET ORAL
Qty: 100 TABLET | Refills: 0 | Status: SHIPPED | OUTPATIENT
Start: 2024-05-10 | End: 2024-06-05 | Stop reason: SDUPTHER

## 2024-05-17 RX ORDER — METFORMIN HYDROCHLORIDE 500 MG/1
1000 TABLET ORAL 2 TIMES DAILY
Qty: 360 TABLET | Refills: 1 | OUTPATIENT
Start: 2024-05-17

## 2024-05-17 NOTE — TELEPHONE ENCOUNTER
Discharge teaching and instructions completed with patient. AVS and prescriptions reviewed. Patient verbalized understanding follow up and care planning. IV and telemetry removed. Patient last see by you for an annual on 12/28/23  Was a former patient of Dr Bentley.  Has an upcoming appt to est carte with a new physician on 8/16/24    Can you send refill to last until that visit?

## 2024-05-20 RX ORDER — METFORMIN HYDROCHLORIDE 500 MG/1
1000 TABLET ORAL 2 TIMES DAILY
Qty: 360 TABLET | Refills: 0 | Status: SHIPPED | OUTPATIENT
Start: 2024-05-20 | End: 2024-06-05 | Stop reason: SDUPTHER

## 2024-05-23 ENCOUNTER — OFFICE VISIT (OUTPATIENT)
Dept: PRIMARY CARE CLINIC | Facility: CLINIC | Age: 64
End: 2024-05-23
Payer: MEDICARE

## 2024-05-23 ENCOUNTER — LAB VISIT (OUTPATIENT)
Dept: LAB | Facility: HOSPITAL | Age: 64
End: 2024-05-23
Attending: NURSE PRACTITIONER
Payer: MEDICARE

## 2024-05-23 VITALS
WEIGHT: 213.19 LBS | RESPIRATION RATE: 18 BRPM | SYSTOLIC BLOOD PRESSURE: 118 MMHG | BODY MASS INDEX: 36.4 KG/M2 | HEIGHT: 64 IN | HEART RATE: 95 BPM | OXYGEN SATURATION: 96 % | DIASTOLIC BLOOD PRESSURE: 62 MMHG

## 2024-05-23 DIAGNOSIS — E11.9 TYPE 2 DIABETES MELLITUS WITHOUT COMPLICATION, WITHOUT LONG-TERM CURRENT USE OF INSULIN: Primary | ICD-10-CM

## 2024-05-23 DIAGNOSIS — I10 HTN (HYPERTENSION), BENIGN: ICD-10-CM

## 2024-05-23 DIAGNOSIS — Z79.899 ENCOUNTER FOR LONG-TERM (CURRENT) USE OF MEDICATIONS: ICD-10-CM

## 2024-05-23 DIAGNOSIS — E11.9 TYPE 2 DIABETES MELLITUS WITHOUT COMPLICATION, WITHOUT LONG-TERM CURRENT USE OF INSULIN: ICD-10-CM

## 2024-05-23 LAB
ESTIMATED AVG GLUCOSE: 183 MG/DL (ref 68–131)
FOLATE SERPL-MCNC: 11.3 NG/ML (ref 4–24)
HBA1C MFR BLD: 8 % (ref 4–5.6)
VIT B12 SERPL-MCNC: 263 PG/ML (ref 210–950)

## 2024-05-23 PROCEDURE — 83036 HEMOGLOBIN GLYCOSYLATED A1C: CPT | Performed by: NURSE PRACTITIONER

## 2024-05-23 PROCEDURE — 82746 ASSAY OF FOLIC ACID SERUM: CPT | Performed by: NURSE PRACTITIONER

## 2024-05-23 PROCEDURE — 99214 OFFICE O/P EST MOD 30 MIN: CPT | Mod: S$PBB,,, | Performed by: NURSE PRACTITIONER

## 2024-05-23 PROCEDURE — 99213 OFFICE O/P EST LOW 20 MIN: CPT | Mod: PBBFAC | Performed by: NURSE PRACTITIONER

## 2024-05-23 PROCEDURE — 36415 COLL VENOUS BLD VENIPUNCTURE: CPT | Performed by: NURSE PRACTITIONER

## 2024-05-23 PROCEDURE — 82607 VITAMIN B-12: CPT | Performed by: NURSE PRACTITIONER

## 2024-05-23 PROCEDURE — 99999 PR PBB SHADOW E&M-EST. PATIENT-LVL III: CPT | Mod: PBBFAC,,, | Performed by: NURSE PRACTITIONER

## 2024-05-23 RX ORDER — HYDROCHLOROTHIAZIDE 12.5 MG/1
12.5 TABLET ORAL DAILY
Qty: 90 TABLET | Refills: 3 | Status: SHIPPED | OUTPATIENT
Start: 2024-05-23

## 2024-05-23 NOTE — PROGRESS NOTES
Subjective:       Patient ID: Carmen Hernandez is a 64 y.o. female.    Chief Complaint: Follow-up and Medication Refill    Patient is a 64 y.o. female who traditionally follows with Dorian Godinez MD presenting today for follow up on diabetes and refill of medications.     Review of patient's allergies indicates:   Allergen Reactions    Demerol [meperidine] Hives    Iodinated contrast media Hives    Bactrim [sulfamethoxazole-trimethoprim] Other (See Comments)     Other reaction(s): blotchy skin    Ciprofloxacin Other (See Comments)     Other reaction(s): blotchy skin    Erythromycin Other (See Comments)     tachycardia       Medication List with Changes/Refills   Current Medications    ALCOHOL SWABS (BD ALCOHOL SWABS) PADM    USE TO CHECK BLOOD GLUCOSE TWICE DAILY    ALPRAZOLAM (XANAX) 0.5 MG TABLET    TAKE 1 TABLET BY MOUTH FOUR TIMES DAILY AS NEEDED    DOXYCYCLINE (MONODOX) 50 MG CAP    TAKE 1 CAPSULE BY MOUTH EVERY DAY    LISINOPRIL (PRINIVIL,ZESTRIL) 40 MG TABLET    TAKE 1 TABLET(40 MG) BY MOUTH EVERY DAY    METFORMIN (GLUCOPHAGE) 500 MG TABLET    Take 2 tablets (1,000 mg total) by mouth 2 (two) times daily.    OMEPRAZOLE (PRILOSEC) 40 MG CAPSULE    TAKE 1 CAPSULE(40 MG) BY MOUTH EVERY DAY    ONETOUCH VERIO SYNC KIT    USE TO TEST BLOOD GLUOCSE TWICE DAILY    ONETOUCH VERIO TEST STRIPS STRP    USE AS DIRECTED TO TEST BLOOD SUGAR THREE TO FOUR TIMES DAILY    PRAVASTATIN (PRAVACHOL) 40 MG TABLET    TAKE 1 TABLET BY MOUTH EVERY DAY    SITAGLIPTIN PHOSPHATE (JANUVIA) 25 MG TAB    Take 1 tablet (25 mg total) by mouth once daily.    VITAMIN D (VITAMIN D3) 1000 UNITS TAB    Take 1,000 Units by mouth once daily.   Changed and/or Refilled Medications    Modified Medication Previous Medication    HYDROCHLOROTHIAZIDE (HYDRODIURIL) 12.5 MG TAB hydroCHLOROthiazide (HYDRODIURIL) 12.5 MG Tab       Take 1 tablet (12.5 mg total) by mouth once daily.    TAKE 1 TABLET(12.5 MG) BY MOUTH EVERY DAY     Medical, social  "and surgical history has been reviewed with the patient.      Review of Systems   Constitutional:  Negative for chills and fever.   Respiratory:  Negative for cough and shortness of breath.    Cardiovascular:  Negative for chest pain.   Neurological:  Negative for dizziness and headaches.     Objective:   /62 (BP Location: Right arm, Patient Position: Sitting, BP Method: Medium (Manual))   Pulse 95   Resp 18   Ht 5' 4" (1.626 m)   Wt 96.7 kg (213 lb 3 oz)   SpO2 96%   BMI 36.59 kg/m²       Physical Exam  Vitals reviewed.   Constitutional:       Appearance: Normal appearance.   HENT:      Head: Normocephalic and atraumatic.   Pulmonary:      Effort: Pulmonary effort is normal.   Neurological:      Mental Status: She is alert and oriented to person, place, and time.         I reviewed and independently interpreted the labs and imaging below:  Last Labs:  Glucose   Date Value Ref Range Status   12/28/2023 196 (H) 70 - 110 mg/dL Final   04/21/2023 160 (H) 70 - 110 mg/dL Final     BUN   Date Value Ref Range Status   12/28/2023 11 8 - 23 mg/dL Final   04/21/2023 16 8 - 23 mg/dL Final     Creatinine   Date Value Ref Range Status   12/28/2023 0.7 0.5 - 1.4 mg/dL Final   04/21/2023 0.7 0.5 - 1.4 mg/dL Final     Cholesterol   Date Value Ref Range Status   08/15/2023 193 120 - 199 mg/dL Final     Comment:     The National Cholesterol Education Program (NCEP) has set the  following guidelines (reference ranges) for Cholesterol:  Optimal.....................<200 mg/dL  Borderline High.............200-239 mg/dL  High........................> or = 240 mg/dL     06/28/2022 169 120 - 199 mg/dL Final     Comment:     The National Cholesterol Education Program (NCEP) has set the  following guidelines (reference ranges) for Cholesterol:  Optimal.....................<200 mg/dL  Borderline High.............200-239 mg/dL  High........................> or = 240 mg/dL       Hemoglobin A1C   Date Value Ref Range Status "   05/23/2024 8.0 (H) 4.0 - 5.6 % Final     Comment:     ADA Screening Guidelines:  5.7-6.4%  Consistent with prediabetes  >or=6.5%  Consistent with diabetes    High levels of fetal hemoglobin interfere with the HbA1C  assay. Heterozygous hemoglobin variants (HbS, HgC, etc)do  not significantly interfere with this assay.   However, presence of multiple variants may affect accuracy.     12/28/2023 8.1 (H) 4.0 - 5.6 % Final     Comment:     ADA Screening Guidelines:  5.7-6.4%  Consistent with prediabetes  >or=6.5%  Consistent with diabetes    High levels of fetal hemoglobin interfere with the HbA1C  assay. Heterozygous hemoglobin variants (HbS, HgC, etc)do  not significantly interfere with this assay.   However, presence of multiple variants may affect accuracy.       Hemoglobin   Date Value Ref Range Status   12/28/2023 14.2 12.0 - 16.0 g/dL Final   05/23/2022 14.7 12.0 - 16.0 g/dL Final     Hematocrit   Date Value Ref Range Status   12/28/2023 42.8 37.0 - 48.5 % Final   05/23/2022 44.6 37.0 - 48.5 % Final       Assessment and Plan:     1. HTN (hypertension), benign    Chronic, stable, continue current medication    - CBC Auto Differential; Future  - Comprehensive Metabolic Panel; Future  - Lipid Panel; Future  - TSH; Future  - hydroCHLOROthiazide (HYDRODIURIL) 12.5 MG Tab; Take 1 tablet (12.5 mg total) by mouth once daily.  Dispense: 90 tablet; Refill: 3    2. Type 2 diabetes mellitus without complication, without long-term current use of insulin    Chronic, elevated, continue current medications, follow up in 3 months     - Hemoglobin A1C; Future  - HEMOGLOBIN A1C; Future    3. Encounter for long-term (current) use of medications  - VITAMIN B12; Future  - FOLATE; Future

## 2024-05-25 DIAGNOSIS — I10 HTN (HYPERTENSION), BENIGN: ICD-10-CM

## 2024-05-27 RX ORDER — HYDROCHLOROTHIAZIDE 12.5 MG/1
12.5 TABLET ORAL
Qty: 90 TABLET | Refills: 3 | OUTPATIENT
Start: 2024-05-27

## 2024-05-27 NOTE — TELEPHONE ENCOUNTER
Quick DC. Inappropriate Request    Refill Authorization Note   Carmen Hernandez  is requesting a refill authorization.  Brief Assessment and Rationale for Refill:  Quick Discontinue  Medication Therapy Plan:  Receipt confirmed by pharmacy (5/23/2024  4:23 PM CDT)    Medication Reconciliation Completed:  No      Comments:     Note composed:4:58 PM 05/27/2024

## 2024-06-05 DIAGNOSIS — F41.9 ANXIETY: ICD-10-CM

## 2024-06-05 RX ORDER — ALPRAZOLAM 0.5 MG/1
TABLET ORAL
Qty: 100 TABLET | Refills: 0 | OUTPATIENT
Start: 2024-06-05

## 2024-06-05 RX ORDER — ALPRAZOLAM 0.5 MG/1
TABLET ORAL
Qty: 100 TABLET | Refills: 0 | Status: SHIPPED | OUTPATIENT
Start: 2024-06-05

## 2024-06-05 RX ORDER — METFORMIN HYDROCHLORIDE 500 MG/1
1000 TABLET ORAL 2 TIMES DAILY
Qty: 360 TABLET | Refills: 0 | Status: SHIPPED | OUTPATIENT
Start: 2024-06-05

## 2024-06-05 RX ORDER — METFORMIN HYDROCHLORIDE 500 MG/1
1000 TABLET ORAL 2 TIMES DAILY
Qty: 360 TABLET | Refills: 0 | OUTPATIENT
Start: 2024-06-05

## 2024-06-05 NOTE — TELEPHONE ENCOUNTER
----- Message from Audrey Dewitt sent at 6/5/2024 11:04 AM CDT -----  Contact: [phone]  Pt is requesting a callback in regards to a sooner appt. Pt has been added to the waiting list as well but requested a message be sent. Pt's upcoming appt is scheduled for 08/16. Please call.                 Thank you

## 2024-06-05 NOTE — TELEPHONE ENCOUNTER
Called and spoke to pt stated prescription requests has been sent to [facility] and will be notified once prescription was approved.

## 2024-06-10 DIAGNOSIS — H10.829 ROSACEA BLEPHAROCONJUNCTIVITIS: ICD-10-CM

## 2024-06-10 RX ORDER — DOXYCYCLINE 50 MG/1
CAPSULE ORAL
Qty: 30 CAPSULE | Refills: 3 | Status: SHIPPED | OUTPATIENT
Start: 2024-06-10

## 2024-07-03 ENCOUNTER — OFFICE VISIT (OUTPATIENT)
Dept: INTERNAL MEDICINE | Facility: CLINIC | Age: 64
End: 2024-07-03
Payer: MEDICARE

## 2024-07-03 DIAGNOSIS — F33.0 MILD EPISODE OF RECURRENT MAJOR DEPRESSIVE DISORDER: Primary | ICD-10-CM

## 2024-07-03 DIAGNOSIS — F41.9 ANXIETY: ICD-10-CM

## 2024-07-03 RX ORDER — ALPRAZOLAM 0.5 MG/1
TABLET ORAL
Qty: 100 TABLET | Refills: 0 | Status: SHIPPED | OUTPATIENT
Start: 2024-07-03

## 2024-07-03 RX ORDER — ALPRAZOLAM 0.5 MG/1
TABLET ORAL
Qty: 100 TABLET | Refills: 0 | Status: CANCELLED | OUTPATIENT
Start: 2024-07-03

## 2024-07-03 NOTE — PROGRESS NOTES
Subjective:       Patient ID: Carmen Hernandez is a 64 y.o. female.    Chief Complaint: Medication Refill    Visit type: audiovisual    Carmen Hernandez is a 64 y.o. female who presents today for medication refill. Patient with history of depression and anxiety whom takes Xanax PRN up to 4 times per day. Has upcoming appointment to establish with new PCP. Patient advised I will fill today and again next month to get her to her visit with the new provider.     The patient location is: [place]    Review of patient's allergies indicates:   Allergen Reactions    Demerol [meperidine] Hives    Iodinated contrast media Hives    Bactrim [sulfamethoxazole-trimethoprim] Other (See Comments)     Other reaction(s): blotchy skin    Ciprofloxacin Other (See Comments)     Other reaction(s): blotchy skin    Erythromycin Other (See Comments)     tachycardia       Medication List with Changes/Refills   Current Medications    ALCOHOL SWABS (BD ALCOHOL SWABS) PADM    USE TO CHECK BLOOD GLUCOSE TWICE DAILY    DOXYCYCLINE (MONODOX) 50 MG CAP    TAKE 1 CAPSULE BY MOUTH EVERY DAY    HYDROCHLOROTHIAZIDE (HYDRODIURIL) 12.5 MG TAB    Take 1 tablet (12.5 mg total) by mouth once daily.    LISINOPRIL (PRINIVIL,ZESTRIL) 40 MG TABLET    TAKE 1 TABLET(40 MG) BY MOUTH EVERY DAY    METFORMIN (GLUCOPHAGE) 500 MG TABLET    Take 2 tablets (1,000 mg total) by mouth 2 (two) times daily.    OMEPRAZOLE (PRILOSEC) 40 MG CAPSULE    TAKE 1 CAPSULE(40 MG) BY MOUTH EVERY DAY    ONETOUCH VERIO SYNC KIT    USE TO TEST BLOOD GLUOCSE TWICE DAILY    ONETOUCH VERIO TEST STRIPS STRP    USE AS DIRECTED TO TEST BLOOD SUGAR THREE TO FOUR TIMES DAILY    PRAVASTATIN (PRAVACHOL) 40 MG TABLET    TAKE 1 TABLET BY MOUTH EVERY DAY    SITAGLIPTIN PHOSPHATE (JANUVIA) 25 MG TAB    Take 1 tablet (25 mg total) by mouth once daily.    VITAMIN D (VITAMIN D3) 1000 UNITS TAB    Take 1,000 Units by mouth once daily.   Changed and/or Refilled Medications    Modified  Medication Previous Medication    ALPRAZOLAM (XANAX) 0.5 MG TABLET ALPRAZolam (XANAX) 0.5 MG tablet       TAKE 1 TABLET BY MOUTH FOUR TIMES DAILY AS NEEDED    TAKE 1 TABLET BY MOUTH FOUR TIMES DAILY AS NEEDED       Review of Systems   Constitutional:  Negative for chills and fever.   Respiratory:  Negative for cough and shortness of breath.    Cardiovascular:  Negative for chest pain.   Neurological:  Negative for dizziness and headaches.       Objective:   There were no vitals taken for this visit.    Physical Exam  Vitals reviewed: Exam Limited due to Video Consultation.   Constitutional:       General: She is not in acute distress.  Neurological:      Mental Status: She is alert.       Assessment and Plan:       1. Anxiety  2. Mild episode of recurrent major depressive disorder    Chronic, stable, continue current medication, follow up with PCP     - ALPRAZolam (XANAX) 0.5 MG tablet; TAKE 1 TABLET BY MOUTH FOUR TIMES DAILY AS NEEDED  Dispense: 100 tablet; Refill: 0          Face to Face time with patient: 3  5 minutes of total time spent on the encounter, which includes face to face time and non-face to face time preparing to see the patient (eg, review of tests), Obtaining and/or reviewing separately obtained history, Documenting clinical information in the electronic or other health record, Independently interpreting results (not separately reported) and communicating results to the patient/family/caregiver, or Care coordination (not separately reported).     Each patient to whom he or she provides medical services by telemedicine is:  (1) informed of the relationship between the physician and patient and the respective role of any other health care provider with respect to management of the patient; and (2) notified that he or she may decline to receive medical services by telemedicine and may withdraw from such care at any time.

## 2024-07-03 NOTE — TELEPHONE ENCOUNTER
----- Message from Christin Ross sent at 7/3/2024 11:41 AM CDT -----  .Type:  RX Refill Request    Who Called? PT     Refill or New Rx? REFILL     RX Name and Strength?   ALPRAZolam (XANAX) 0.5 MG tablet    Preferred Pharmacy with phone number?  [phone]    Pt Call Back Number? [phone]    Additional Information: PT IS COMPLETELY OUT OF MEDICATION. SHE HAS AN APPOINTMENT 8/2024      Thank You

## 2024-07-31 DIAGNOSIS — F41.9 ANXIETY: ICD-10-CM

## 2024-07-31 DIAGNOSIS — F33.0 MILD EPISODE OF RECURRENT MAJOR DEPRESSIVE DISORDER: ICD-10-CM

## 2024-08-01 ENCOUNTER — OFFICE VISIT (OUTPATIENT)
Dept: FAMILY MEDICINE | Facility: CLINIC | Age: 64
End: 2024-08-01
Payer: MEDICARE

## 2024-08-01 VITALS
SYSTOLIC BLOOD PRESSURE: 142 MMHG | BODY MASS INDEX: 36.25 KG/M2 | WEIGHT: 212.31 LBS | HEIGHT: 64 IN | DIASTOLIC BLOOD PRESSURE: 76 MMHG | OXYGEN SATURATION: 98 % | HEART RATE: 91 BPM

## 2024-08-01 DIAGNOSIS — K21.00 GASTROESOPHAGEAL REFLUX DISEASE WITH ESOPHAGITIS WITHOUT HEMORRHAGE: ICD-10-CM

## 2024-08-01 DIAGNOSIS — E78.2 MIXED HYPERLIPIDEMIA: ICD-10-CM

## 2024-08-01 DIAGNOSIS — Z79.899 CHRONIC PRESCRIPTION BENZODIAZEPINE USE: ICD-10-CM

## 2024-08-01 DIAGNOSIS — E11.65 TYPE 2 DIABETES MELLITUS WITH HYPERGLYCEMIA, WITHOUT LONG-TERM CURRENT USE OF INSULIN: Primary | ICD-10-CM

## 2024-08-01 DIAGNOSIS — E66.01 CLASS 2 SEVERE OBESITY DUE TO EXCESS CALORIES WITH SERIOUS COMORBIDITY AND BODY MASS INDEX (BMI) OF 36.0 TO 36.9 IN ADULT: ICD-10-CM

## 2024-08-01 DIAGNOSIS — F41.1 GENERALIZED ANXIETY DISORDER: ICD-10-CM

## 2024-08-01 DIAGNOSIS — F33.42 RECURRENT MAJOR DEPRESSIVE DISORDER, IN FULL REMISSION: ICD-10-CM

## 2024-08-01 DIAGNOSIS — I10 PRIMARY HYPERTENSION: ICD-10-CM

## 2024-08-01 PROBLEM — E66.812 CLASS 2 SEVERE OBESITY DUE TO EXCESS CALORIES WITH SERIOUS COMORBIDITY AND BODY MASS INDEX (BMI) OF 36.0 TO 36.9 IN ADULT: Status: ACTIVE | Noted: 2017-10-17

## 2024-08-01 PROCEDURE — 99999 PR PBB SHADOW E&M-EST. PATIENT-LVL IV: CPT | Mod: PBBFAC,,, | Performed by: STUDENT IN AN ORGANIZED HEALTH CARE EDUCATION/TRAINING PROGRAM

## 2024-08-01 PROCEDURE — 99214 OFFICE O/P EST MOD 30 MIN: CPT | Mod: PBBFAC,PO | Performed by: STUDENT IN AN ORGANIZED HEALTH CARE EDUCATION/TRAINING PROGRAM

## 2024-08-01 RX ORDER — PRAVASTATIN SODIUM 40 MG/1
40 TABLET ORAL DAILY
Qty: 90 TABLET | Refills: 3 | Status: SHIPPED | OUTPATIENT
Start: 2024-08-01

## 2024-08-01 RX ORDER — METFORMIN HYDROCHLORIDE 500 MG/1
1000 TABLET ORAL 2 TIMES DAILY
Qty: 360 TABLET | Refills: 3 | Status: SHIPPED | OUTPATIENT
Start: 2024-08-01

## 2024-08-01 RX ORDER — HYDROCHLOROTHIAZIDE 25 MG/1
25 TABLET ORAL DAILY
Qty: 90 TABLET | Refills: 3 | Status: SHIPPED | OUTPATIENT
Start: 2024-08-01

## 2024-08-01 RX ORDER — LISINOPRIL 40 MG/1
40 TABLET ORAL DAILY
Qty: 90 TABLET | Refills: 3 | Status: SHIPPED | OUTPATIENT
Start: 2024-08-01

## 2024-08-01 RX ORDER — INSULIN PUMP SYRINGE, 3 ML
EACH MISCELLANEOUS
Qty: 1 EACH | Refills: 0 | Status: SHIPPED | OUTPATIENT
Start: 2024-08-01 | End: 2025-08-01

## 2024-08-01 RX ORDER — ALPRAZOLAM 0.5 MG/1
TABLET ORAL
Qty: 100 TABLET | OUTPATIENT
Start: 2024-08-01

## 2024-08-01 RX ORDER — OMEPRAZOLE 20 MG/1
20 CAPSULE, DELAYED RELEASE ORAL DAILY
Qty: 90 CAPSULE | Refills: 3 | Status: SHIPPED | OUTPATIENT
Start: 2024-08-01

## 2024-08-01 RX ORDER — ALPRAZOLAM 0.5 MG/1
0.5 TABLET ORAL 4 TIMES DAILY PRN
Qty: 120 TABLET | Refills: 1 | Status: SHIPPED | OUTPATIENT
Start: 2024-08-01

## 2024-08-01 RX ORDER — MULTIVITAMIN
1 TABLET ORAL DAILY
COMMUNITY

## 2024-08-01 RX ORDER — LANCETS
EACH MISCELLANEOUS
Qty: 200 EACH | Refills: 0 | Status: SHIPPED | OUTPATIENT
Start: 2024-08-01

## 2024-08-01 NOTE — PROGRESS NOTES
Name: Carmen Hernandez  MRN: [id number]  : 1960    Subjective   HPI  Patient presents to establish care.     Review of Systems   HENT:  Negative for hearing loss.    Eyes:  Negative for visual disturbance.   Respiratory:  Negative for shortness of breath.    Cardiovascular:  Negative for chest pain.   Gastrointestinal:  Negative for diarrhea.   Musculoskeletal:  Positive for arthralgias (occasional right knee pain, occasional left arm pain). Negative for back pain.   Neurological:  Negative for dizziness and light-headedness.   Psychiatric/Behavioral:  Negative for dysphoric mood and sleep disturbance. The patient is nervous/anxious.         Patient Active Problem List   Diagnosis    Diabetes mellitus, type II    Dyslipidemia    Pain of right thumb    Decreased range of motion of thumb    Decreased functional activity tolerance    Decreased  strength of right hand    Laryngopharyngeal reflux (LPR)    Gastroesophageal reflux disease    Severe obesity (BMI 35.0-39.9) with comorbidity    Hypertension associated with diabetes    Episode of recurrent major depressive disorder    History of colon polyps       Current Outpatient Medications on File Prior to Visit   Medication Sig Dispense Refill    ALPRAZolam (XANAX) 0.5 MG tablet TAKE 1 TABLET BY MOUTH FOUR TIMES DAILY AS NEEDED 100 tablet 0    doxycycline (MONODOX) 50 MG Cap TAKE 1 CAPSULE BY MOUTH EVERY DAY 30 capsule 3    hydroCHLOROthiazide (HYDRODIURIL) 12.5 MG Tab Take 1 tablet (12.5 mg total) by mouth once daily. 90 tablet 3    lisinopriL (PRINIVIL,ZESTRIL) 40 MG tablet TAKE 1 TABLET(40 MG) BY MOUTH EVERY DAY 90 tablet 3    metFORMIN (GLUCOPHAGE) 500 MG tablet Take 2 tablets (1,000 mg total) by mouth 2 (two) times daily. 360 tablet 0    omeprazole (PRILOSEC) 40 MG capsule TAKE 1 CAPSULE(40 MG) BY MOUTH EVERY DAY 90 capsule 3    pravastatin (PRAVACHOL) 40 MG tablet TAKE 1 TABLET BY MOUTH EVERY DAY 90 tablet 3    SITagliptin phosphate (JANUVIA) 25 MG  Tab Take 1 tablet (25 mg total) by mouth once daily. 90 tablet 3    alcohol swabs (BD ALCOHOL SWABS) PadM USE TO CHECK BLOOD GLUCOSE TWICE DAILY 200 each 0    ONETOUCH VERIO SYNC kit USE TO TEST BLOOD GLUOCSE TWICE DAILY 1 each 0    ONETOUCH VERIO TEST STRIPS Strp USE AS DIRECTED TO TEST BLOOD SUGAR THREE TO FOUR TIMES DAILY 200 strip 11    vitamin D (VITAMIN D3) 1000 units Tab Take 1,000 Units by mouth once daily.       No current facility-administered medications on file prior to visit.       Past Medical History:   Diagnosis Date    Anxiety     Bilateral dry eyes     uses atears//    Cataract     Diabetes mellitus     LBSL 128 today---fluctuates    Diverticulosis     GERD (gastroesophageal reflux disease)     Hyperlipidemia     Hypertension     Irritable bowel syndrome     Nephrolithiasis     Severe obesity (BMI 35.0-39.9) with comorbidity        Past Surgical History:   Procedure Laterality Date    COLONOSCOPY  05/16/2013    PAM.   Diverticulosis in the sigmoid colon.  Tortuous colon.  Irritable bowel syndrome?.  Internal hemorrhoids.  Redundant colon. repeat in 5-6 years    COLONOSCOPY N/A 10/31/2018    Procedure: COLONOSCOPY;  Surgeon: Jr Santos Jr., MD;  Location: [facility];  Service: Endoscopy;  Laterality: N/A;    COLONOSCOPY N/A 1/18/2024    Procedure: COLONOSCOPY;  Surgeon: Jr Santos Jr., MD;  Location: [facility];  Service: Endoscopy;  Laterality: N/A;    COLONOSCOPY W/ POLYPECTOMY  5/13/2009  Rohan SPENCER.   One 4 mm polyp in the transverse colon.  HYPERPLASTIC POLYP.    Diverticulosis sigmoid colon.    Tortuous colon.   Granularity hepatic flexure.     CYSTOSCOPY W/ STONE MANIPULATION      ENDOMETRIAL BIOPSY  X2    ESOPHAGOGASTRODUODENOSCOPY N/A 3/14/2023    Procedure: EGD (ESOPHAGOGASTRODUODENOSCOPY);  Surgeon: Jr Santos Jr., MD;  Location: [facility] END[facility];  Service: Endoscopy;  Laterality: N/A;    HYSTERECTOMY  2013    OOPHORECTOMY  2013    UPPER GASTROINTESTINAL ENDOSCOPY   09/26/2017    Dr. Santos        Family History   Problem Relation Name Age of Onset    Cataracts Mother      Heart disease Mother      Hypertension Mother      Diabetes Father      Hypertension Father      No Known Problems Sister      No Known Problems Daughter      No Known Problems Maternal Aunt      No Known Problems Maternal Uncle      No Known Problems Paternal Aunt      No Known Problems Paternal Uncle      No Known Problems Maternal Grandmother      No Known Problems Maternal Grandfather      No Known Problems Paternal Grandmother      No Known Problems Paternal Grandfather      Diabetes Brother      Hypertension Brother      No Known Problems Other      Macular degeneration Neg Hx      Retinal detachment Neg Hx      Strabismus Neg Hx      Stroke Neg Hx      Thyroid disease Neg Hx      Glaucoma Neg Hx      Cancer Neg Hx      Amblyopia Neg Hx      Blindness Neg Hx      Breast cancer Neg Hx      Colon cancer Neg Hx      Colon polyps Neg Hx      Crohn's disease Neg Hx      Ulcerative colitis Neg Hx      Stomach cancer Neg Hx      Esophageal cancer Neg Hx      Celiac disease Neg Hx      Ovarian cancer Neg Hx      BRCA 1/2 Neg Hx         Social History     Socioeconomic History    Marital status:    Tobacco Use    Smoking status: Former     Current packs/day: 0.00     Types: Cigarettes     Quit date: 8/12/2013     Years since quitting: 10.9    Smokeless tobacco: Never   Substance and Sexual Activity    Alcohol use: No    Drug use: No    Sexual activity: Not Currently     Birth control/protection: Post-menopausal     Social Determinants of Health     Financial Resource Strain: Low Risk  (7/3/2024)    Overall Financial Resource Strain (CARDIA)     Difficulty of Paying Living Expenses: Not hard at all   Food Insecurity: No Food Insecurity (7/3/2024)    Hunger Vital Sign     Worried About Running Out of Food in the Last Year: Never true     Ran Out of Food in the Last Year: Never true   Physical Activity:  Insufficiently Active (7/3/2024)    Exercise Vital Sign     Days of Exercise per Week: 2 days     Minutes of Exercise per Session: 30 min   Housing Stability: Unknown (7/3/2024)    Housing Stability Vital Sign     Unable to Pay for Housing in the Last Year: No       Review of patient's allergies indicates:   Allergen Reactions    Demerol [meperidine] Hives    Iodinated contrast media Hives    Bactrim [sulfamethoxazole-trimethoprim] Other (See Comments)     Other reaction(s): blotchy skin    Ciprofloxacin Other (See Comments)     Other reaction(s): blotchy skin    Erythromycin Other (See Comments)     tachycardia        Health Maintenance Due   Topic Date Due    Pneumococcal Vaccines (Age 65+) (1 of 2 - PCV) Never done    TETANUS VACCINE  Never done    DEXA Scan  Never done    Shingles Vaccine (1 of 2) Never done    RSV Vaccine (Age 60+ and Pregnant patients) (1 - 1-dose 60+ series) Never done    COVID-19 Vaccine (2 - 2023-24 season) 09/01/2023    Foot Exam  04/21/2024    Lipid Panel  08/15/2024    Hemoglobin A1c  08/23/2024    Eye Exam  10/12/2024       Objective   Vitals:    08/01/24 1436   BP: (!) 156/80   Pulse: 91        Physical Exam  Constitutional:       General: She is not in acute distress.     Appearance: Normal appearance. She is well-developed.   HENT:      Head: Normocephalic and atraumatic.      Right Ear: External ear normal.      Left Ear: External ear normal.   Eyes:      Conjunctiva/sclera: Conjunctivae normal.   Cardiovascular:      Rate and Rhythm: Normal rate and regular rhythm.      Heart sounds: No murmur heard.     No friction rub. No gallop.   Pulmonary:      Effort: Pulmonary effort is normal. No respiratory distress.      Breath sounds: No wheezing, rhonchi or rales.   Abdominal:      General: Abdomen is flat. There is no distension.   Musculoskeletal:         General: No swelling or deformity.      Right lower leg: No edema.      Left lower leg: No edema.   Skin:     General: Skin is warm  and dry.      Coloration: Skin is not jaundiced.   Neurological:      Mental Status: She is alert and oriented to person, place, and time. Mental status is at baseline.   Psychiatric:         Attention and Perception: Attention and perception normal.         Mood and Affect: Mood normal.         Speech: Speech normal.         Behavior: Behavior normal. Behavior is cooperative.         Thought Content: Thought content normal.         Cognition and Memory: Cognition normal.         Judgment: Judgment normal.          Assessment & Plan   1. Type 2 diabetes mellitus with hyperglycemia, without long-term current use of insulin  -     blood-glucose meter kit; To check BG 4 times daily, to use with insurance preferred meter  Dispense: 1 each; Refill: 0  -     lancets Misc; To check BG 4 times daily, to use with insurance preferred meter  Dispense: 200 each; Refill: 0  -     blood sugar diagnostic Strp; To check BG 4 times daily, to use with insurance preferred meter  Dispense: 200 each; Refill: 11  -     metFORMIN (GLUCOPHAGE) 500 MG tablet; Take 2 tablets (1,000 mg total) by mouth 2 (two) times daily.  Dispense: 360 tablet; Refill: 3  -     pravastatin (PRAVACHOL) 40 MG tablet; Take 1 tablet (40 mg total) by mouth once daily.  Dispense: 90 tablet; Refill: 3  -     SITagliptin phosphate (JANUVIA) 25 MG Tab; Take 1 tablet (25 mg total) by mouth once daily.  Dispense: 90 tablet; Refill: 3  -     Comprehensive Metabolic Panel; Future; Expected date: 08/01/2024  -     Hemoglobin A1C; Future; Expected date: 08/01/2024  -     Lipid Panel; Future; Expected date: 08/01/2024    2. Primary hypertension  Assessment & Plan:  Not at goal. Continue lisinopril 40mg. Increase hydrochlorothiazide from 12.5mg to 25mg.     Orders:  -     hydroCHLOROthiazide (HYDRODIURIL) 25 MG tablet; Take 1 tablet (25 mg total) by mouth once daily.  Dispense: 90 tablet; Refill: 3  -     lisinopriL (PRINIVIL,ZESTRIL) 40 MG tablet; Take 1 tablet (40 mg total)  by mouth once daily.  Dispense: 90 tablet; Refill: 3  -     Comprehensive Metabolic Panel; Future; Expected date: 08/01/2024    3. Mixed hyperlipidemia  -     pravastatin (PRAVACHOL) 40 MG tablet; Take 1 tablet (40 mg total) by mouth once daily.  Dispense: 90 tablet; Refill: 3  -     Lipid Panel; Future; Expected date: 08/01/2024    4. Class 2 severe obesity due to excess calories with serious comorbidity and body mass index (BMI) of 36.0 to 36.9 in adult  Assessment & Plan:  Patient has gained weight since March. We did not have enough time discuss weight however. We will discuss at a future visit as weight loss should help diabetes and hypertension.      5. Gastroesophageal reflux disease with esophagitis without hemorrhage  -     omeprazole (PRILOSEC) 20 MG capsule; Take 1 capsule (20 mg total) by mouth once daily.  Dispense: 90 capsule; Refill: 3    6. Recurrent major depressive disorder, in full remission  Assessment & Plan:  Mood is currently stable. Not currently on any medications. Continue to monitor.      7. Generalized anxiety disorder  Assessment & Plan:  Variably controlled. Of note, patient says that she has been tried on several different medications many, many years ago before her providers at the time settled on chronic Xanax use. She is currently taking up to four doses per day. The longest she has gone without a dose is ten hours. I reviewed the effects of chronic Xanax use. My ultimate goal is to wean her off entirely. We will spend more time at her next visit reviewing anxiety history and prior medications while devising a taper plan.     Orders:  -     ALPRAZolam (XANAX) 0.5 MG tablet; Take 1 tablet (0.5 mg total) by mouth 4 (four) times daily as needed for Anxiety. TAKE 1 TABLET BY MOUTH FOUR TIMES DAILY AS NEEDED  Dispense: 120 tablet; Refill: 1    8. Chronic prescription benzodiazepine use  -     ALPRAZolam (XANAX) 0.5 MG tablet; Take 1 tablet (0.5 mg total) by mouth 4 (four) times daily as  needed for Anxiety. TAKE 1 TABLET BY MOUTH FOUR TIMES DAILY AS NEEDED  Dispense: 120 tablet; Refill: 1        Follow up in 2-3 months.     Dorian Godinez MD  08/01/2024

## 2024-08-01 NOTE — ASSESSMENT & PLAN NOTE
Variably controlled. Of note, patient says that she has been tried on several different medications many, many years ago before her providers at the time settled on chronic Xanax use. She is currently taking up to four doses per day. The longest she has gone without a dose is ten hours. I reviewed the effects of chronic Xanax use. My ultimate goal is to wean her off entirely. We will spend more time at her next visit reviewing anxiety history and prior medications while devising a taper plan.

## 2024-08-01 NOTE — ASSESSMENT & PLAN NOTE
Patient has gained weight since March. We did not have enough time discuss weight however. We will discuss at a future visit as weight loss should help diabetes and hypertension.

## 2024-08-27 ENCOUNTER — PATIENT MESSAGE (OUTPATIENT)
Dept: FAMILY MEDICINE | Facility: CLINIC | Age: 64
End: 2024-08-27
Payer: MEDICARE

## 2024-09-29 DIAGNOSIS — H10.829 ROSACEA BLEPHAROCONJUNCTIVITIS: ICD-10-CM

## 2024-09-30 DIAGNOSIS — E11.65 TYPE 2 DIABETES MELLITUS WITH HYPERGLYCEMIA, WITHOUT LONG-TERM CURRENT USE OF INSULIN: ICD-10-CM

## 2024-09-30 RX ORDER — DOXYCYCLINE 50 MG/1
CAPSULE ORAL
Qty: 30 CAPSULE | Refills: 3 | Status: SHIPPED | OUTPATIENT
Start: 2024-09-30

## 2024-09-30 RX ORDER — LANCETS 33 GAUGE
EACH MISCELLANEOUS
Qty: 400 EACH | Refills: 3 | Status: SHIPPED | OUTPATIENT
Start: 2024-09-30

## 2024-09-30 NOTE — TELEPHONE ENCOUNTER
Refill Routing Note   Medication(s) are not appropriate for processing by [facility] for the following reason(s):        New or recently adjusted medication    [facility] action(s):  Defer        Medication Therapy Plan: FLOS      Appointments  past 12m or future 3m with PCP    Date Provider   Last Visit   8/1/2024 Dorian Godinez MD   Next Visit   10/29/2024 Dorian Godinez MD   ED visits in past 90 days: 0        Note composed:12:14 PM 09/30/2024

## 2024-09-30 NOTE — TELEPHONE ENCOUNTER
Care Due:                  Date            Visit Type   Department     Provider  --------------------------------------------------------------------------------                                             [facility] FAMILY  Last Visit: 08-      [facility]          RORY Godinez                               -                              [facility] FAMILY  Next Visit: 10-      [facility]   MEDICINE       Dorian Godinez                                                            Last  Test          Frequency    Reason                     Performed    Due Date  --------------------------------------------------------------------------------    CMP.........  12 months..  SITagliptin,               12- 12-                             hydroCHLOROthiazide,                             lisinopriL, metFORMIN,                             pravastatin..............    HBA1C.......  6 months...  SITagliptin, metFORMIN...  05- 11-    Lipid Panel.  12 months..  pravastatin..............  08-   08-    [facility] Embedded Care Due Messages. Reference number: [id number].   9/30/2024 3:37:35 AM CDT

## 2024-10-01 DIAGNOSIS — Z79.899 CHRONIC PRESCRIPTION BENZODIAZEPINE USE: ICD-10-CM

## 2024-10-01 DIAGNOSIS — F41.1 GENERALIZED ANXIETY DISORDER: ICD-10-CM

## 2024-10-01 RX ORDER — ALPRAZOLAM 0.5 MG/1
TABLET ORAL
Qty: 120 TABLET | Refills: 1 | Status: SHIPPED | OUTPATIENT
Start: 2024-10-01

## 2024-10-01 NOTE — TELEPHONE ENCOUNTER
No care due was identified.  [facility] Embedded Care Due Messages. Reference number: [id number].   10/01/2024 3:23:03 PM CDT

## 2024-10-14 ENCOUNTER — OFFICE VISIT (OUTPATIENT)
Dept: OPTOMETRY | Facility: CLINIC | Age: 64
End: 2024-10-14
Payer: MEDICARE

## 2024-10-14 DIAGNOSIS — H01.133 ATOPIC DERMATITIS OF EYELID, RIGHT: Primary | ICD-10-CM

## 2024-10-14 PROCEDURE — 99213 OFFICE O/P EST LOW 20 MIN: CPT | Mod: PBBFAC,PO | Performed by: OPTOMETRIST

## 2024-10-14 PROCEDURE — 99999 PR PBB SHADOW E&M-EST. PATIENT-LVL III: CPT | Mod: PBBFAC,,, | Performed by: OPTOMETRIST

## 2024-10-14 NOTE — PROGRESS NOTES
HPI    Eye problem-OD swelling    Pt complains of right eyelid swelling x 3 days. Complains of some itching.   No gtts. Using cool and warm compress with little relief. States eye feels   tender to touch. Pain scale 3 today.   Last edited by Lauren Conroy on 10/14/2024  1:45 PM.            Assessment /Plan     For exam results, see Encounter Report.    Atopic dermatitis of eyelid, right  -     loteprednol etabonate 0.5 % Oint; Apply sparingly to eyelid skin tid x 5 days  Dispense: 3.5 g; Refill: 0      Eyelid sup>inferior edema/ atopic changes   Denies changes in current face soap/  shampoo products   Chemical exposure vs less probable insect bite    Globe appears normal / vision intact     Short course lotemax brenna to affected lid skin   Cool / cold pack   Oral benadryl if tolerated   Defer oral steroid due to DM2   If  cost prohibitive, can consider PF gtts to apply to area w/ fingertip    Message with report 1-2 days, sooner if increased pain or blurred vision    RTC for annual exam already scheduled 1-2 weeks prn

## 2024-10-14 NOTE — PATIENT INSTRUCTIONS
"DRY EYES -- BURNING OR NORMAN SYMPTOMS:  Use Over The Counter artificial tears as needed for dry eye symptoms.   Some common brands include:  Systane, Optive, Refresh, and Thera-Tears.  These drops can be used as frequently as desired, but may be most helpful use during long periods of concentrated work.  For example, reading / working at the computer. Start with 3-4x per day.     Nighttime Ophthalmic gel or ointments are available: Refresh PM, Genteal, and Lacrilube.    Avoid drops that "get redness out" (Visine, Murine, Clear Eyes), as these may contain medication that could further irritate the eyes, especially with chronic use.    ALLERGY EYES -- ITCHING SYMPTOMS:  Over the counter medications include--Pataday, Zaditor, and Alaway.  Use as directed 1-2 drops daily for symptoms of itching / watering eyes.  These drops will not help for dry eye or exposure symptoms.    REDNESS RELIEF:  Lumify---is a good redness reliever that will not cause irritation if used chronically.          "

## 2024-10-17 ENCOUNTER — PATIENT MESSAGE (OUTPATIENT)
Dept: OPTOMETRY | Facility: CLINIC | Age: 64
End: 2024-10-17
Payer: MEDICARE

## 2024-10-23 ENCOUNTER — OFFICE VISIT (OUTPATIENT)
Dept: OPTOMETRY | Facility: CLINIC | Age: 64
End: 2024-10-23
Payer: MEDICARE

## 2024-10-23 DIAGNOSIS — H00.021 HORDEOLUM INTERNUM OF RIGHT UPPER EYELID: Primary | ICD-10-CM

## 2024-10-23 DIAGNOSIS — H10.829 ROSACEA BLEPHAROCONJUNCTIVITIS: ICD-10-CM

## 2024-10-23 PROCEDURE — 99999 PR PBB SHADOW E&M-EST. PATIENT-LVL III: CPT | Mod: PBBFAC,,, | Performed by: OPTOMETRIST

## 2024-10-23 PROCEDURE — 99213 OFFICE O/P EST LOW 20 MIN: CPT | Mod: S$PBB,,, | Performed by: OPTOMETRIST

## 2024-10-23 PROCEDURE — 99213 OFFICE O/P EST LOW 20 MIN: CPT | Mod: PBBFAC,PO | Performed by: OPTOMETRIST

## 2024-10-23 RX ORDER — DOXYCYCLINE 50 MG/1
50 CAPSULE ORAL DAILY
Qty: 30 CAPSULE | Refills: 3 | Status: SHIPPED | OUTPATIENT
Start: 2024-10-23

## 2024-10-23 NOTE — PROGRESS NOTES
HPI    Pt here for OD DLS- 10/14/24    Pt sts bump on RUL has grown over the last 9 days, its causing blurry   vision, pressure on OD , painful to touch, itching and discharge. Pt   finished lotemax and doing warm compresses with no relief.   Last edited by Namrata Obrien on 10/23/2024  3:29 PM.            Assessment /Plan     For exam results, see Encounter Report.    Rosacea blepharoconjunctivitis  -     doxycycline (MONODOX) 50 MG Cap; Take 1 capsule (50 mg total) by mouth once daily.  Dispense: 30 capsule; Refill: 3      ***

## 2024-10-23 NOTE — PATIENT INSTRUCTIONS
TAKE DOXYCYCLINE 50 MG   2 IN MORNING AND 2 IN EVENING X 7 DAYS     USE LOTEMAX OINTMENT   APPLY TO TENDER LESION / AND LASHES 3X/ DAY FOR 7 DAYS

## 2024-10-23 NOTE — PROGRESS NOTES
HPI    Pt here for OD DLS- 10/14/24    Pt sts bump on RUL has grown over the last 9 days, its causing blurry   vision, pressure on OD , painful to touch, itching and discharge. Pt   finished lotemax and doing warm compresses with no relief.   Last edited by Namrata Obrien on 10/23/2024  3:29 PM.            Assessment /Plan     For exam results, see Encounter Report.    Hordeolum internum of right upper eyelid    Rosacea blepharoconjunctivitis  -     doxycycline (MONODOX) 50 MG Cap; Take 1 capsule (50 mg total) by mouth once daily.  Dispense: 30 capsule; Refill: 3      1,2.   Atopic issues have improved  OD w/ acute hordeolum worsening over 3-4 days   Had taken 50 mg doxy twice daily with no relief     Due to medication allergies and sensitivities ---will use doxy 50mg increased to ii po bid ---200 mg daily x 7 days   Apply lotemax brenna to lesion and lid margin tid x 7 days     Message with report 24-48 hours   If not effective, will have to consider other abx or acute referral

## 2024-10-25 ENCOUNTER — LAB VISIT (OUTPATIENT)
Dept: LAB | Facility: HOSPITAL | Age: 64
End: 2024-10-25
Attending: STUDENT IN AN ORGANIZED HEALTH CARE EDUCATION/TRAINING PROGRAM
Payer: MEDICARE

## 2024-10-25 DIAGNOSIS — I10 PRIMARY HYPERTENSION: ICD-10-CM

## 2024-10-25 DIAGNOSIS — E11.65 TYPE 2 DIABETES MELLITUS WITH HYPERGLYCEMIA, WITHOUT LONG-TERM CURRENT USE OF INSULIN: ICD-10-CM

## 2024-10-25 DIAGNOSIS — E78.2 MIXED HYPERLIPIDEMIA: ICD-10-CM

## 2024-10-25 LAB
ALBUMIN SERPL BCP-MCNC: 3.7 G/DL (ref 3.5–5.2)
ALP SERPL-CCNC: 55 U/L (ref 40–150)
ALT SERPL W/O P-5'-P-CCNC: 35 U/L (ref 10–44)
ANION GAP SERPL CALC-SCNC: 11 MMOL/L (ref 8–16)
AST SERPL-CCNC: 22 U/L (ref 10–40)
BILIRUB SERPL-MCNC: 0.3 MG/DL (ref 0.1–1)
BUN SERPL-MCNC: 17 MG/DL (ref 8–23)
CALCIUM SERPL-MCNC: 9.8 MG/DL (ref 8.7–10.5)
CHLORIDE SERPL-SCNC: 102 MMOL/L (ref 95–110)
CHOLEST SERPL-MCNC: 186 MG/DL (ref 120–199)
CHOLEST/HDLC SERPL: 3.5 {RATIO} (ref 2–5)
CO2 SERPL-SCNC: 24 MMOL/L (ref 23–29)
CREAT SERPL-MCNC: 0.8 MG/DL (ref 0.5–1.4)
EST. GFR  (NO RACE VARIABLE): >60 ML/MIN/1.73 M^2
ESTIMATED AVG GLUCOSE: 177 MG/DL (ref 68–131)
GLUCOSE SERPL-MCNC: 203 MG/DL (ref 70–110)
HBA1C MFR BLD: 7.8 % (ref 4–5.6)
HDLC SERPL-MCNC: 53 MG/DL (ref 40–75)
HDLC SERPL: 28.5 % (ref 20–50)
LDLC SERPL CALC-MCNC: 106 MG/DL (ref 63–159)
NONHDLC SERPL-MCNC: 133 MG/DL
POTASSIUM SERPL-SCNC: 4.1 MMOL/L (ref 3.5–5.1)
PROT SERPL-MCNC: 6.9 G/DL (ref 6–8.4)
SODIUM SERPL-SCNC: 137 MMOL/L (ref 136–145)
TRIGL SERPL-MCNC: 135 MG/DL (ref 30–150)

## 2024-10-25 PROCEDURE — 80053 COMPREHEN METABOLIC PANEL: CPT | Mod: PO | Performed by: STUDENT IN AN ORGANIZED HEALTH CARE EDUCATION/TRAINING PROGRAM

## 2024-10-25 PROCEDURE — 80061 LIPID PANEL: CPT | Performed by: STUDENT IN AN ORGANIZED HEALTH CARE EDUCATION/TRAINING PROGRAM

## 2024-10-25 PROCEDURE — 36415 COLL VENOUS BLD VENIPUNCTURE: CPT | Mod: PO | Performed by: STUDENT IN AN ORGANIZED HEALTH CARE EDUCATION/TRAINING PROGRAM

## 2024-10-25 PROCEDURE — 83036 HEMOGLOBIN GLYCOSYLATED A1C: CPT | Performed by: STUDENT IN AN ORGANIZED HEALTH CARE EDUCATION/TRAINING PROGRAM

## 2024-10-29 ENCOUNTER — TELEPHONE (OUTPATIENT)
Dept: FAMILY MEDICINE | Facility: CLINIC | Age: 64
End: 2024-10-29
Payer: MEDICARE

## 2024-11-05 ENCOUNTER — OFFICE VISIT (OUTPATIENT)
Dept: FAMILY MEDICINE | Facility: CLINIC | Age: 64
End: 2024-11-05
Payer: MEDICARE

## 2024-11-05 VITALS
HEART RATE: 80 BPM | SYSTOLIC BLOOD PRESSURE: 138 MMHG | OXYGEN SATURATION: 98 % | WEIGHT: 211.44 LBS | DIASTOLIC BLOOD PRESSURE: 78 MMHG | BODY MASS INDEX: 36.29 KG/M2

## 2024-11-05 DIAGNOSIS — K21.00 GASTROESOPHAGEAL REFLUX DISEASE WITH ESOPHAGITIS WITHOUT HEMORRHAGE: ICD-10-CM

## 2024-11-05 DIAGNOSIS — I10 PRIMARY HYPERTENSION: ICD-10-CM

## 2024-11-05 DIAGNOSIS — E11.65 TYPE 2 DIABETES MELLITUS WITH HYPERGLYCEMIA, WITHOUT LONG-TERM CURRENT USE OF INSULIN: ICD-10-CM

## 2024-11-05 DIAGNOSIS — F41.1 GENERALIZED ANXIETY DISORDER: Primary | ICD-10-CM

## 2024-11-05 DIAGNOSIS — Z79.899 CHRONIC PRESCRIPTION BENZODIAZEPINE USE: ICD-10-CM

## 2024-11-05 PROCEDURE — 99999 PR PBB SHADOW E&M-EST. PATIENT-LVL IV: CPT | Mod: PBBFAC,,, | Performed by: STUDENT IN AN ORGANIZED HEALTH CARE EDUCATION/TRAINING PROGRAM

## 2024-11-05 PROCEDURE — 99214 OFFICE O/P EST MOD 30 MIN: CPT | Mod: PBBFAC,PO | Performed by: STUDENT IN AN ORGANIZED HEALTH CARE EDUCATION/TRAINING PROGRAM

## 2024-11-05 RX ORDER — HYDROCHLOROTHIAZIDE 12.5 MG/1
12.5 TABLET ORAL DAILY
Qty: 90 TABLET | Refills: 3 | Status: SHIPPED | OUTPATIENT
Start: 2024-11-05

## 2024-11-05 NOTE — PROGRESS NOTES
Name: Carmen Hernandez  MRN: [id number]  : 1960    Subjective   HPI  Patient presents for follow up of diabetes, hypertension, and anxiety.    Review of Systems   Genitourinary:  Positive for frequency.   Psychiatric/Behavioral:  The patient is nervous/anxious.         Patient Active Problem List   Diagnosis    Type 2 diabetes mellitus with hyperglycemia, without long-term current use of insulin    Mixed hyperlipidemia    Pain of right thumb    Decreased range of motion of thumb    Decreased functional activity tolerance    Decreased  strength of right hand    Laryngopharyngeal reflux (LPR)    Gastroesophageal reflux disease    Class 2 severe obesity due to excess calories with serious comorbidity and body mass index (BMI) of 36.0 to 36.9 in adult    Primary hypertension    Recurrent major depressive disorder, in full remission    History of colon polyps    Generalized anxiety disorder    Chronic prescription benzodiazepine use       Current Outpatient Medications on File Prior to Visit   Medication Sig Dispense Refill    ALPRAZolam (XANAX) 0.5 MG tablet TAKE 1 TABLET BY MOUTH FOUR TIMES DAILY AS NEEDED FOR ANXIETY 120 tablet 1    blood sugar diagnostic Strp To check BG 4 times daily, to use with insurance preferred meter 200 each 11    blood-glucose meter kit To check BG 4 times daily, to use with insurance preferred meter 1 each 0    doxycycline (MONODOX) 50 MG Cap Take 1 capsule (50 mg total) by mouth once daily. 30 capsule 3    KRILL OIL ORAL Take 1 capsule by mouth once daily.      lancets (ONETOUCH DELICA PLUS LANCET) 33 gauge Misc USE TO CHECK BLOOD GLUCOSE FOUR TIMES DAILY 400 each 3    lisinopriL (PRINIVIL,ZESTRIL) 40 MG tablet Take 1 tablet (40 mg total) by mouth once daily. 90 tablet 3    metFORMIN (GLUCOPHAGE) 500 MG tablet Take 2 tablets (1,000 mg total) by mouth 2 (two) times daily. 360 tablet 3    multivitamin (THERAGRAN) per tablet Take 1 tablet by mouth once daily.      omeprazole  (PRILOSEC) 20 MG capsule Take 1 capsule (20 mg total) by mouth once daily. 90 capsule 3    ONETOUCH VERIO SYNC kit USE TO TEST BLOOD GLUOCSE TWICE DAILY 1 each 0    ONETOUCH VERIO TEST STRIPS Strp USE AS DIRECTED TO TEST BLOOD SUGAR THREE TO FOUR TIMES DAILY 200 strip 11    pravastatin (PRAVACHOL) 40 MG tablet Take 1 tablet (40 mg total) by mouth once daily. 90 tablet 3    SITagliptin phosphate (JANUVIA) 25 MG Tab Take 1 tablet (25 mg total) by mouth once daily. 90 tablet 3    vitamin D (VITAMIN D3) 1000 units Tab Take 1,000 Units by mouth once daily.      [DISCONTINUED] hydroCHLOROthiazide (HYDRODIURIL) 25 MG tablet Take 1 tablet (25 mg total) by mouth once daily. 90 tablet 3     No current facility-administered medications on file prior to visit.       Past Medical History:   Diagnosis Date    Anxiety     Bilateral dry eyes     uses atears//    Cataract     Diabetes mellitus     LBSL 128 today---fluctuates    Diverticulosis     GERD (gastroesophageal reflux disease)     Hyperlipidemia     Hypertension     Irritable bowel syndrome     Nephrolithiasis     Severe obesity (BMI 35.0-39.9) with comorbidity        Past Surgical History:   Procedure Laterality Date    COLONOSCOPY  05/16/2013    PAM.   Diverticulosis in the sigmoid colon.  Tortuous colon.  Irritable bowel syndrome?.  Internal hemorrhoids.  Redundant colon. repeat in 5-6 years    COLONOSCOPY N/A 10/31/2018    Procedure: COLONOSCOPY;  Surgeon: Jr Santos Jr., MD;  Location: [facility] ENDO;  Service: Endoscopy;  Laterality: N/A;    COLONOSCOPY N/A 01/18/2024    Procedure: COLONOSCOPY;  Surgeon: Jr Santos Jr., MD;  Location: [facility] ENDO;  Service: Endoscopy;  Laterality: N/A;    COLONOSCOPY W/ POLYPECTOMY  5/13/2009  Rohan SPENCER.   One 4 mm polyp in the transverse colon.  HYPERPLASTIC POLYP.    Diverticulosis sigmoid colon.    Tortuous colon.   Granularity hepatic flexure.     CYSTOSCOPY W/ STONE MANIPULATION      ENDOMETRIAL BIOPSY  X2     ESOPHAGOGASTRODUODENOSCOPY N/A 2023    Procedure: EGD (ESOPHAGOGASTRODUODENOSCOPY);  Surgeon: Jr Santos Jr., MD;  Location: [facility];  Service: Endoscopy;  Laterality: N/A;    HYSTERECTOMY  2013    OOPHORECTOMY      UPPER GASTROINTESTINAL ENDOSCOPY  2017    Dr. Santos        Family History   Problem Relation Name Age of Onset    Cataracts Mother Marie dedebant     Heart disease Mother Marie dedebant     Hypertension Mother Marie dedebant     Diabetes Father Evan dedebant     Hypertension Father Evan dedebant     No Known Problems Sister      No Known Problems Daughter      No Known Problems Maternal Aunt      No Known Problems Maternal Uncle      No Known Problems Paternal Aunt      No Known Problems Paternal Uncle      No Known Problems Maternal Grandmother      No Known Problems Maternal Grandfather      No Known Problems Paternal Grandmother      No Known Problems Paternal Grandfather      Diabetes Brother Evan dedebant 111     Hypertension Brother Evan dedebant 111     No Known Problems Other      Macular degeneration Neg Hx      Retinal detachment Neg Hx      Strabismus Neg Hx      Stroke Neg Hx      Thyroid disease Neg Hx      Glaucoma Neg Hx      Cancer Neg Hx      Amblyopia Neg Hx      Blindness Neg Hx      Breast cancer Neg Hx      Colon cancer Neg Hx      Colon polyps Neg Hx      Crohn's disease Neg Hx      Ulcerative colitis Neg Hx      Stomach cancer Neg Hx      Esophageal cancer Neg Hx      Celiac disease Neg Hx      Ovarian cancer Neg Hx      BRCA 1/2 Neg Hx         Social History     Socioeconomic History    Marital status:    Tobacco Use    Smoking status: Former     Current packs/day: 0.00     Average packs/day: 0.5 packs/day for 23.6 years (11.8 ttl pk-yrs)     Types: Cigarettes     Start date:      Quit date: 2013     Years since quittin.2    Smokeless tobacco: Never   Substance and Sexual Activity    Alcohol use: No    Drug use: No    Sexual  activity: Not Currently     Partners: Male     Birth control/protection: Post-menopausal   Social History Narrative    Lives with . One dog. Enjoys knitting, walking, coloring, playing cards, going to the [employer].      Social Drivers of Health     Financial Resource Strain: Low Risk  (7/3/2024)    Overall Financial Resource Strain (CARDIA)     Difficulty of Paying Living Expenses: Not hard at all   Food Insecurity: No Food Insecurity (7/3/2024)    Hunger Vital Sign     Worried About Running Out of Food in the Last Year: Never true     Ran Out of Food in the Last Year: Never true   Physical Activity: Insufficiently Active (7/3/2024)    Exercise Vital Sign     Days of Exercise per Week: 2 days     Minutes of Exercise per Session: 30 min   Housing Stability: Unknown (7/3/2024)    Housing Stability Vital Sign     Unable to Pay for Housing in the Last Year: No       Review of patient's allergies indicates:   Allergen Reactions    Demerol [meperidine] Hives    Iodinated contrast media Hives    Bactrim [sulfamethoxazole-trimethoprim] Other (See Comments)     Other reaction(s): blotchy skin    Ciprofloxacin Other (See Comments)     Other reaction(s): blotchy skin    Erythromycin Other (See Comments)     tachycardia        Health Maintenance Due   Topic Date Due    Pneumococcal Vaccines (Age 65+) (1 of 2 - PCV) Never done    TETANUS VACCINE  Never done    DEXA Scan  Never done    Shingles Vaccine (1 of 2) Never done    RSV Vaccine (Age 60+ and Pregnant patients) (1 - Risk 60-74 years 1-dose series) Never done    Foot Exam  04/21/2024    Influenza Vaccine (1) 09/01/2024    COVID-19 Vaccine (2 - 2024-25 season) 09/01/2024    Eye Exam  10/12/2024       Objective   Vitals:    11/05/24 1402   BP: 138/78   Pulse: 80        Physical Exam  Constitutional:       General: She is not in acute distress.     Appearance: Normal appearance. She is well-developed.   HENT:      Head: Normocephalic and atraumatic.      Right Ear:  External ear normal.      Left Ear: External ear normal.   Eyes:      Conjunctiva/sclera: Conjunctivae normal.   Pulmonary:      Effort: Pulmonary effort is normal. No respiratory distress.   Abdominal:      General: Abdomen is flat. There is no distension.   Musculoskeletal:         General: No swelling or deformity.      Right lower leg: No edema.      Left lower leg: No edema.   Skin:     General: Skin is warm and dry.      Coloration: Skin is not jaundiced.   Neurological:      Mental Status: She is alert and oriented to person, place, and time. Mental status is at baseline.   Psychiatric:         Attention and Perception: Attention and perception normal.         Mood and Affect: Mood normal.         Speech: Speech normal.         Behavior: Behavior normal. Behavior is cooperative.         Thought Content: Thought content normal.         Cognition and Memory: Cognition normal.         Judgment: Judgment normal.          Assessment & Plan   1. Generalized anxiety disorder  Assessment & Plan:  Variably controlled. Since our last discussion, the patient had tried taking half a pill of Xanax less frequently than her normal four times per day but she suffered significant anxiety as a result. Discussed tolerance and withdrawal. I think keeping the four times per day frequency is key. Instead, she should reduce one of her doses to half a pill for a week. The next week, she can reduce two of her doses to half a pill, etc. Another option would be to convert her to clonazepam and start a taper.       2. Chronic prescription benzodiazepine use    3. Type 2 diabetes mellitus with hyperglycemia, without long-term current use of insulin  Assessment & Plan:  A1C only improved a few tenths of a percentage point to 7.8%. Goal is 7.0%. Continue metformin and sitagliptan. Add Jardiance. If Jardiance is too expensive for patient, I will send a referral to diabetic education.    Orders:  -     empagliflozin (JARDIANCE) 25 mg tablet;  Take 1 tablet (25 mg total) by mouth once daily.  Dispense: 30 tablet; Refill: 11    4. Primary hypertension  Assessment & Plan:  Blood pressure has not improved that much despite medication changes. She also finds that she has been peeing more frequently with the increased dose of hydrochlorothiazide and this is bothersome. Decrease hydrochlorothiazide back to 12.5mg. She plans to exercise more frequently now that the weather is cooler - this may improve blood pressure.     Orders:  -     hydroCHLOROthiazide (HYDRODIURIL) 12.5 MG Tab; Take 1 tablet (12.5 mg total) by mouth once daily.  Dispense: 90 tablet; Refill: 3    5. Gastroesophageal reflux disease with esophagitis without hemorrhage  Assessment & Plan:  Patient says she had been taking the 40mg omeprazole and only recently filled the 20mg dose I had prescribed. She still would like to wean the medication if possible. We will see how she does on the lower dose.        Visit today included increased complexity associated with the care of the episodic problem medication side effect addressed and managing the longitudinal care of the patient due to the serious and/or complex managed problem(s) hypertension, diabetes, anxiety, benzodiazepine dependence.      Follow up in 3 months. I spent 31 minutes pre-charting, interviewing patient, performing exam, formulating and discussing plan, placing orders, and documenting.     Dorian Godinez MD  11/05/2024

## 2024-11-05 NOTE — ASSESSMENT & PLAN NOTE
Blood pressure has not improved that much despite medication changes. She also finds that she has been peeing more frequently with the increased dose of hydrochlorothiazide and this is bothersome. Decrease hydrochlorothiazide back to 12.5mg. She plans to exercise more frequently now that the weather is cooler - this may improve blood pressure.

## 2024-11-05 NOTE — ASSESSMENT & PLAN NOTE
A1C only improved a few tenths of a percentage point to 7.8%. Goal is 7.0%. Continue metformin and sitagliptan. Add Jardiance. If Jardiance is too expensive for patient, I will send a referral to diabetic education.

## 2024-11-05 NOTE — ASSESSMENT & PLAN NOTE
Variably controlled. Since our last discussion, the patient had tried taking half a pill of Xanax less frequently than her normal four times per day but she suffered significant anxiety as a result. Discussed tolerance and withdrawal. I think keeping the four times per day frequency is key. Instead, she should reduce one of her doses to half a pill for a week. The next week, she can reduce two of her doses to half a pill, etc. Another option would be to convert her to clonazepam and start a taper.

## 2024-11-05 NOTE — ASSESSMENT & PLAN NOTE
Patient says she had been taking the 40mg omeprazole and only recently filled the 20mg dose I had prescribed. She still would like to wean the medication if possible. We will see how she does on the lower dose.

## 2024-11-05 NOTE — PATIENT INSTRUCTIONS
For a Xanax taper, decrease one of your four daily doses to a half a pill with the other three being a full pill. After a week, decrease another dose to a half pill for a daily total of two half pills and two full pills. Continue this pattern after every seven days.         ________________________________________________        YOU ARE SEEING THIS BECAUSE YOU ARE ON CHRONIC BENZODIAZEPINE THERAPY.  Benzodiazepine medications are high-risk and do have abuse potential, so there are certain precautions and regulations in place:  Chronic benzodiazepine use is safest if I know exactly how much you are taking, so I should be the only physician providing your prescription. The only exception I allow is my primary care colleagues filling a prescription in my absence.  You are to take your medication as prescribed or less frequently than prescribed. If you take your medication more frequently than prescribed, this can put you at risk for various hazardous outcomes. Additionally, this will be grounds for me terminating your prescription.  I will not provide early refills for lost or stolen medications.  The state requires that I see you every 4-6 months to assess efficacy and tolerance of the medication. If I have not seen you in that time frame, I cannot fill the medication for you. Please assure after every visit that you have a follow up scheduled.  We may need to randomly test your urine to assure compliance with the medication.   Your body can develop tolerance to benzodiazepines. This means that you will eventually need more and more to get the same effect. Needing more and more will put you at greater risk of side effects, especially if you are also taking other controlled substances such as narcotics or sleep aids.  Your body can also become dependent on benzodiazepines and can go into withdrawal. Benzodiazepines can be excellent medications for short term anxiety and insomnia, but long-term benzodiazepine use can  make your chronic anxiety and your chronic insomnia worse. If we need to taper the medication, discussing a plan with me is important as abrupt benzodiazepine withdrawal can induce seizures.

## 2024-11-06 ENCOUNTER — TELEPHONE (OUTPATIENT)
Dept: OPHTHALMOLOGY | Facility: CLINIC | Age: 64
End: 2024-11-06
Payer: MEDICARE

## 2024-11-06 NOTE — TELEPHONE ENCOUNTER
Appointment for chalazion evaluation scheduled with Dr. Stover 12/16/2024 at 3:30 pm at [facility] location (Dr. Jackson referral).

## 2024-11-06 NOTE — TELEPHONE ENCOUNTER
Clinic left voicemail for patient to schedule chalazion evaluation with Dr. Stover at either [facility] or [facility] locations (patient referred by Dr. Jackson).

## 2024-11-06 NOTE — TELEPHONE ENCOUNTER
"----- Message from DESIREE Jackson, ALLEN sent at 11/6/2024  7:31 AM CST -----  Hi,   Please call to schedule for chalazion eval / excision RUL  Thanks in advance  ----- Message -----  From: Sakshi Walters  Sent: 11/5/2024   3:56 PM CST  To: DESIREE Jackson, OD    Pt sts she would like a referral placed to have the "bump" removed.  ----- Message -----  From: Marco Antonio Newton  Sent: 11/5/2024   3:49 PM CST  To: Renetta Whitaker Staff    Type:  Needs Medical Advice    Who Called: the patient  Symptoms (please be specific):eye lid infection    How long has patient had these symptoms:    Pharmacy name and phone #:    Would the patient rather a call back or a response via [facility]? call back  Best Call Back Number: [phone]   Additional Information: Pt states she would like to speak to staff in regards to eye infection  Pt requests referral or sooner appt  Thanks  "

## 2024-11-12 DIAGNOSIS — E11.65 TYPE 2 DIABETES MELLITUS WITH HYPERGLYCEMIA, WITHOUT LONG-TERM CURRENT USE OF INSULIN: ICD-10-CM

## 2024-11-13 RX ORDER — SITAGLIPTIN 25 MG/1
25 TABLET, FILM COATED ORAL
Qty: 90 TABLET | Refills: 1 | Status: SHIPPED | OUTPATIENT
Start: 2024-11-13

## 2024-11-13 NOTE — TELEPHONE ENCOUNTER
Refill Decision Note   Carmen Boggsmerlene  is requesting a refill authorization.  Brief Assessment and Rationale for Refill:  Approve     Medication Therapy Plan:         Comments:     Note composed:4:32 PM 11/13/2024

## 2024-11-27 DIAGNOSIS — F41.1 GENERALIZED ANXIETY DISORDER: ICD-10-CM

## 2024-11-27 DIAGNOSIS — Z79.899 CHRONIC PRESCRIPTION BENZODIAZEPINE USE: ICD-10-CM

## 2024-11-27 RX ORDER — ALPRAZOLAM 0.5 MG/1
TABLET ORAL
Qty: 120 TABLET | Refills: 0 | Status: SHIPPED | OUTPATIENT
Start: 2024-11-27

## 2024-11-27 NOTE — TELEPHONE ENCOUNTER
No care due was identified.  Health [facility] Embedded Care Due Messages. Reference number: [id number].   11/27/2024 3:20:47 AM CST

## 2024-12-16 ENCOUNTER — PROCEDURE VISIT (OUTPATIENT)
Dept: OPHTHALMOLOGY | Facility: CLINIC | Age: 64
End: 2024-12-16
Payer: MEDICARE

## 2024-12-16 DIAGNOSIS — H02.821 CYST OF RIGHT UPPER EYELID: Primary | ICD-10-CM

## 2024-12-16 DIAGNOSIS — H25.13 NUCLEAR SCLEROSIS OF BOTH EYES: ICD-10-CM

## 2024-12-16 PROCEDURE — 92002 INTRM OPH EXAM NEW PATIENT: CPT | Mod: S$PBB,,, | Performed by: OPHTHALMOLOGY

## 2024-12-17 NOTE — PROGRESS NOTES
Subjective:       Patient ID: Carmen Hernandez is a 64 y.o. female.    Chief Complaint: Concerns About Ocular Health    HPI    Here for chalazion eval.  Here for removal of bump x 2 on Right upper lid.  Eye meds: None  Last edited by Kay Fall on 12/16/2024  3:31 PM.             Assessment:       1. Cyst of right upper eyelid    2. Nuclear sclerosis of both eyes        Plan:       RUL cyst-Pt wants it removed.  Cataracts- Not visually significant.      RTC f or RUL cyst excision.

## 2024-12-24 DIAGNOSIS — E11.65 TYPE 2 DIABETES MELLITUS WITH HYPERGLYCEMIA, WITHOUT LONG-TERM CURRENT USE OF INSULIN: ICD-10-CM

## 2024-12-24 DIAGNOSIS — E78.2 MIXED HYPERLIPIDEMIA: ICD-10-CM

## 2024-12-24 RX ORDER — PRAVASTATIN SODIUM 40 MG/1
40 TABLET ORAL DAILY
Qty: 90 TABLET | Refills: 3 | Status: SHIPPED | OUTPATIENT
Start: 2024-12-24

## 2024-12-24 RX ORDER — PRAVASTATIN SODIUM 40 MG/1
40 TABLET ORAL
Qty: 90 TABLET | Refills: 3 | OUTPATIENT
Start: 2024-12-24

## 2024-12-29 DIAGNOSIS — F41.1 GENERALIZED ANXIETY DISORDER: ICD-10-CM

## 2024-12-29 DIAGNOSIS — Z79.899 CHRONIC PRESCRIPTION BENZODIAZEPINE USE: ICD-10-CM

## 2024-12-29 NOTE — TELEPHONE ENCOUNTER
No care due was identified.  Health [facility] Embedded Care Due Messages. Reference number: [id number].   12/29/2024 7:37:03 AM CST

## 2024-12-30 RX ORDER — ALPRAZOLAM 0.5 MG/1
TABLET ORAL
Qty: 120 TABLET | Refills: 0 | Status: SHIPPED | OUTPATIENT
Start: 2024-12-30

## 2025-01-13 ENCOUNTER — PROCEDURE VISIT (OUTPATIENT)
Dept: OPHTHALMOLOGY | Facility: CLINIC | Age: 65
End: 2025-01-13
Payer: MEDICARE

## 2025-01-13 DIAGNOSIS — H02.821 CYST OF RIGHT UPPER EYELID: Primary | ICD-10-CM

## 2025-01-13 DIAGNOSIS — H25.13 NUCLEAR SCLEROSIS OF BOTH EYES: ICD-10-CM

## 2025-01-13 PROCEDURE — 67840 REMOVE EYELID LESION: CPT | Mod: PBBFAC,PO | Performed by: OPHTHALMOLOGY

## 2025-01-13 PROCEDURE — 11440 EXC FACE-MM B9+MARG 0.5 CM/<: CPT | Mod: S$PBB,,, | Performed by: OPHTHALMOLOGY

## 2025-01-13 PROCEDURE — 99499 UNLISTED E&M SERVICE: CPT | Mod: S$PBB,,, | Performed by: OPHTHALMOLOGY

## 2025-01-13 PROCEDURE — 11440 EXC FACE-MM B9+MARG 0.5 CM/<: CPT | Mod: PBBFAC,PO | Performed by: OPHTHALMOLOGY

## 2025-01-14 NOTE — PROGRESS NOTES
Subjective:       Patient ID: Carmen Hernandez is a 64 y.o. female.    Chief Complaint: Procedure    HPI    DLS: 12/16/24    No eyedrops    Pt here for RUL cyst excision. Pt denies changes in RUL cyst since last   visit.  Pt states occasional blurry VA OD.   Last edited by Maira Hanna MA on 1/13/2025  3:41 PM.             Assessment:       1. Cyst of right upper eyelid    2. Nuclear sclerosis of both eyes        Plan:       RUL cyst-Pt is here to have it removed.  Cataracts- Not visually significant.      RUL cyst excision today.  Start Tobradex brenna bid to suture line.  RTC 1 wk to remove sutures.      Procedure note: 2% xylocaine was injected into RUL. Betadine pre applied to RUL. Scissors and forceps were used to excise cyst. Cautery was used for hemostasis. Incision was closed with 2 interrupted 6-0 Prolene sutures. Tobradex brenna was applied to incision. Pt tolerated procedure well.

## 2025-01-15 DIAGNOSIS — E11.9 TYPE 2 DIABETES MELLITUS WITHOUT COMPLICATION: ICD-10-CM

## 2025-01-22 DIAGNOSIS — E11.65 TYPE 2 DIABETES MELLITUS WITH HYPERGLYCEMIA, WITHOUT LONG-TERM CURRENT USE OF INSULIN: ICD-10-CM

## 2025-01-22 DIAGNOSIS — I10 PRIMARY HYPERTENSION: ICD-10-CM

## 2025-01-22 RX ORDER — SITAGLIPTIN 25 MG/1
25 TABLET, FILM COATED ORAL
Qty: 90 TABLET | Refills: 1 | Status: SHIPPED | OUTPATIENT
Start: 2025-01-22 | End: 2025-01-30 | Stop reason: SDUPTHER

## 2025-01-23 DIAGNOSIS — E11.65 TYPE 2 DIABETES MELLITUS WITH HYPERGLYCEMIA, WITHOUT LONG-TERM CURRENT USE OF INSULIN: ICD-10-CM

## 2025-01-23 RX ORDER — METFORMIN HYDROCHLORIDE 500 MG/1
1000 TABLET ORAL 2 TIMES DAILY
Qty: 360 TABLET | Refills: 1 | Status: SHIPPED | OUTPATIENT
Start: 2025-01-23

## 2025-01-23 RX ORDER — LISINOPRIL 40 MG/1
40 TABLET ORAL
Qty: 90 TABLET | Refills: 2 | Status: SHIPPED | OUTPATIENT
Start: 2025-01-23

## 2025-01-23 NOTE — TELEPHONE ENCOUNTER
Refill Decision Note   Carmen Currycristina  is requesting a refill authorization.    Brief Assessment and Rationale for Refill:   Approve       Medication Therapy Plan:         Comments:     Note composed:6:26 PM 01/22/2025

## 2025-01-23 NOTE — TELEPHONE ENCOUNTER
2 bacteria noted on culture. One of these is susceptible to the keflex that was sent previously. Continue that.  The other bacteria is not susceptible to this and there are no antibiotics that cover both. The only antibiotic that she can take that is on the list would be macrobid. Will add this to her current plan.     Last time she and I had spoken she had planned to see urology and wondering if that occurred. It was to discuss her recurrent infections.    No care due was identified.  [facility] Embedded Care Due Messages. Reference number: [id number].   1/22/2025 6:25:40 PM CST

## 2025-01-24 NOTE — TELEPHONE ENCOUNTER
Refill Decision Note   Carmen Boggsmerlene  is requesting a refill authorization.  Brief Assessment and Rationale for Refill:  Approve     Medication Therapy Plan:         Comments:     Note composed:9:15 PM 01/23/2025

## 2025-01-24 NOTE — TELEPHONE ENCOUNTER
Provider Staff:  Action required for this patient    Requires labs      Please see care gap opportunities below in Care Due Message.    Thanks!  [facility]     Appointments      Date Provider   Last Visit   11/5/2024 Dorian Godinez MD   Next Visit   2/6/2025 Dorian Godinez MD     Refill Decision Note   Carmen Hernandez  is requesting a refill authorization.  Brief Assessment and Rationale for Refill:  Approve     Medication Therapy Plan:         Comments:     Note composed:8:53 PM 01/23/2025

## 2025-01-24 NOTE — TELEPHONE ENCOUNTER
Care Due:                  Date            Visit Type   Department     Provider  --------------------------------------------------------------------------------                                EP -                              [facility] FAMILY  Last Visit: 11-      CARE ([facility])   MEDICINE       Dorian Godinez                              EP -                              PRIMARY      [facility] FAMILY  Next Visit: 02-      CARE ([facility])   MEDICINE       Dorian Godinez                                                            Last  Test          Frequency    Reason                     Performed    Due Date  --------------------------------------------------------------------------------    HBA1C.......  6 months...  rafa MCINTYRE,    10-   04-                             metFORMIN................    [employer] Embedded Care Due Messages. Reference number: [id number].   1/23/2025 8:38:38 PM CST

## 2025-01-24 NOTE — TELEPHONE ENCOUNTER
No care due was identified.  [facility] Embedded Care Due Messages. Reference number: [id number].   1/23/2025 8:59:43 PM CST

## 2025-01-29 DIAGNOSIS — E11.9 TYPE 2 DIABETES MELLITUS WITHOUT COMPLICATION: ICD-10-CM

## 2025-01-30 DIAGNOSIS — F41.1 GENERALIZED ANXIETY DISORDER: ICD-10-CM

## 2025-01-30 DIAGNOSIS — Z79.899 CHRONIC PRESCRIPTION BENZODIAZEPINE USE: ICD-10-CM

## 2025-01-30 DIAGNOSIS — E11.65 TYPE 2 DIABETES MELLITUS WITH HYPERGLYCEMIA, WITHOUT LONG-TERM CURRENT USE OF INSULIN: ICD-10-CM

## 2025-01-30 RX ORDER — ALPRAZOLAM 0.5 MG/1
0.5 TABLET ORAL 4 TIMES DAILY PRN
Qty: 120 TABLET | Refills: 0 | Status: SHIPPED | OUTPATIENT
Start: 2025-01-30

## 2025-01-30 NOTE — TELEPHONE ENCOUNTER
No care due was identified.  Health [facility] Embedded Care Due Messages. Reference number: [id number].   1/30/2025 10:40:23 AM CST

## 2025-01-30 NOTE — TELEPHONE ENCOUNTER
LOV: 7/3/24  Upcoming OV: 2/28/25  LF: 1/22/25; pt asking for an override; left her bottle at daughter's house in [place]; next rf is March

## 2025-02-06 ENCOUNTER — OFFICE VISIT (OUTPATIENT)
Dept: FAMILY MEDICINE | Facility: CLINIC | Age: 65
End: 2025-02-06
Payer: MEDICARE

## 2025-02-06 VITALS
HEART RATE: 96 BPM | HEIGHT: 64 IN | OXYGEN SATURATION: 98 % | WEIGHT: 208.75 LBS | DIASTOLIC BLOOD PRESSURE: 82 MMHG | BODY MASS INDEX: 35.64 KG/M2 | SYSTOLIC BLOOD PRESSURE: 136 MMHG

## 2025-02-06 DIAGNOSIS — F41.1 GENERALIZED ANXIETY DISORDER: Primary | ICD-10-CM

## 2025-02-06 DIAGNOSIS — I10 PRIMARY HYPERTENSION: ICD-10-CM

## 2025-02-06 DIAGNOSIS — F33.42 RECURRENT MAJOR DEPRESSIVE DISORDER, IN FULL REMISSION: ICD-10-CM

## 2025-02-06 DIAGNOSIS — E11.65 TYPE 2 DIABETES MELLITUS WITH HYPERGLYCEMIA, WITHOUT LONG-TERM CURRENT USE OF INSULIN: ICD-10-CM

## 2025-02-06 DIAGNOSIS — E66.01 CLASS 2 SEVERE OBESITY DUE TO EXCESS CALORIES WITH SERIOUS COMORBIDITY AND BODY MASS INDEX (BMI) OF 35.0 TO 35.9 IN ADULT: ICD-10-CM

## 2025-02-06 DIAGNOSIS — Z79.899 CHRONIC PRESCRIPTION BENZODIAZEPINE USE: ICD-10-CM

## 2025-02-06 DIAGNOSIS — E66.812 CLASS 2 SEVERE OBESITY DUE TO EXCESS CALORIES WITH SERIOUS COMORBIDITY AND BODY MASS INDEX (BMI) OF 35.0 TO 35.9 IN ADULT: ICD-10-CM

## 2025-02-06 DIAGNOSIS — R61 NIGHT SWEATS: ICD-10-CM

## 2025-02-06 PROCEDURE — 99999 PR PBB SHADOW E&M-EST. PATIENT-LVL IV: CPT | Mod: PBBFAC,,, | Performed by: STUDENT IN AN ORGANIZED HEALTH CARE EDUCATION/TRAINING PROGRAM

## 2025-02-06 PROCEDURE — 99214 OFFICE O/P EST MOD 30 MIN: CPT | Mod: PBBFAC,PO | Performed by: STUDENT IN AN ORGANIZED HEALTH CARE EDUCATION/TRAINING PROGRAM

## 2025-02-06 PROCEDURE — G2211 COMPLEX E/M VISIT ADD ON: HCPCS | Mod: S$PBB,,, | Performed by: STUDENT IN AN ORGANIZED HEALTH CARE EDUCATION/TRAINING PROGRAM

## 2025-02-06 PROCEDURE — 99214 OFFICE O/P EST MOD 30 MIN: CPT | Mod: S$PBB,,, | Performed by: STUDENT IN AN ORGANIZED HEALTH CARE EDUCATION/TRAINING PROGRAM

## 2025-02-06 RX ORDER — CLONAZEPAM 2 MG/1
2 TABLET ORAL 2 TIMES DAILY
Qty: 60 TABLET | Refills: 5 | Status: SHIPPED | OUTPATIENT
Start: 2025-02-06 | End: 2025-02-28

## 2025-02-06 NOTE — PROGRESS NOTES
Name: Carmen Hernandez  MRN: [id number]  : 1960    Subjective   HPI  Patient presents for follow up of anxiety and diabetes. Last visit, we discussed Xanax taper plan. I had also started Jardiance for her diabetes.     Review of Systems   Respiratory:  Positive for cough (after drinking a lot of water).         Patient Active Problem List   Diagnosis    Type 2 diabetes mellitus with hyperglycemia, without long-term current use of insulin    Mixed hyperlipidemia    Pain of right thumb    Decreased range of motion of thumb    Decreased functional activity tolerance    Decreased  strength of right hand    Laryngopharyngeal reflux (LPR)    Gastroesophageal reflux disease    Class 2 severe obesity due to excess calories with serious comorbidity and body mass index (BMI) of 35.0 to 35.9 in adult    Primary hypertension    Recurrent major depressive disorder, in full remission    History of colon polyps    Generalized anxiety disorder    Chronic prescription benzodiazepine use       Current Outpatient Medications on File Prior to Visit   Medication Sig Dispense Refill    ALPRAZolam (XANAX) 0.5 MG tablet Take 1 tablet (0.5 mg total) by mouth 4 (four) times daily as needed for Anxiety. 120 tablet 0    doxycycline (MONODOX) 50 MG Cap Take 1 capsule (50 mg total) by mouth once daily. 30 capsule 3    hydroCHLOROthiazide (HYDRODIURIL) 12.5 MG Tab Take 1 tablet (12.5 mg total) by mouth once daily. 90 tablet 3    KRILL OIL ORAL Take 1 capsule by mouth once daily.      lisinopriL (PRINIVIL,ZESTRIL) 40 MG tablet TAKE 1 TABLET(40 MG) BY MOUTH EVERY DAY 90 tablet 2    metFORMIN (GLUCOPHAGE) 500 MG tablet Take 2 tablets (1,000 mg total) by mouth 2 (two) times a day. 360 tablet 1    multivitamin (THERAGRAN) per tablet Take 1 tablet by mouth once daily.      omeprazole (PRILOSEC) 20 MG capsule Take 1 capsule (20 mg total) by mouth once daily. 90 capsule 3    pravastatin (PRAVACHOL) 40 MG tablet Take 1 tablet (40 mg total)  by mouth once daily. 90 tablet 3    vitamin D (VITAMIN D3) 1000 units Tab Take 1,000 Units by mouth once daily.      [DISCONTINUED] SITagliptin phosphate (JANUVIA) 25 MG Tab Take 1 tablet (25 mg total) by mouth once daily. 90 tablet 1    blood sugar diagnostic Strp To check BG 4 times daily, to use with insurance preferred meter 200 each 11    blood-glucose meter kit To check BG 4 times daily, to use with insurance preferred meter 1 each 0    lancets (ONETOUCH DELICA PLUS LANCET) 33 gauge Misc USE TO CHECK BLOOD GLUCOSE FOUR TIMES DAILY 400 each 3    ONETOUCH VERIO SYNC kit USE TO TEST BLOOD GLUOCSE TWICE DAILY 1 each 0    ONETOUCH VERIO TEST STRIPS Strp USE AS DIRECTED TO TEST BLOOD SUGAR THREE TO FOUR TIMES DAILY 200 strip 11    [DISCONTINUED] empagliflozin (JARDIANCE) 25 mg tablet Take 1 tablet (25 mg total) by mouth once daily. 30 tablet 11     No current facility-administered medications on file prior to visit.       Past Medical History:   Diagnosis Date    Anxiety     Bilateral dry eyes     uses atears//    Cataract     Diabetes mellitus     LBSL 128 today---fluctuates    Diverticulosis     GERD (gastroesophageal reflux disease)     Hyperlipidemia     Hypertension     Irritable bowel syndrome     Nephrolithiasis     Severe obesity (BMI 35.0-39.9) with comorbidity        Past Surgical History:   Procedure Laterality Date    COLONOSCOPY  05/16/2013    PAM.   Diverticulosis in the sigmoid colon.  Tortuous colon.  Irritable bowel syndrome?.  Internal hemorrhoids.  Redundant colon. repeat in 5-6 years    COLONOSCOPY N/A 10/31/2018    Procedure: COLONOSCOPY;  Surgeon: Jr Santos Jr., MD;  Location: [facility] ENDO;  Service: Endoscopy;  Laterality: N/A;    COLONOSCOPY N/A 01/18/2024    Procedure: COLONOSCOPY;  Surgeon: Jr Santos Jr., MD;  Location: [facility] ENDO;  Service: Endoscopy;  Laterality: N/A;    COLONOSCOPY W/ POLYPECTOMY  5/13/2009  Rohan SPENCER.   One 4 mm polyp in the transverse colon.   HYPERPLASTIC POLYP.    Diverticulosis sigmoid colon.    Tortuous colon.   Granularity hepatic flexure.     CYSTOSCOPY W/ STONE MANIPULATION      ENDOMETRIAL BIOPSY  X2    ESOPHAGOGASTRODUODENOSCOPY N/A 03/14/2023    Procedure: EGD (ESOPHAGOGASTRODUODENOSCOPY);  Surgeon: Jr Santos Jr., MD;  Location: [facility];  Service: Endoscopy;  Laterality: N/A;    HYSTERECTOMY  2013    OOPHORECTOMY  2013    UPPER GASTROINTESTINAL ENDOSCOPY  09/26/2017    Dr. Santos        Family History   Problem Relation Name Age of Onset    Cataracts Mother Marie dedebant     Heart disease Mother Marie dedebant     Hypertension Mother Marie dedebant     Diabetes Father Evan dedebant     Hypertension Father Evan dedebant     No Known Problems Sister      No Known Problems Daughter      No Known Problems Maternal Aunt      No Known Problems Maternal Uncle      No Known Problems Paternal Aunt      No Known Problems Paternal Uncle      No Known Problems Maternal Grandmother      No Known Problems Maternal Grandfather      No Known Problems Paternal Grandmother      No Known Problems Paternal Grandfather      Diabetes Brother Evan dedebant 111     Hypertension Brother Evan dedebant 111     No Known Problems Other      Macular degeneration Neg Hx      Retinal detachment Neg Hx      Strabismus Neg Hx      Stroke Neg Hx      Thyroid disease Neg Hx      Glaucoma Neg Hx      Cancer Neg Hx      Amblyopia Neg Hx      Blindness Neg Hx      Breast cancer Neg Hx      Colon cancer Neg Hx      Colon polyps Neg Hx      Crohn's disease Neg Hx      Ulcerative colitis Neg Hx      Stomach cancer Neg Hx      Esophageal cancer Neg Hx      Celiac disease Neg Hx      Ovarian cancer Neg Hx      BRCA 1/2 Neg Hx         Social History     Socioeconomic History    Marital status:    Tobacco Use    Smoking status: Former     Current packs/day: 0.00     Average packs/day: 0.5 packs/day for 23.6 years (11.8 ttl pk-yrs)     Types: Cigarettes     Start  date:      Quit date: 2013     Years since quittin.4    Smokeless tobacco: Never   Substance and Sexual Activity    Alcohol use: No    Drug use: No    Sexual activity: Not Currently     Partners: Male     Birth control/protection: Post-menopausal   Social History Narrative    Lives with . One dog. Enjoys knitting, walking, coloring, playing cards, going to the [employer].      Social Drivers of Health     Financial Resource Strain: Low Risk  (7/3/2024)    Overall Financial Resource Strain (CARDIA)     Difficulty of Paying Living Expenses: Not hard at all   Food Insecurity: No Food Insecurity (7/3/2024)    Hunger Vital Sign     Worried About Running Out of Food in the Last Year: Never true     Ran Out of Food in the Last Year: Never true   Physical Activity: Insufficiently Active (7/3/2024)    Exercise Vital Sign     Days of Exercise per Week: 2 days     Minutes of Exercise per Session: 30 min   Housing Stability: Unknown (7/3/2024)    Housing Stability Vital Sign     Unable to Pay for Housing in the Last Year: No       Review of patient's allergies indicates:   Allergen Reactions    Demerol [meperidine] Hives    Iodinated contrast media Hives    Bactrim [sulfamethoxazole-trimethoprim] Other (See Comments)     Other reaction(s): blotchy skin    Ciprofloxacin Other (See Comments)     Other reaction(s): blotchy skin    Erythromycin Other (See Comments)     tachycardia        Health Maintenance Due   Topic Date Due    TETANUS VACCINE  Never done    Pneumococcal Vaccines (Age 50+) (1 of 2 - PCV) Never done    DEXA Scan  Never done    Shingles Vaccine (1 of 2) Never done    RSV Vaccine (Age 60+ and Pregnant patients) (1 - Risk 60-74 years 1-dose series) Never done    Foot Exam  2024    Influenza Vaccine (1) 2024    COVID-19 Vaccine (2 - - season) 2024    Diabetic Eye Exam  10/12/2024    Diabetes Urine Screening  2024    Mammogram  2025       Objective   Vitals:     02/06/25 1427   BP: 136/82   Pulse: 96        Physical Exam  Constitutional:       General: She is not in acute distress.     Appearance: Normal appearance. She is well-developed.   HENT:      Head: Normocephalic and atraumatic.      Right Ear: External ear normal.      Left Ear: External ear normal.   Eyes:      Conjunctiva/sclera: Conjunctivae normal.   Pulmonary:      Effort: Pulmonary effort is normal. No respiratory distress.   Abdominal:      General: Abdomen is flat. There is no distension.   Musculoskeletal:         General: No swelling or deformity.      Right lower leg: No edema.      Left lower leg: No edema.   Skin:     General: Skin is warm and dry.      Coloration: Skin is not jaundiced.   Neurological:      Mental Status: She is alert and oriented to person, place, and time. Mental status is at baseline.   Psychiatric:         Attention and Perception: Attention and perception normal.         Mood and Affect: Mood normal.         Speech: Speech normal.         Behavior: Behavior normal. Behavior is cooperative.         Thought Content: Thought content normal.         Cognition and Memory: Cognition normal.         Judgment: Judgment normal.          Assessment & Plan   1. Generalized anxiety disorder  Assessment & Plan:  Currently taking Xanax four times per day. Patient did not try taper plan we discussed at last visit and she doesn't see any reason to change anything since she currently isn't experiencing any side effects of the medication. Discussed risks of chronic Xanax use. We will convert to Klonopin for lower risk of severe withdrawal and for easier taper later.    Orders:  -     clonazePAM (KLONOPIN) 2 MG Tab; Take 1 tablet (2 mg total) by mouth 2 (two) times daily.  Dispense: 60 tablet; Refill: 5    2. Chronic prescription benzodiazepine use  -     clonazePAM (KLONOPIN) 2 MG Tab; Take 1 tablet (2 mg total) by mouth 2 (two) times daily.  Dispense: 60 tablet; Refill: 5    3. Recurrent major  depressive disorder, in full remission  Assessment & Plan:  Mood currently stable. See plan for anxiety.     Orders:  -     clonazePAM (KLONOPIN) 2 MG Tab; Take 1 tablet (2 mg total) by mouth 2 (two) times daily.  Dispense: 60 tablet; Refill: 5    4. Type 2 diabetes mellitus with hyperglycemia, without long-term current use of insulin  Assessment & Plan:  Variably controlled. Patient states that she sometimes will wake up with night sweats and check her sugar - usually in the 150s when this occurs. Workup as listed for night sweats. I'm not convinced the diabetes has anything to do with this. Continue metformin. Increase sitagliptin to 50mg. Patient could not afford Jardiance.    Orders:  -     SITagliptin phosphate (JANUVIA) 50 MG Tab; Take 1 tablet (50 mg total) by mouth once daily.  Dispense: 90 tablet; Refill: 3  -     Hemoglobin A1C; Future; Expected date: 02/06/2025    5. Night sweats  -     CBC Auto Differential; Future; Expected date: 02/06/2025  -     Comprehensive Metabolic Panel; Future; Expected date: 02/06/2025  -     Sedimentation rate; Future; Expected date: 02/06/2025  -     C-REACTIVE PROTEIN; Future; Expected date: 02/06/2025    6. Primary hypertension  Assessment & Plan:  Mildly elevated today. No changes at this time.     Orders:  -     CBC Auto Differential; Future; Expected date: 02/06/2025  -     Comprehensive Metabolic Panel; Future; Expected date: 02/06/2025    7. Class 2 severe obesity due to excess calories with serious comorbidity and body mass index (BMI) of 35.0 to 35.9 in adult  Assessment & Plan:  Patient has lost a small amount of weight since her last visit. Continue to monitor.      Visit today included increased complexity associated with the care of the episodic problem night sweats addressed and managing the longitudinal care of the patient due to the serious and/or complex managed problem(s) anxiety, chronic benzodiazepine use, hypertension, diabetes, obesity.      Follow up in  4 months.     Dorian Godinez MD  02/06/2025

## 2025-02-06 NOTE — ASSESSMENT & PLAN NOTE
Currently taking Xanax four times per day. Patient did not try taper plan we discussed at last visit and she doesn't see any reason to change anything since she currently isn't experiencing any side effects of the medication. Discussed risks of chronic Xanax use. We will convert to Klonopin for lower risk of severe withdrawal and for easier taper later.

## 2025-02-06 NOTE — ASSESSMENT & PLAN NOTE
Variably controlled. Patient states that she sometimes will wake up with night sweats and check her sugar - usually in the 150s when this occurs. Workup as listed for night sweats. I'm not convinced the diabetes has anything to do with this. Continue metformin. Increase sitagliptin to 50mg. Patient could not afford Jardiance.

## 2025-02-10 ENCOUNTER — OFFICE VISIT (OUTPATIENT)
Dept: OPHTHALMOLOGY | Facility: CLINIC | Age: 65
End: 2025-02-10
Payer: MEDICARE

## 2025-02-10 DIAGNOSIS — H25.13 NUCLEAR SCLEROSIS OF BOTH EYES: ICD-10-CM

## 2025-02-10 DIAGNOSIS — Z98.890 POST-OPERATIVE STATE: Primary | ICD-10-CM

## 2025-02-10 PROCEDURE — 99212 OFFICE O/P EST SF 10 MIN: CPT | Mod: PBBFAC,PO | Performed by: OPHTHALMOLOGY

## 2025-02-10 PROCEDURE — 99999 PR PBB SHADOW E&M-EST. PATIENT-LVL II: CPT | Mod: PBBFAC,,, | Performed by: OPHTHALMOLOGY

## 2025-02-10 NOTE — PROGRESS NOTES
Subjective:       Patient ID: Carmen Hernandez is a 64 y.o. female.    Chief Complaint: Post-op Evaluation    HPI    Here for suture removal after cyst excision RUL    Eye meds: Tobradex brenna bid to suture line    64 year old female states lid is doing fine. Denies pain. Washes lids with   lid scrubs   Last edited by Mara Kuhn on 2/10/2025 11:04 AM.             Assessment:       1. Post-operative state    2. Nuclear sclerosis of both eyes        Plan:       S/p RUL cyst excision-Doing well. Pt is here for suture removal.  Cataracts- Not visually significant.      RUL suture removed x 1 with forceps & scissors.    RTC me prn.

## 2025-02-21 DIAGNOSIS — H10.829 ROSACEA BLEPHAROCONJUNCTIVITIS: ICD-10-CM

## 2025-02-21 RX ORDER — DOXYCYCLINE 50 MG/1
50 CAPSULE ORAL
Qty: 30 CAPSULE | Refills: 0 | Status: SHIPPED | OUTPATIENT
Start: 2025-02-21

## 2025-02-22 DIAGNOSIS — Z00.00 ENCOUNTER FOR MEDICARE ANNUAL WELLNESS EXAM: ICD-10-CM

## 2025-02-27 DIAGNOSIS — Z79.899 CHRONIC PRESCRIPTION BENZODIAZEPINE USE: ICD-10-CM

## 2025-02-27 DIAGNOSIS — F41.1 GENERALIZED ANXIETY DISORDER: ICD-10-CM

## 2025-02-27 RX ORDER — ALPRAZOLAM 0.5 MG/1
TABLET ORAL
Qty: 120 TABLET | Refills: 0 | Status: SHIPPED | OUTPATIENT
Start: 2025-02-27 | End: 2025-02-28 | Stop reason: SDUPTHER

## 2025-02-27 NOTE — TELEPHONE ENCOUNTER
No care due was identified.  Health [facility] Embedded Care Due Messages. Reference number: [id number].   2/27/2025 1:25:54 PM CST

## 2025-02-28 ENCOUNTER — TELEPHONE (OUTPATIENT)
Dept: OBSTETRICS AND GYNECOLOGY | Facility: CLINIC | Age: 65
End: 2025-02-28
Payer: MEDICARE

## 2025-02-28 ENCOUNTER — OFFICE VISIT (OUTPATIENT)
Dept: INTERNAL MEDICINE | Facility: CLINIC | Age: 65
End: 2025-02-28
Payer: MEDICARE

## 2025-02-28 ENCOUNTER — LAB VISIT (OUTPATIENT)
Dept: LAB | Facility: HOSPITAL | Age: 65
End: 2025-02-28
Attending: INTERNAL MEDICINE
Payer: MEDICARE

## 2025-02-28 VITALS
HEIGHT: 64 IN | HEART RATE: 93 BPM | BODY MASS INDEX: 36.42 KG/M2 | WEIGHT: 213.31 LBS | TEMPERATURE: 97 F | DIASTOLIC BLOOD PRESSURE: 74 MMHG | SYSTOLIC BLOOD PRESSURE: 136 MMHG | OXYGEN SATURATION: 97 %

## 2025-02-28 DIAGNOSIS — J32.9 SINUSITIS, UNSPECIFIED CHRONICITY, UNSPECIFIED LOCATION: ICD-10-CM

## 2025-02-28 DIAGNOSIS — E11.65 TYPE 2 DIABETES MELLITUS WITH HYPERGLYCEMIA, WITHOUT LONG-TERM CURRENT USE OF INSULIN: Primary | Chronic | ICD-10-CM

## 2025-02-28 DIAGNOSIS — Z79.899 CHRONIC PRESCRIPTION BENZODIAZEPINE USE: ICD-10-CM

## 2025-02-28 DIAGNOSIS — E78.5 HYPERLIPIDEMIA ASSOCIATED WITH TYPE 2 DIABETES MELLITUS: Chronic | ICD-10-CM

## 2025-02-28 DIAGNOSIS — E11.65 TYPE 2 DIABETES MELLITUS WITH HYPERGLYCEMIA, WITHOUT LONG-TERM CURRENT USE OF INSULIN: Chronic | ICD-10-CM

## 2025-02-28 DIAGNOSIS — Z12.31 ENCOUNTER FOR SCREENING MAMMOGRAM FOR BREAST CANCER: Primary | ICD-10-CM

## 2025-02-28 DIAGNOSIS — I10 PRIMARY HYPERTENSION: ICD-10-CM

## 2025-02-28 DIAGNOSIS — E11.69 HYPERLIPIDEMIA ASSOCIATED WITH TYPE 2 DIABETES MELLITUS: Chronic | ICD-10-CM

## 2025-02-28 DIAGNOSIS — F41.1 GENERALIZED ANXIETY DISORDER: ICD-10-CM

## 2025-02-28 DIAGNOSIS — F33.42 RECURRENT MAJOR DEPRESSIVE DISORDER, IN FULL REMISSION: Chronic | ICD-10-CM

## 2025-02-28 LAB
ALBUMIN SERPL BCP-MCNC: 3.6 G/DL (ref 3.5–5.2)
ALP SERPL-CCNC: 61 U/L (ref 40–150)
ALT SERPL W/O P-5'-P-CCNC: 40 U/L (ref 10–44)
ANION GAP SERPL CALC-SCNC: 12 MMOL/L (ref 8–16)
AST SERPL-CCNC: 37 U/L (ref 10–40)
BASOPHILS # BLD AUTO: 0.06 K/UL (ref 0–0.2)
BASOPHILS NFR BLD: 0.6 % (ref 0–1.9)
BILIRUB SERPL-MCNC: 0.3 MG/DL (ref 0.1–1)
BUN SERPL-MCNC: 16 MG/DL (ref 8–23)
CALCIUM SERPL-MCNC: 10 MG/DL (ref 8.7–10.5)
CHLORIDE SERPL-SCNC: 100 MMOL/L (ref 95–110)
CHOLEST SERPL-MCNC: 231 MG/DL (ref 120–199)
CHOLEST/HDLC SERPL: 4.4 {RATIO} (ref 2–5)
CO2 SERPL-SCNC: 21 MMOL/L (ref 23–29)
CREAT SERPL-MCNC: 0.7 MG/DL (ref 0.5–1.4)
DIFFERENTIAL METHOD BLD: ABNORMAL
EOSINOPHIL # BLD AUTO: 0.2 K/UL (ref 0–0.5)
EOSINOPHIL NFR BLD: 2 % (ref 0–8)
ERYTHROCYTE [DISTWIDTH] IN BLOOD BY AUTOMATED COUNT: 12.6 % (ref 11.5–14.5)
EST. GFR  (NO RACE VARIABLE): >60 ML/MIN/1.73 M^2
ESTIMATED AVG GLUCOSE: 192 MG/DL (ref 68–131)
GLUCOSE SERPL-MCNC: 211 MG/DL (ref 70–110)
HBA1C MFR BLD: 8.3 % (ref 4–5.6)
HCT VFR BLD AUTO: 39.5 % (ref 37–48.5)
HDLC SERPL-MCNC: 52 MG/DL (ref 40–75)
HDLC SERPL: 22.5 % (ref 20–50)
HGB BLD-MCNC: 12.9 G/DL (ref 12–16)
IMM GRANULOCYTES # BLD AUTO: 0.04 K/UL (ref 0–0.04)
IMM GRANULOCYTES NFR BLD AUTO: 0.4 % (ref 0–0.5)
LDLC SERPL CALC-MCNC: 137.2 MG/DL (ref 63–159)
LYMPHOCYTES # BLD AUTO: 3 K/UL (ref 1–4.8)
LYMPHOCYTES NFR BLD: 31.5 % (ref 18–48)
MCH RBC QN AUTO: 31.3 PG (ref 27–31)
MCHC RBC AUTO-ENTMCNC: 32.7 G/DL (ref 32–36)
MCV RBC AUTO: 96 FL (ref 82–98)
MONOCYTES # BLD AUTO: 0.6 K/UL (ref 0.3–1)
MONOCYTES NFR BLD: 5.9 % (ref 4–15)
NEUTROPHILS # BLD AUTO: 5.7 K/UL (ref 1.8–7.7)
NEUTROPHILS NFR BLD: 59.6 % (ref 38–73)
NONHDLC SERPL-MCNC: 179 MG/DL
NRBC BLD-RTO: 0 /100 WBC
PLATELET # BLD AUTO: 331 K/UL (ref 150–450)
PMV BLD AUTO: 10.7 FL (ref 9.2–12.9)
POTASSIUM SERPL-SCNC: 4.2 MMOL/L (ref 3.5–5.1)
PROT SERPL-MCNC: 6.8 G/DL (ref 6–8.4)
RBC # BLD AUTO: 4.12 M/UL (ref 4–5.4)
SODIUM SERPL-SCNC: 133 MMOL/L (ref 136–145)
TRIGL SERPL-MCNC: 209 MG/DL (ref 30–150)
TSH SERPL DL<=0.005 MIU/L-ACNC: 1.93 UIU/ML (ref 0.4–4)
WBC # BLD AUTO: 9.49 K/UL (ref 3.9–12.7)

## 2025-02-28 PROCEDURE — 84443 ASSAY THYROID STIM HORMONE: CPT | Performed by: INTERNAL MEDICINE

## 2025-02-28 PROCEDURE — 80061 LIPID PANEL: CPT | Performed by: INTERNAL MEDICINE

## 2025-02-28 PROCEDURE — 85025 COMPLETE CBC W/AUTO DIFF WBC: CPT | Performed by: INTERNAL MEDICINE

## 2025-02-28 PROCEDURE — 99214 OFFICE O/P EST MOD 30 MIN: CPT | Mod: PBBFAC,PO | Performed by: INTERNAL MEDICINE

## 2025-02-28 PROCEDURE — 80053 COMPREHEN METABOLIC PANEL: CPT | Performed by: INTERNAL MEDICINE

## 2025-02-28 PROCEDURE — 99999 PR PBB SHADOW E&M-EST. PATIENT-LVL IV: CPT | Mod: PBBFAC,,, | Performed by: INTERNAL MEDICINE

## 2025-02-28 PROCEDURE — 36415 COLL VENOUS BLD VENIPUNCTURE: CPT | Mod: PO | Performed by: INTERNAL MEDICINE

## 2025-02-28 PROCEDURE — 83036 HEMOGLOBIN GLYCOSYLATED A1C: CPT | Performed by: INTERNAL MEDICINE

## 2025-02-28 RX ORDER — FLUTICASONE PROPIONATE 50 MCG
1 SPRAY, SUSPENSION (ML) NASAL DAILY
Qty: 16 G | Refills: 0 | Status: SHIPPED | OUTPATIENT
Start: 2025-02-28

## 2025-02-28 RX ORDER — PRAVASTATIN SODIUM 40 MG/1
40 TABLET ORAL DAILY
Qty: 90 TABLET | Refills: 3 | Status: SHIPPED | OUTPATIENT
Start: 2025-02-28

## 2025-02-28 RX ORDER — METFORMIN HYDROCHLORIDE 500 MG/1
1000 TABLET ORAL 2 TIMES DAILY
Qty: 360 TABLET | Refills: 1 | Status: SHIPPED | OUTPATIENT
Start: 2025-02-28

## 2025-02-28 RX ORDER — ALPRAZOLAM 0.5 MG/1
0.5 TABLET ORAL 4 TIMES DAILY PRN
Qty: 120 TABLET | Refills: 0 | Status: SHIPPED | OUTPATIENT
Start: 2025-02-28

## 2025-02-28 RX ORDER — HYDROCHLOROTHIAZIDE 12.5 MG/1
12.5 TABLET ORAL DAILY
Qty: 90 TABLET | Refills: 3 | Status: SHIPPED | OUTPATIENT
Start: 2025-02-28

## 2025-02-28 RX ORDER — LISINOPRIL 40 MG/1
40 TABLET ORAL DAILY
Qty: 90 TABLET | Refills: 3 | Status: SHIPPED | OUTPATIENT
Start: 2025-02-28

## 2025-02-28 RX ORDER — LEVOCETIRIZINE DIHYDROCHLORIDE 5 MG/1
5 TABLET, FILM COATED ORAL NIGHTLY
Qty: 90 TABLET | OUTPATIENT
Start: 2025-02-28

## 2025-02-28 RX ORDER — LEVOCETIRIZINE DIHYDROCHLORIDE 5 MG/1
5 TABLET, FILM COATED ORAL NIGHTLY
Qty: 30 TABLET | Refills: 0 | Status: SHIPPED | OUTPATIENT
Start: 2025-02-28 | End: 2026-02-28

## 2025-02-28 NOTE — TELEPHONE ENCOUNTER
No care due was identified.  [facility] Embedded Care Due Messages. Reference number: [id number].   2/28/2025 2:05:06 PM CST

## 2025-02-28 NOTE — TELEPHONE ENCOUNTER
----- Message from Jeannie sent at 2/28/2025  3:55 PM CST -----  Type:  Needs Medical AdviceWho Called: ptWould the patient rather a call back or a response via [facility]? Please callBest Call Back Number: [phone] Information: pt would like to receive an order for a mammo    Please call back to advise. Thanks!

## 2025-02-28 NOTE — TELEPHONE ENCOUNTER
Refill Decision Note   Carmen Boggsmerlene  is requesting a refill authorization.  Brief Assessment and Rationale for Refill:  Quick Discontinue     Medication Therapy Plan:         Comments:     Note composed:2:28 PM 02/28/2025

## 2025-02-28 NOTE — PROGRESS NOTES
Assessment:       1. Type 2 diabetes mellitus with hyperglycemia, without long-term current use of insulin  - CBC Auto Differential; Future  - Comprehensive Metabolic Panel; Future  - TSH; Future  - Lipid Panel; Future  - Hemoglobin A1C; Future  - metFORMIN (GLUCOPHAGE) 500 MG tablet; Take 2 tablets (1,000 mg total) by mouth 2 (two) times a day.  Dispense: 360 tablet; Refill: 1  - pravastatin (PRAVACHOL) 40 MG tablet; Take 1 tablet (40 mg total) by mouth once daily.  Dispense: 90 tablet; Refill: 3    2. Primary hypertension  - hydroCHLOROthiazide 12.5 MG Tab; Take 1 tablet (12.5 mg total) by mouth once daily.  Dispense: 90 tablet; Refill: 3  - lisinopriL (PRINIVIL,ZESTRIL) 40 MG tablet; Take 1 tablet (40 mg total) by mouth once daily.  Dispense: 90 tablet; Refill: 3    3. Hyperlipidemia associated with type 2 diabetes mellitus  - pravastatin (PRAVACHOL) 40 MG tablet; Take 1 tablet (40 mg total) by mouth once daily.  Dispense: 90 tablet; Refill: 3    4. Recurrent major depressive disorder, in full remission    5. Generalized anxiety disorder  - ALPRAZolam (XANAX) 0.5 MG tablet; Take 1 tablet (0.5 mg total) by mouth 4 (four) times daily as needed for Anxiety.  Dispense: 120 tablet; Refill: 0    6. Chronic prescription benzodiazepine use  - ALPRAZolam (XANAX) 0.5 MG tablet; Take 1 tablet (0.5 mg total) by mouth 4 (four) times daily as needed for Anxiety.  Dispense: 120 tablet; Refill: 0    7. Sinusitis, unspecified chronicity, unspecified location  - levocetirizine (XYZAL) 5 MG tablet; Take 1 tablet (5 mg total) by mouth every evening.  Dispense: 30 tablet; Refill: 0  - fluticasone propionate (FLONASE) 50 mcg/actuation nasal spray; 1 spray (50 mcg total) by Each Nostril route once daily.  Dispense: 16 g; Refill: 0        Plan:       1. Continue Januvia 50 mg  2. Continue lisinopril 40 mg, HCTZ 12.5 mg  3. Continue pravastatin 40 mg   4. Continue to monitor.  5/6. Xanax 0.5 mg 4 times daily as needed.  Patient advised  he needs to be seen at least every 6 months.  Can not use illegal drugs on this medication.  Can not get medication from other physicians.  7.  Xyzal and Flonase prescribed.    Deep Scribe:  IMPRESSION:  1. Reviewed patient's blood sugar control and A1C trends  2. Considered increasing Januvia dose due to suboptimal glycemic control  3. Evaluated alternative diabetes medications (Ozempic, Mounjaro) but found not covered by insurance  4. Assessed need for additional medication (glipizide) if blood sugars remain elevated  5. Noted patient's symptoms may be due to mild viral infection or allergies    TYPE 2 DIABETES MELLITUS WITH OTHER CIRCULATORY COMPLICATIONS:   Increased Januvia from 25mg to 50mg daily and continued Geneve 50mg for diabetes management.   Explained that Januvia dosing is typically based on kidney function, with 100mg for normal function.   Discussed potential initial discomfort as the body adjusts to lower glucose levels.    HYPERTENSION:   Continue Centeprol 40mg/12.5mg for hypertension management.    HYPERLIPIDEMIA:   Continue pravastatin 40mg for hyperlipidemia management.   Ordered lipid labs for monitoring.    ALLERGIES:   Initiated Zyrtec and Flonase nasal spray for allergy symptoms, including congestion and pressure.   Ms. Hernandez reports symptoms affecting eyes, nose, and includes sneezing.   Suspected condition may be allergies or a mild viral infection.    DIABETES:   Continue monitoring blood sugar at home, noting evening levels around 148.   Addressed patient's report of waking up sweating at 3 A.M. with blood sugar of 148.   Ordered A1C check.   Reviewed A1C history: last measurement was 7.8, has been as low as 6.8, and as high as 8.1.   Explained importance of keeping A1C below 7 to reduce risk of complications.    FOLLOW UP:   Scheduled follow up in 3 months to reassess progress.                 This note was generated with the assistance of ambient listening technology. Verbal  consent was obtained by the patient and accompanying visitor(s) for the recording of patient appointment to facilitate this note. I attest to having reviewed and edited the generated note for accuracy, though some syntax or spelling errors may persist. Please contact the author of this note for any clarification.       Subjective:       Patient ID: Carmen Hernandez is a 65 y.o. female.    Chief Complaint: Establish Care    HPI    65 y.o. female here for follow-up chonic medical conditions.    She has diabetes on metformin 1000 mg BID, januvia 50 mg.  Lab Results   Component Value Date    HGBA1C 7.8 (H) 10/25/2024    HGBA1C 8.0 (H) 05/23/2024    HGBA1C 8.1 (H) 12/28/2023     Lab Results   Component Value Date    GLUF 130 (H) 09/21/2007    LDLCALC 106.0 10/25/2024    CREATININE 0.8 10/25/2024     She has HTN on lisinopril 40 mg, HCTZ 12.5 mg.    She has hyperlipidemia on pravastatin 40 mg.    History of Present Illness    CHIEF COMPLAINT:  Ms. Hernandez presents today for annual exam.    CURRENT ILLNESS:  She reports eye watering, nasal symptoms, and sneezing, though symptoms are less severe compared to other household members. A family member was recently diagnosed with bronchitis at Urgent Care and prescribed prednisone.    DIABETES MANAGEMENT:  Her blood sugar readings have improved, particularly at night, with levels around 148 mg/dL in the evening. She experiences night sweats at 3 A.M., even with controlled blood sugar. A1C has fluctuated between 6.8% and 8.1%, with most recent value at 7.8%. She continues Januvia 50 mg but notes needing to increase food intake to feel better on this dose.    CURRENT MEDICATIONS:  She takes hydrocortisone, lisinopril, metformin, pravastatin, and Xanax as needed.      ROS:  Constitutional: +night sweats  ENT: +nasal congestion  Allergic: +frequent sneezing         Review of Systems          Objective:      Physical Exam  Vitals reviewed.   Constitutional:       Appearance:  She is well-developed.   HENT:      Head: Normocephalic and atraumatic.      Mouth/Throat:      Pharynx: No oropharyngeal exudate.   Eyes:      General: No scleral icterus.        Right eye: No discharge.         Left eye: No discharge.      Pupils: Pupils are equal, round, and reactive to light.   Neck:      Thyroid: No thyromegaly.      Trachea: No tracheal deviation.   Cardiovascular:      Rate and Rhythm: Normal rate and regular rhythm.      Heart sounds: Normal heart sounds. No murmur heard.     No friction rub. No gallop.   Pulmonary:      Effort: Pulmonary effort is normal. No respiratory distress.      Breath sounds: Normal breath sounds. No wheezing or rales.   Chest:      Chest wall: No tenderness.   Abdominal:      General: Bowel sounds are normal. There is no distension.      Palpations: Abdomen is soft. There is no mass.      Tenderness: There is no abdominal tenderness. There is no guarding or rebound.   Musculoskeletal:         General: No tenderness. Normal range of motion.      Cervical back: Normal range of motion and neck supple.   Skin:     General: Skin is warm and dry.      Coloration: Skin is not pale.      Findings: No erythema or rash.   Neurological:      Mental Status: She is alert and oriented to person, place, and time.   Psychiatric:         Behavior: Behavior normal.

## 2025-03-03 ENCOUNTER — RESULTS FOLLOW-UP (OUTPATIENT)
Dept: INTERNAL MEDICINE | Facility: CLINIC | Age: 65
End: 2025-03-03

## 2025-03-24 ENCOUNTER — PATIENT MESSAGE (OUTPATIENT)
Dept: INTERNAL MEDICINE | Facility: CLINIC | Age: 65
End: 2025-03-24
Payer: MEDICARE

## 2025-03-24 ENCOUNTER — HOSPITAL ENCOUNTER (OUTPATIENT)
Dept: RADIOLOGY | Facility: HOSPITAL | Age: 65
Discharge: HOME | End: 2025-03-24
Attending: OBSTETRICS & GYNECOLOGY
Payer: MEDICARE

## 2025-03-24 DIAGNOSIS — Z12.31 ENCOUNTER FOR SCREENING MAMMOGRAM FOR BREAST CANCER: ICD-10-CM

## 2025-03-24 PROCEDURE — 77067 SCR MAMMO BI INCL CAD: CPT | Mod: 26,,, | Performed by: RADIOLOGY

## 2025-03-24 PROCEDURE — 77063 BREAST TOMOSYNTHESIS BI: CPT | Mod: TC,PO

## 2025-03-24 PROCEDURE — 77063 BREAST TOMOSYNTHESIS BI: CPT | Mod: 26,,, | Performed by: RADIOLOGY

## 2025-03-26 DIAGNOSIS — H10.829 ROSACEA BLEPHAROCONJUNCTIVITIS: ICD-10-CM

## 2025-03-26 RX ORDER — DOXYCYCLINE 50 MG/1
50 CAPSULE ORAL
Qty: 30 CAPSULE | Refills: 0 | Status: SHIPPED | OUTPATIENT
Start: 2025-03-26

## 2025-05-01 DIAGNOSIS — H10.829 ROSACEA BLEPHAROCONJUNCTIVITIS: ICD-10-CM

## 2025-05-01 DIAGNOSIS — Z78.0 MENOPAUSE: ICD-10-CM

## 2025-05-01 RX ORDER — DOXYCYCLINE 50 MG/1
50 CAPSULE ORAL
Qty: 30 CAPSULE | Refills: 4 | Status: SHIPPED | OUTPATIENT
Start: 2025-05-01

## 2025-05-02 DIAGNOSIS — Z79.899 CHRONIC PRESCRIPTION BENZODIAZEPINE USE: ICD-10-CM

## 2025-05-02 DIAGNOSIS — F41.1 GENERALIZED ANXIETY DISORDER: ICD-10-CM

## 2025-05-02 RX ORDER — ALPRAZOLAM 0.5 MG/1
TABLET ORAL
Qty: 120 TABLET | Refills: 0 | Status: SHIPPED | OUTPATIENT
Start: 2025-05-02

## 2025-05-02 NOTE — TELEPHONE ENCOUNTER
No care due was identified.  [facility] Embedded Care Due Messages. Reference number: [id number].   5/02/2025 1:35:36 AM CDT

## 2025-05-03 DIAGNOSIS — J32.9 SINUSITIS, UNSPECIFIED CHRONICITY, UNSPECIFIED LOCATION: ICD-10-CM

## 2025-05-03 RX ORDER — FLUTICASONE PROPIONATE 50 MCG
SPRAY, SUSPENSION (ML) NASAL
Qty: 32 G | Refills: 3 | Status: SHIPPED | OUTPATIENT
Start: 2025-05-03

## 2025-05-03 NOTE — TELEPHONE ENCOUNTER
Refill Routing Note   Medication(s) are not appropriate for processing by [facility] for the following reason(s):        New or recently adjusted medication    [facility] action(s):  Defer               Appointments  past 12m or future 3m with PCP    Date Provider   Last Visit   2/28/2025 Reji Santoyo MD   Next Visit   8/29/2025 Reji Santoyo MD   ED visits in past 90 days: 0        Note composed:9:01 AM 05/03/2025

## 2025-05-03 NOTE — TELEPHONE ENCOUNTER
No care due was identified.  [facility] Embedded Care Due Messages. Reference number: [id number].   5/03/2025 3:37:35 AM CDT

## 2025-06-01 DIAGNOSIS — F41.1 GENERALIZED ANXIETY DISORDER: ICD-10-CM

## 2025-06-01 DIAGNOSIS — Z79.899 CHRONIC PRESCRIPTION BENZODIAZEPINE USE: ICD-10-CM

## 2025-06-02 DIAGNOSIS — F41.1 GENERALIZED ANXIETY DISORDER: ICD-10-CM

## 2025-06-02 DIAGNOSIS — Z79.899 CHRONIC PRESCRIPTION BENZODIAZEPINE USE: ICD-10-CM

## 2025-06-02 RX ORDER — ALPRAZOLAM 0.5 MG/1
TABLET ORAL
Qty: 120 TABLET | Refills: 0 | OUTPATIENT
Start: 2025-06-02

## 2025-06-02 RX ORDER — ALPRAZOLAM 0.5 MG/1
TABLET ORAL
Qty: 120 TABLET | Refills: 0 | Status: SHIPPED | OUTPATIENT
Start: 2025-06-02

## 2025-06-03 DIAGNOSIS — E11.69 TYPE 2 DIABETES MELLITUS WITH OTHER SPECIFIED COMPLICATION, WITHOUT LONG-TERM CURRENT USE OF INSULIN: ICD-10-CM

## 2025-06-03 RX ORDER — BLOOD-GLUCOSE METER
EACH MISCELLANEOUS
Qty: 200 STRIP | Refills: 11 | Status: SHIPPED | OUTPATIENT
Start: 2025-06-03

## 2025-06-06 ENCOUNTER — HOSPITAL ENCOUNTER (OUTPATIENT)
Dept: RADIOLOGY | Facility: HOSPITAL | Age: 65
Discharge: HOME | End: 2025-06-06
Attending: STUDENT IN AN ORGANIZED HEALTH CARE EDUCATION/TRAINING PROGRAM
Payer: MEDICARE

## 2025-06-06 ENCOUNTER — OFFICE VISIT (OUTPATIENT)
Dept: FAMILY MEDICINE | Facility: CLINIC | Age: 65
End: 2025-06-06
Payer: MEDICARE

## 2025-06-06 VITALS
DIASTOLIC BLOOD PRESSURE: 62 MMHG | HEIGHT: 64 IN | OXYGEN SATURATION: 97 % | SYSTOLIC BLOOD PRESSURE: 122 MMHG | HEART RATE: 98 BPM | BODY MASS INDEX: 36.25 KG/M2 | WEIGHT: 212.31 LBS

## 2025-06-06 DIAGNOSIS — E11.65 TYPE 2 DIABETES MELLITUS WITH HYPERGLYCEMIA, WITHOUT LONG-TERM CURRENT USE OF INSULIN: Primary | Chronic | ICD-10-CM

## 2025-06-06 DIAGNOSIS — R61 NIGHT SWEATS: ICD-10-CM

## 2025-06-06 DIAGNOSIS — I10 PRIMARY HYPERTENSION: Chronic | ICD-10-CM

## 2025-06-06 DIAGNOSIS — Z79.899 CHRONIC PRESCRIPTION BENZODIAZEPINE USE: ICD-10-CM

## 2025-06-06 DIAGNOSIS — F41.1 GENERALIZED ANXIETY DISORDER: ICD-10-CM

## 2025-06-06 PROCEDURE — 71046 X-RAY EXAM CHEST 2 VIEWS: CPT | Mod: 26,,, | Performed by: RADIOLOGY

## 2025-06-06 PROCEDURE — 99215 OFFICE O/P EST HI 40 MIN: CPT | Mod: PBBFAC,25,PO | Performed by: STUDENT IN AN ORGANIZED HEALTH CARE EDUCATION/TRAINING PROGRAM

## 2025-06-06 PROCEDURE — 99999 PR PBB SHADOW E&M-EST. PATIENT-LVL V: CPT | Mod: PBBFAC,,, | Performed by: STUDENT IN AN ORGANIZED HEALTH CARE EDUCATION/TRAINING PROGRAM

## 2025-06-06 PROCEDURE — 71046 X-RAY EXAM CHEST 2 VIEWS: CPT | Mod: TC,FY,PO

## 2025-06-09 ENCOUNTER — RESULTS FOLLOW-UP (OUTPATIENT)
Dept: FAMILY MEDICINE | Facility: CLINIC | Age: 65
End: 2025-06-09
Payer: MEDICARE

## 2025-06-09 DIAGNOSIS — E11.65 TYPE 2 DIABETES MELLITUS WITH HYPERGLYCEMIA, WITHOUT LONG-TERM CURRENT USE OF INSULIN: ICD-10-CM

## 2025-07-02 DIAGNOSIS — F41.1 GENERALIZED ANXIETY DISORDER: ICD-10-CM

## 2025-07-02 DIAGNOSIS — Z79.899 CHRONIC PRESCRIPTION BENZODIAZEPINE USE: ICD-10-CM

## 2025-07-02 RX ORDER — ALPRAZOLAM 0.5 MG/1
TABLET ORAL
Qty: 120 TABLET | Refills: 0 | Status: SHIPPED | OUTPATIENT
Start: 2025-07-02

## 2025-07-02 NOTE — TELEPHONE ENCOUNTER
No care due was identified.  [facility] Embedded Care Due Messages. Reference number: [id number].   7/02/2025 11:59:04 AM CDT

## 2025-07-28 ENCOUNTER — OFFICE VISIT (OUTPATIENT)
Dept: OPTOMETRY | Facility: CLINIC | Age: 65
End: 2025-07-28
Payer: MEDICARE

## 2025-07-28 DIAGNOSIS — H52.4 MYOPIA WITH ASTIGMATISM AND PRESBYOPIA, BILATERAL: ICD-10-CM

## 2025-07-28 DIAGNOSIS — Z13.5 GLAUCOMA SCREENING: ICD-10-CM

## 2025-07-28 DIAGNOSIS — H43.813 POSTERIOR VITREOUS DETACHMENT, BILATERAL: ICD-10-CM

## 2025-07-28 DIAGNOSIS — H04.123 DRY EYE SYNDROME OF BILATERAL LACRIMAL GLANDS: ICD-10-CM

## 2025-07-28 DIAGNOSIS — H52.13 MYOPIA WITH ASTIGMATISM AND PRESBYOPIA, BILATERAL: ICD-10-CM

## 2025-07-28 DIAGNOSIS — H52.203 MYOPIA WITH ASTIGMATISM AND PRESBYOPIA, BILATERAL: ICD-10-CM

## 2025-07-28 DIAGNOSIS — H25.813 COMBINED FORMS OF AGE-RELATED CATARACT, BILATERAL: ICD-10-CM

## 2025-07-28 DIAGNOSIS — H10.829 ROSACEA BLEPHAROCONJUNCTIVITIS: ICD-10-CM

## 2025-07-28 DIAGNOSIS — E11.9 TYPE 2 DIABETES MELLITUS WITHOUT RETINOPATHY: Primary | ICD-10-CM

## 2025-07-28 PROCEDURE — 92014 COMPRE OPH EXAM EST PT 1/>: CPT | Mod: S$PBB,,, | Performed by: OPTOMETRIST

## 2025-07-28 PROCEDURE — 99999 PR PBB SHADOW E&M-EST. PATIENT-LVL III: CPT | Mod: PBBFAC,,, | Performed by: OPTOMETRIST

## 2025-07-28 PROCEDURE — 99213 OFFICE O/P EST LOW 20 MIN: CPT | Mod: PBBFAC,PO | Performed by: OPTOMETRIST

## 2025-07-28 PROCEDURE — 92015 DETERMINE REFRACTIVE STATE: CPT | Mod: ,,, | Performed by: OPTOMETRIST

## 2025-07-28 NOTE — PROGRESS NOTES
HPI    Routine dm-dle-2/25 Jolanta    Pt complains of blurred va. Needing updated glasses rx. Denies any flashes   or floaters. BSL fluctuates.     Hemoglobin A1C       Date                     Value               Ref Range             Status                02/28/2025               8.3 (H)             4.0 - 5.6 %           Final              Comment:    ADA Screening Guidelines:  5.7-6.4%  Consistent with   prediabetes  >or=6.5%  Consistent with diabetes    High levels of fetal   hemoglobin interfere with the HbA1C  assay. Heterozygous hemoglobin   variants (HbS, HgC, etc)do  not significantly interfere with this assay.     However, presence of multiple variants may affect accuracy.         10/25/2024               7.8 (H)             4.0 - 5.6 %           Final              Comment:    ADA Screening Guidelines:  5.7-6.4%  Consistent with   prediabetes  >or=6.5%  Consistent with diabetes    High levels of fetal   hemoglobin interfere with the HbA1C  assay. Heterozygous hemoglobin   variants (HbS, HgC, etc)do  not significantly interfere with this assay.     However, presence of multiple variants may affect accuracy.         05/23/2024               8.0 (H)             4.0 - 5.6 %           Final              Comment:    ADA Screening Guidelines:  5.7-6.4%  Consistent with   prediabetes  >or=6.5%  Consistent with diabetes    High levels of fetal   hemoglobin interfere with the HbA1C  assay. Heterozygous hemoglobin   variants (HbS, HgC, etc)do  not significantly interfere with this assay.     However, presence of multiple variants may affect accuracy.    ----------  Hemoglobin A1c       Date                     Value               Ref Range             Status                06/06/2025               8.3 (H)             4.0 - 5.6 %           Final              Comment:    ADA Screening Guidelines:  5.7-6.4%  Consistent with prediabetes  >=6.5%    Consistent with diabetes    High levels of fetal hemoglobin interfere    with the HbA1C  assay. Heterozygous hemoglobin variants (HbS, HgC,   etc)do  not significantly interfere with this assay.   However, presence   of multiple variants may affect accuracy.  ----------    Last edited by Lauren Conroy on 7/28/2025  3:51 PM.            Assessment /Plan     For exam results, see Encounter Report.    Type 2 diabetes mellitus without retinopathy    Combined forms of age-related cataract, bilateral    Posterior vitreous detachment, bilateral    Glaucoma screening    Rosacea blepharoconjunctivitis    Dry eye syndrome of bilateral lacrimal glands    Myopia with astigmatism and presbyopia, bilateral      No cornelius/ no csme, gave Diabetic Retinopathy info, advise tight control glucose, BP---Advise annual dilated fundus exam  Vis sig NS cataracts, OU. Not ready for consult, gave info, cautions driving especially at night. CE possible in 2-4 yrs   RD precautions given and reviewed. Patient knows to call/ message if any further changes in symptoms occur.  Not suspect   5,6. No current inflam episodes --continue ATs and lid hygiene, scrubs and warm compress  7.   Updated specs rx gave copy, discussed options // fill prn --ok continue with current     Had chalazion/ cyst removed FLOR william/ Jolanta --good result     Discussed and educated patient on current findings /plan.  RTC 1 year, prn if any changes / issues

## 2025-07-28 NOTE — PATIENT INSTRUCTIONS
"DRY EYES -- BURNING OR NORMAN SYMPTOMS:  Use Over The Counter artificial tears as needed for dry eye symptoms.   Some common brands include:  Systane, Optive, Refresh, and Thera-Tears.  These drops can be used as frequently as desired, but may be most helpful use during long periods of concentrated work.  For example, reading / working at the computer. Start with 3-4x per day.     Nighttime Ophthalmic gel or ointments are available: Refresh PM, Genteal, and Lacrilube.    Avoid drops that "get redness out" (Visine, Murine, Clear Eyes), as these may contain medication that could further irritate the eyes, especially with chronic use.    ALLERGY EYES -- ITCHING SYMPTOMS:  Over the counter medications include--Pataday, Zaditor, and Alaway.  Use as directed 1-2 drops daily for symptoms of itching / watering eyes.  These drops will not help for dry eye or exposure symptoms.    REDNESS RELIEF:  Lumify---is a good redness reliever that will not cause irritation if used chronically.        FLASHES / FLOATERS / POSTERIOR VITREOUS DETACHMENT    Call the clinic if you have any further changes in symptoms.  Including:  Increased numbers of floaters or flashing lights, dimness or darkness that moves through or stays constant in your vision, or any pain in the eye (s).    You may sometimes see small specks or clouds moving in your field of vision.  They are called FLOATERS.  You can often see them when looking at a plain background, like a blank wall or blue cassandra.  Floaters are actually tiny clumps of gel or cells inside the VITREOUS, the clear jelly-like fluid that fills the inside of your eye.    While these objects look like they are in front of your eye, they are actually floating inside.  What you see are the shadows they cast on the RETINA, the nerve layer at the back of the eye that senses light and allows you to see.      POSTERIOR VITREOUS DETACHMENT    The appearance of new floaters may be alarming.  If you suddenly " develop new floaters, you should contact your eye care professional  right away.    The retina can tear if the shrinking vitreous pulls away from the wall of the eye.  This sometimes causes a small amount of bleeding in the eye that may appear as new floaters.    A torn retina is always a serious problem, since it can lead to a retinal detachment.  You should see your eye care professional as soon as possible if:    even one new floater appears suddenly;  you see sudden flashes of light;  you notice other symptoms, like the loss of side vision, or a curtain closes down in your vision        POSTERIOR VITREOUS DETACHMENT is more common for people who:    are nearsighted;  have had cataract surgery;  have had YAG laser surgery of the eye;  have had inflammation inside the eye;  are over age 60.      While some floaters may remain visible, many of them will fade over time and become less noticeable.  Even if you've had some floaters for years, you should have your eyes checked as soon as possible if you notice new ones.    FLASHING LIGHTS    When the vitreous gel rubs or pulls on the retina, you may see what look like flashing lights or lightning streaks.  These flashes can appear off and on for several weeks or months.      Some people experience flashes of light that appear as jagged lines or heat waves in both eyes, lasting 10-20 minutes.  These flashes are caused by a spasm of blood vessels in the brain, which is called a migraine.    If a headache follows these flashes, it's called a migraine headache.  If   no headache occurs, these flashes are called Ophthalmic or Ocular Migraine.           Early Cataracts--not visually significant for surgery consultation.    What Are Cataracts?  A clear lens in the eye focuses light. This lets the eye see images sharply. With age, the lens slowly becomes cloudy. The cloudy lens is a cataract. A cataract scatters light and makes it hard for the eye to focus. Cataracts often  form in both eyes. But one lens may cloud faster than the other.      The Aging of Your Lens    Your lens may cloud so slowly that you don`t notice any vision changes at first. But as the cataract gets worse, the eye has a harder time focusing. In early stages, glasses may help you see better. As the lens gets cloudier, your doctor may recommend surgery to restore your vision.

## 2025-08-04 DIAGNOSIS — F41.1 GENERALIZED ANXIETY DISORDER: ICD-10-CM

## 2025-08-04 DIAGNOSIS — Z79.899 CHRONIC PRESCRIPTION BENZODIAZEPINE USE: ICD-10-CM

## 2025-08-04 RX ORDER — ALPRAZOLAM 0.5 MG/1
0.25 TABLET ORAL 4 TIMES DAILY PRN
Qty: 120 TABLET | Refills: 0 | Status: SHIPPED | OUTPATIENT
Start: 2025-08-04

## 2025-08-04 NOTE — TELEPHONE ENCOUNTER
No care due was identified.  [facility] Embedded Care Due Messages. Reference number: [id number].   8/04/2025 1:54:25 AM CDT

## 2025-08-29 PROBLEM — F41.1 GENERALIZED ANXIETY DISORDER: Chronic | Status: ACTIVE | Noted: 2024-08-01

## 2025-08-29 PROBLEM — K21.9 GASTROESOPHAGEAL REFLUX DISEASE: Chronic | Status: ACTIVE | Noted: 2017-09-26

## 2025-09-02 ENCOUNTER — OFFICE VISIT (OUTPATIENT)
Dept: FAMILY MEDICINE | Facility: CLINIC | Age: 65
End: 2025-09-02
Payer: MEDICARE

## 2025-09-02 VITALS
OXYGEN SATURATION: 96 % | HEART RATE: 88 BPM | BODY MASS INDEX: 36.27 KG/M2 | DIASTOLIC BLOOD PRESSURE: 72 MMHG | WEIGHT: 212.44 LBS | HEIGHT: 64 IN | SYSTOLIC BLOOD PRESSURE: 136 MMHG

## 2025-09-02 DIAGNOSIS — H61.23 BILATERAL IMPACTED CERUMEN: ICD-10-CM

## 2025-09-02 DIAGNOSIS — E11.65 TYPE 2 DIABETES MELLITUS WITH HYPERGLYCEMIA, WITHOUT LONG-TERM CURRENT USE OF INSULIN: Primary | ICD-10-CM

## 2025-09-02 DIAGNOSIS — F41.1 GENERALIZED ANXIETY DISORDER: ICD-10-CM

## 2025-09-02 DIAGNOSIS — Z79.899 CHRONIC PRESCRIPTION BENZODIAZEPINE USE: ICD-10-CM

## 2025-09-02 DIAGNOSIS — I10 PRIMARY HYPERTENSION: ICD-10-CM

## 2025-09-02 DIAGNOSIS — E78.2 MIXED HYPERLIPIDEMIA: ICD-10-CM

## 2025-09-02 PROCEDURE — 99999 PR PBB SHADOW E&M-EST. PATIENT-LVL IV: CPT | Mod: PBBFAC,,,

## 2025-09-02 PROCEDURE — 99214 OFFICE O/P EST MOD 30 MIN: CPT | Mod: PBBFAC,PO

## 2025-09-02 RX ORDER — ALPRAZOLAM 0.5 MG/1
0.25 TABLET ORAL 3 TIMES DAILY PRN
Qty: 45 TABLET | Refills: 0 | Status: SHIPPED | OUTPATIENT
Start: 2025-09-02 | End: 2025-10-02

## 2025-09-02 RX ORDER — ALPRAZOLAM 0.5 MG/1
0.25 TABLET ORAL 4 TIMES DAILY PRN
Qty: 120 TABLET | Refills: 0 | Status: CANCELLED | OUTPATIENT
Start: 2025-09-02

## 2025-09-02 RX ORDER — ALPRAZOLAM 0.5 MG/1
0.25 TABLET ORAL 4 TIMES DAILY PRN
Qty: 120 TABLET | Refills: 0 | OUTPATIENT
Start: 2025-09-02